# Patient Record
Sex: MALE | Race: WHITE | NOT HISPANIC OR LATINO | Employment: OTHER | URBAN - METROPOLITAN AREA
[De-identification: names, ages, dates, MRNs, and addresses within clinical notes are randomized per-mention and may not be internally consistent; named-entity substitution may affect disease eponyms.]

---

## 2021-05-17 ENCOUNTER — IMMUNIZATION (OUTPATIENT)
Dept: VACCINE CLINIC | Facility: HOSPITAL | Age: 71
End: 2021-05-17

## 2021-05-17 PROCEDURE — 91300 HC SARSCOV02 VAC 30MCG/0.3ML IM: CPT | Performed by: INTERNAL MEDICINE

## 2021-05-17 PROCEDURE — 0001A: CPT | Performed by: INTERNAL MEDICINE

## 2021-06-07 ENCOUNTER — APPOINTMENT (OUTPATIENT)
Dept: VACCINE CLINIC | Facility: HOSPITAL | Age: 71
End: 2021-06-07

## 2021-06-09 ENCOUNTER — APPOINTMENT (OUTPATIENT)
Dept: VACCINE CLINIC | Facility: HOSPITAL | Age: 71
End: 2021-06-09

## 2021-12-10 ENCOUNTER — TRANSCRIBE ORDERS (OUTPATIENT)
Dept: ADMINISTRATIVE | Facility: HOSPITAL | Age: 71
End: 2021-12-10

## 2021-12-10 ENCOUNTER — HOSPITAL ENCOUNTER (OUTPATIENT)
Dept: GENERAL RADIOLOGY | Facility: HOSPITAL | Age: 71
Discharge: HOME | End: 2021-12-10

## 2021-12-10 ENCOUNTER — HOSPITAL ENCOUNTER (OUTPATIENT)
Dept: CARDIOLOGY | Facility: HOSPITAL | Age: 71
Discharge: HOME | End: 2021-12-10

## 2021-12-10 DIAGNOSIS — S89.92XA INJURY OF LEFT KNEE, INITIAL ENCOUNTER: ICD-10-CM

## 2021-12-10 DIAGNOSIS — R60.9 EDEMA, UNSPECIFIED TYPE: Primary | ICD-10-CM

## 2021-12-10 DIAGNOSIS — R60.9 EDEMA, UNSPECIFIED TYPE: ICD-10-CM

## 2021-12-10 DIAGNOSIS — I10 ESSENTIAL HYPERTENSION, MALIGNANT: Primary | ICD-10-CM

## 2021-12-10 LAB
BH CV LOWER VASCULAR LEFT COMMON FEMORAL AUGMENT: NORMAL
BH CV LOWER VASCULAR LEFT COMMON FEMORAL COMPETENT: NORMAL
BH CV LOWER VASCULAR LEFT COMMON FEMORAL COMPRESS: NORMAL
BH CV LOWER VASCULAR LEFT COMMON FEMORAL PHASIC: NORMAL
BH CV LOWER VASCULAR LEFT COMMON FEMORAL SPONT: NORMAL
BH CV LOWER VASCULAR LEFT DISTAL FEMORAL COMPRESS: NORMAL
BH CV LOWER VASCULAR LEFT GASTRONEMIUS COMPRESS: NORMAL
BH CV LOWER VASCULAR LEFT GREATER SAPH AK COMPRESS: NORMAL
BH CV LOWER VASCULAR LEFT GREATER SAPH BK COMPRESS: NORMAL
BH CV LOWER VASCULAR LEFT LESSER SAPH COMPRESS: NORMAL
BH CV LOWER VASCULAR LEFT MID FEMORAL AUGMENT: NORMAL
BH CV LOWER VASCULAR LEFT MID FEMORAL COMPETENT: NORMAL
BH CV LOWER VASCULAR LEFT MID FEMORAL COMPRESS: NORMAL
BH CV LOWER VASCULAR LEFT MID FEMORAL PHASIC: NORMAL
BH CV LOWER VASCULAR LEFT MID FEMORAL SPONT: NORMAL
BH CV LOWER VASCULAR LEFT PERONEAL COMPRESS: NORMAL
BH CV LOWER VASCULAR LEFT POPLITEAL AUGMENT: NORMAL
BH CV LOWER VASCULAR LEFT POPLITEAL COMPETENT: NORMAL
BH CV LOWER VASCULAR LEFT POPLITEAL COMPRESS: NORMAL
BH CV LOWER VASCULAR LEFT POPLITEAL PHASIC: NORMAL
BH CV LOWER VASCULAR LEFT POPLITEAL SPONT: NORMAL
BH CV LOWER VASCULAR LEFT POSTERIOR TIBIAL COMPRESS: NORMAL
BH CV LOWER VASCULAR LEFT PROFUNDA FEMORAL COMPRESS: NORMAL
BH CV LOWER VASCULAR LEFT PROXIMAL FEMORAL COMPRESS: NORMAL
BH CV LOWER VASCULAR LEFT SAPHENOFEMORAL JUNCTION COMPRESS: NORMAL
BH CV LOWER VASCULAR RIGHT COMMON FEMORAL AUGMENT: NORMAL
BH CV LOWER VASCULAR RIGHT COMMON FEMORAL COMPETENT: NORMAL
BH CV LOWER VASCULAR RIGHT COMMON FEMORAL COMPRESS: NORMAL
BH CV LOWER VASCULAR RIGHT COMMON FEMORAL PHASIC: NORMAL
BH CV LOWER VASCULAR RIGHT COMMON FEMORAL SPONT: NORMAL

## 2021-12-10 PROCEDURE — 73562 X-RAY EXAM OF KNEE 3: CPT

## 2021-12-10 PROCEDURE — 93971 EXTREMITY STUDY: CPT

## 2022-01-10 ENCOUNTER — TRANSCRIBE ORDERS (OUTPATIENT)
Dept: ADMINISTRATIVE | Facility: HOSPITAL | Age: 72
End: 2022-01-10

## 2022-01-10 DIAGNOSIS — I10 ESSENTIAL (PRIMARY) HYPERTENSION: Primary | ICD-10-CM

## 2022-01-11 ENCOUNTER — APPOINTMENT (OUTPATIENT)
Dept: ULTRASOUND IMAGING | Facility: HOSPITAL | Age: 72
End: 2022-01-11

## 2022-01-31 ENCOUNTER — HOSPITAL ENCOUNTER (OUTPATIENT)
Dept: CARDIOLOGY | Facility: HOSPITAL | Age: 72
Discharge: HOME | End: 2022-01-31
Admitting: INTERNAL MEDICINE

## 2022-01-31 DIAGNOSIS — I10 ESSENTIAL (PRIMARY) HYPERTENSION: ICD-10-CM

## 2022-01-31 LAB
BH CV ECHO MEAS - DIST REN A EDV LEFT: 40.8 CM/S
BH CV ECHO MEAS - DIST REN A PSV LEFT: 141 CM/S
BH CV ECHO MEAS - MID REN A EDV LEFT: 38.9 CM/S
BH CV ECHO MEAS - MID REN A PSV LEFT: 171 CM/S
BH CV ECHO MEAS - PROX REN A EDV LEFT: 35.3 CM/S
BH CV ECHO MEAS - PROX REN A PSV LEFT: 120 CM/S
BH CV VAS BP LEFT ARM: NORMAL MMHG
BH CV VAS BP RIGHT ARM: NORMAL MMHG
BH CV VAS RENAL AORTIC MID PSV: 91.5 CM/S
BH CV VAS RENAL HILUM LEFT EDV: 12.3 CM/S
BH CV VAS RENAL HILUM LEFT PSV: 42.4 CM/S
BH CV VAS RENAL HILUM RIGHT EDV: 20.4 CM/S
BH CV VAS RENAL HILUM RIGHT PSV: 61.4 CM/S
BH CV XLRA MEAS - KID L LEFT: 10.3 CM
BH CV XLRA MEAS DIST REN A EDV RIGHT: 47.9 CM/S
BH CV XLRA MEAS DIST REN A PSV RIGHT: 170 CM/S
BH CV XLRA MEAS KID L RIGHT: 10.8 CM
BH CV XLRA MEAS KID W RIGHT: 4.49 CM
BH CV XLRA MEAS MID REN A EDV RIGHT: 47.5 CM/S
BH CV XLRA MEAS MID REN A PSV RIGHT: 171 CM/S
BH CV XLRA MEAS PROX REN A EDV RIGHT: 49.1 CM/S
BH CV XLRA MEAS PROX REN A PSV RIGHT: 190 CM/S
BH CV XLRA MEAS RENAL A ORG EDV LEFT: 34.3 CM/S
BH CV XLRA MEAS RENAL A ORG EDV RIGHT: 29.2 CM/S
BH CV XLRA MEAS RENAL A ORG PSV LEFT: 154 CM/S
BH CV XLRA MEAS RENAL A ORG PSV RIGHT: 118 CM/S
LEFT ACCESSORY RENAL DIST DIAS: 28.2 CM/S
LEFT ACCESSORY RENAL DIST SYS: 108 CM/S
LEFT ACCESSORY RENAL MID DIAS: 28.2 CM/S
LEFT ACCESSORY RENAL MID SYS: 99.4 CM/S
LEFT ACCESSORY RENAL ORIGIN DIAS: 33 CM/S
LEFT ACCESSORY RENAL ORIGIN SYS: 132 CM/S
LEFT ACCESSORY RENAL PROX DIAS: 28.2 CM/S
LEFT ACCESSORY RENAL PROX SYS: 114 CM/S
LEFT KIDNEY WIDTH: 5.27 CM
LEFT RENAL UPPER PARENCHYMA MAX: 31.4 CM/S
LEFT RENAL UPPER PARENCHYMA MIN: 7.99 CM/S
LEFT RENAL UPPER PARENCHYMA RI: 0.75
MAXIMAL PREDICTED HEART RATE: 150 BPM
RIGHT ACCESSORY RENAL DIST DIAS: 35.6 CM/S
RIGHT ACCESSORY RENAL DIST SYS: 139 CM/S
RIGHT ACCESSORY RENAL MID DIAS: 34.1 CM/S
RIGHT ACCESSORY RENAL MID SYS: 135 CM/S
RIGHT ACCESSORY RENAL ORIGIN DIAS: 40 CM/S
RIGHT ACCESSORY RENAL ORIGIN SYS: 129 CM/S
RIGHT ACCESSORY RENAL PROX DIAS: 31.7 CM/S
RIGHT ACCESSORY RENAL PROX SYS: 168 CM/S
RIGHT RENAL UPPER PARENCHYMA MAX: 48.7 CM/S
RIGHT RENAL UPPER PARENCHYMA MIN: 12.2 CM/S
RIGHT RENAL UPPER PARENCHYMA RI: 0.75
STRESS TARGET HR: 128 BPM

## 2022-01-31 PROCEDURE — 93975 VASCULAR STUDY: CPT

## 2022-02-02 ENCOUNTER — TRANSCRIBE ORDERS (OUTPATIENT)
Dept: DIABETES SERVICES | Facility: HOSPITAL | Age: 72
End: 2022-02-02

## 2022-02-02 DIAGNOSIS — E11.9 NEWLY DIAGNOSED DIABETES: Primary | ICD-10-CM

## 2022-03-01 ENCOUNTER — APPOINTMENT (OUTPATIENT)
Dept: DIABETES SERVICES | Facility: HOSPITAL | Age: 72
End: 2022-03-01

## 2022-03-15 ENCOUNTER — TRANSCRIBE ORDERS (OUTPATIENT)
Dept: ADMINISTRATIVE | Facility: HOSPITAL | Age: 72
End: 2022-03-15

## 2022-03-15 DIAGNOSIS — R60.9 EDEMA, UNSPECIFIED TYPE: Primary | ICD-10-CM

## 2022-03-15 DIAGNOSIS — I10 HYPERTENSION, UNSPECIFIED TYPE: ICD-10-CM

## 2022-03-30 ENCOUNTER — TRANSCRIBE ORDERS (OUTPATIENT)
Dept: ADMINISTRATIVE | Facility: HOSPITAL | Age: 72
End: 2022-03-30

## 2022-03-30 DIAGNOSIS — I70.1 RENAL ARTERY STENOSIS: Primary | ICD-10-CM

## 2022-04-06 ENCOUNTER — TRANSCRIBE ORDERS (OUTPATIENT)
Dept: ADMINISTRATIVE | Facility: HOSPITAL | Age: 72
End: 2022-04-06

## 2022-04-06 DIAGNOSIS — I10 ESSENTIAL HYPERTENSION, MALIGNANT: Primary | ICD-10-CM

## 2022-04-14 ENCOUNTER — APPOINTMENT (OUTPATIENT)
Dept: DIABETES SERVICES | Facility: HOSPITAL | Age: 72
End: 2022-04-14

## 2022-04-21 ENCOUNTER — APPOINTMENT (OUTPATIENT)
Dept: DIABETES SERVICES | Facility: HOSPITAL | Age: 72
End: 2022-04-21

## 2022-04-23 ENCOUNTER — HOSPITAL ENCOUNTER (OUTPATIENT)
Dept: DIABETES SERVICES | Facility: HOSPITAL | Age: 72
Discharge: HOME | End: 2022-04-23
Admitting: INTERNAL MEDICINE

## 2022-04-23 PROCEDURE — G0109 DIAB MANAGE TRN IND/GROUP: HCPCS

## 2022-04-23 NOTE — CONSULTS
Mr. Ken was seen today by RN Diabetes Educator and Registered Dietitian for the quarterly Diabetes Education Class. Comprehensive American Diabetes Association assessment will be sent to medical records to attach to this encounter.      has been encouraged to call our office with questions or additional education needs. Please place referral for additional services or followup should need arise. Thank you for the referral.

## 2022-04-26 ENCOUNTER — HOSPITAL ENCOUNTER (OUTPATIENT)
Dept: CARDIOLOGY | Facility: HOSPITAL | Age: 72
Discharge: HOME | End: 2022-04-26

## 2022-04-26 ENCOUNTER — HOSPITAL ENCOUNTER (OUTPATIENT)
Dept: CT IMAGING | Facility: HOSPITAL | Age: 72
Discharge: HOME | End: 2022-04-26

## 2022-04-26 VITALS
SYSTOLIC BLOOD PRESSURE: 148 MMHG | DIASTOLIC BLOOD PRESSURE: 90 MMHG | HEIGHT: 67 IN | HEART RATE: 79 BPM | BODY MASS INDEX: 32.18 KG/M2 | WEIGHT: 205 LBS

## 2022-04-26 DIAGNOSIS — I10 HYPERTENSION, UNSPECIFIED TYPE: ICD-10-CM

## 2022-04-26 DIAGNOSIS — I70.1 RENAL ARTERY STENOSIS: ICD-10-CM

## 2022-04-26 DIAGNOSIS — R60.9 EDEMA, UNSPECIFIED TYPE: ICD-10-CM

## 2022-04-26 LAB
AORTIC DIMENSIONLESS INDEX: 0.8 (DI)
BH CV ECHO MEAS - AO MAX PG: 8 MMHG
BH CV ECHO MEAS - AO MEAN PG: 4.1 MMHG
BH CV ECHO MEAS - AO V2 MAX: 141.2 CM/SEC
BH CV ECHO MEAS - AO V2 VTI: 28.7 CM
BH CV ECHO MEAS - AVA(I,D): 2.9 CM2
BH CV ECHO MEAS - EDV(CUBED): 85.3 ML
BH CV ECHO MEAS - EDV(MOD-SP2): 96 ML
BH CV ECHO MEAS - EDV(MOD-SP4): 118 ML
BH CV ECHO MEAS - EF(MOD-BP): 60.4 %
BH CV ECHO MEAS - EF(MOD-SP2): 57.3 %
BH CV ECHO MEAS - EF(MOD-SP4): 63.6 %
BH CV ECHO MEAS - ESV(CUBED): 17.5 ML
BH CV ECHO MEAS - ESV(MOD-SP2): 41 ML
BH CV ECHO MEAS - ESV(MOD-SP4): 43 ML
BH CV ECHO MEAS - FS: 41 %
BH CV ECHO MEAS - IVS/LVPW: 0.94 CM
BH CV ECHO MEAS - IVSD: 1.07 CM
BH CV ECHO MEAS - LAT PEAK E' VEL: 7.2 CM/SEC
BH CV ECHO MEAS - LV MASS(C)D: 171.5 GRAMS
BH CV ECHO MEAS - LV MAX PG: 4.8 MMHG
BH CV ECHO MEAS - LV MEAN PG: 2.31 MMHG
BH CV ECHO MEAS - LV V1 MAX: 109.8 CM/SEC
BH CV ECHO MEAS - LV V1 VTI: 23.7 CM
BH CV ECHO MEAS - LVIDD: 4.4 CM
BH CV ECHO MEAS - LVIDS: 2.6 CM
BH CV ECHO MEAS - LVOT AREA: 3.5 CM2
BH CV ECHO MEAS - LVOT DIAM: 2.11 CM
BH CV ECHO MEAS - LVPWD: 1.15 CM
BH CV ECHO MEAS - MED PEAK E' VEL: 6.9 CM/SEC
BH CV ECHO MEAS - MV A DUR: 0.12 SEC
BH CV ECHO MEAS - MV A MAX VEL: 111.9 CM/SEC
BH CV ECHO MEAS - MV DEC SLOPE: 325.6 CM/SEC2
BH CV ECHO MEAS - MV DEC TIME: 0.17 MSEC
BH CV ECHO MEAS - MV E MAX VEL: 80.9 CM/SEC
BH CV ECHO MEAS - MV E/A: 0.72
BH CV ECHO MEAS - MV MAX PG: 5.3 MMHG
BH CV ECHO MEAS - MV MEAN PG: 1.86 MMHG
BH CV ECHO MEAS - MV V2 VTI: 30.9 CM
BH CV ECHO MEAS - MVA(VTI): 2.7 CM2
BH CV ECHO MEAS - PA ACC TIME: 0.1 SEC
BH CV ECHO MEAS - PA PR(ACCEL): 35 MMHG
BH CV ECHO MEAS - PA V2 MAX: 84.2 CM/SEC
BH CV ECHO MEAS - PI END-D VEL: 97.7 CM/SEC
BH CV ECHO MEAS - PULM A REVS DUR: 0.1 SEC
BH CV ECHO MEAS - PULM A REVS VEL: 31.3 CM/SEC
BH CV ECHO MEAS - PULM DIAS VEL: 55.4 CM/SEC
BH CV ECHO MEAS - PULM SYS VEL: 55.4 CM/SEC
BH CV ECHO MEAS - RAP SYSTOLE: 8 MMHG
BH CV ECHO MEAS - RV MAX PG: 2.11 MMHG
BH CV ECHO MEAS - RV V1 MAX: 72.6 CM/SEC
BH CV ECHO MEAS - RV V1 VTI: 15.9 CM
BH CV ECHO MEAS - RVOT DIAM: 2.17 CM
BH CV ECHO MEAS - SV(LVOT): 82.4 ML
BH CV ECHO MEAS - SV(MOD-SP2): 55 ML
BH CV ECHO MEAS - SV(MOD-SP4): 75 ML
BH CV ECHO MEAS - SV(RVOT): 59.1 ML
BH CV ECHO MEAS - TAPSE (>1.6): 2.35 CM
BH CV ECHO MEASUREMENTS AVERAGE E/E' RATIO: 11.48
BH CV VAS BP RIGHT ARM: NORMAL MMHG
BH CV XLRA - RV BASE: 3.7 CM
BH CV XLRA - RV LENGTH: 8.5 CM
BH CV XLRA - RV MID: 3.3 CM
BH CV XLRA - TDI S': 17.3 CM/SEC
CREAT BLDA-MCNC: 1 MG/DL (ref 0.6–1.3)
LEFT ATRIUM VOLUME INDEX: 24.4 ML/M2
MAXIMAL PREDICTED HEART RATE: 150 BPM
SINUS: 2.8 CM
STRESS TARGET HR: 128 BPM

## 2022-04-26 PROCEDURE — 0 IOPAMIDOL PER 1 ML: Performed by: SURGERY

## 2022-04-26 PROCEDURE — 74174 CTA ABD&PLVS W/CONTRAST: CPT

## 2022-04-26 PROCEDURE — 82565 ASSAY OF CREATININE: CPT

## 2022-04-26 PROCEDURE — 93306 TTE W/DOPPLER COMPLETE: CPT | Performed by: INTERNAL MEDICINE

## 2022-04-26 PROCEDURE — 93306 TTE W/DOPPLER COMPLETE: CPT

## 2022-04-26 RX ADMIN — IOPAMIDOL 95 ML: 755 INJECTION, SOLUTION INTRAVENOUS at 10:57

## 2022-05-16 ENCOUNTER — APPOINTMENT (OUTPATIENT)
Dept: CARDIOLOGY | Facility: HOSPITAL | Age: 72
End: 2022-05-16

## 2022-12-05 ENCOUNTER — HOSPITAL ENCOUNTER (OUTPATIENT)
Facility: HOSPITAL | Age: 72
Setting detail: OBSERVATION
LOS: 1 days | Discharge: HOME | End: 2022-12-07
Attending: EMERGENCY MEDICINE | Admitting: INTERNAL MEDICINE

## 2022-12-05 ENCOUNTER — APPOINTMENT (OUTPATIENT)
Dept: GENERAL RADIOLOGY | Facility: HOSPITAL | Age: 72
End: 2022-12-05

## 2022-12-05 DIAGNOSIS — R06.09 EXERTIONAL DYSPNEA: ICD-10-CM

## 2022-12-05 DIAGNOSIS — R60.0 BILATERAL LOWER EXTREMITY EDEMA: Primary | ICD-10-CM

## 2022-12-05 DIAGNOSIS — R60.0 EDEMA OF UPPER EXTREMITY: ICD-10-CM

## 2022-12-05 LAB
ALBUMIN SERPL-MCNC: 3.7 G/DL (ref 3.5–5.2)
ALBUMIN/GLOB SERPL: 1.2 G/DL
ALP SERPL-CCNC: 80 U/L (ref 39–117)
ALT SERPL W P-5'-P-CCNC: 18 U/L (ref 1–41)
ANION GAP SERPL CALCULATED.3IONS-SCNC: 15 MMOL/L (ref 5–15)
AST SERPL-CCNC: 14 U/L (ref 1–40)
BASOPHILS # BLD AUTO: 0.03 10*3/MM3 (ref 0–0.2)
BASOPHILS NFR BLD AUTO: 0.3 % (ref 0–1.5)
BILIRUB SERPL-MCNC: 0.4 MG/DL (ref 0–1.2)
BUN SERPL-MCNC: 17 MG/DL (ref 8–23)
BUN/CREAT SERPL: 19.8 (ref 7–25)
CALCIUM SPEC-SCNC: 9.2 MG/DL (ref 8.6–10.5)
CHLORIDE SERPL-SCNC: 95 MMOL/L (ref 98–107)
CO2 SERPL-SCNC: 26 MMOL/L (ref 22–29)
CREAT SERPL-MCNC: 0.86 MG/DL (ref 0.76–1.27)
DEPRECATED RDW RBC AUTO: 42.9 FL (ref 37–54)
EGFRCR SERPLBLD CKD-EPI 2021: 92.6 ML/MIN/1.73
EOSINOPHIL # BLD AUTO: 0.12 10*3/MM3 (ref 0–0.4)
EOSINOPHIL NFR BLD AUTO: 1.1 % (ref 0.3–6.2)
ERYTHROCYTE [DISTWIDTH] IN BLOOD BY AUTOMATED COUNT: 13.7 % (ref 12.3–15.4)
GLOBULIN UR ELPH-MCNC: 3 GM/DL
GLUCOSE SERPL-MCNC: 141 MG/DL (ref 65–99)
HCT VFR BLD AUTO: 34.7 % (ref 37.5–51)
HGB BLD-MCNC: 11.4 G/DL (ref 13–17.7)
IMM GRANULOCYTES # BLD AUTO: 0.07 10*3/MM3 (ref 0–0.05)
IMM GRANULOCYTES NFR BLD AUTO: 0.6 % (ref 0–0.5)
LIPASE SERPL-CCNC: 16 U/L (ref 13–60)
LYMPHOCYTES # BLD AUTO: 1.44 10*3/MM3 (ref 0.7–3.1)
LYMPHOCYTES NFR BLD AUTO: 12.7 % (ref 19.6–45.3)
MCH RBC QN AUTO: 28.8 PG (ref 26.6–33)
MCHC RBC AUTO-ENTMCNC: 32.9 G/DL (ref 31.5–35.7)
MCV RBC AUTO: 87.6 FL (ref 79–97)
MONOCYTES # BLD AUTO: 0.85 10*3/MM3 (ref 0.1–0.9)
MONOCYTES NFR BLD AUTO: 7.5 % (ref 5–12)
NEUTROPHILS NFR BLD AUTO: 77.8 % (ref 42.7–76)
NEUTROPHILS NFR BLD AUTO: 8.8 10*3/MM3 (ref 1.7–7)
NRBC BLD AUTO-RTO: 0.1 /100 WBC (ref 0–0.2)
NT-PROBNP SERPL-MCNC: 255 PG/ML (ref 0–900)
PLATELET # BLD AUTO: 265 10*3/MM3 (ref 140–450)
PMV BLD AUTO: 9.3 FL (ref 6–12)
POTASSIUM SERPL-SCNC: 3.4 MMOL/L (ref 3.5–5.2)
PROT SERPL-MCNC: 6.7 G/DL (ref 6–8.5)
RBC # BLD AUTO: 3.96 10*6/MM3 (ref 4.14–5.8)
SODIUM SERPL-SCNC: 136 MMOL/L (ref 136–145)
TROPONIN T SERPL-MCNC: <0.01 NG/ML (ref 0–0.03)
WBC NRBC COR # BLD: 11.31 10*3/MM3 (ref 3.4–10.8)

## 2022-12-05 PROCEDURE — 93010 ELECTROCARDIOGRAM REPORT: CPT | Performed by: INTERNAL MEDICINE

## 2022-12-05 PROCEDURE — 93005 ELECTROCARDIOGRAM TRACING: CPT | Performed by: EMERGENCY MEDICINE

## 2022-12-05 PROCEDURE — 85025 COMPLETE CBC W/AUTO DIFF WBC: CPT | Performed by: EMERGENCY MEDICINE

## 2022-12-05 PROCEDURE — 83735 ASSAY OF MAGNESIUM: CPT | Performed by: INTERNAL MEDICINE

## 2022-12-05 PROCEDURE — 84100 ASSAY OF PHOSPHORUS: CPT | Performed by: INTERNAL MEDICINE

## 2022-12-05 PROCEDURE — 83690 ASSAY OF LIPASE: CPT | Performed by: EMERGENCY MEDICINE

## 2022-12-05 PROCEDURE — 71045 X-RAY EXAM CHEST 1 VIEW: CPT

## 2022-12-05 PROCEDURE — 80061 LIPID PANEL: CPT | Performed by: INTERNAL MEDICINE

## 2022-12-05 PROCEDURE — 80053 COMPREHEN METABOLIC PANEL: CPT | Performed by: EMERGENCY MEDICINE

## 2022-12-05 PROCEDURE — 36415 COLL VENOUS BLD VENIPUNCTURE: CPT

## 2022-12-05 PROCEDURE — 99284 EMERGENCY DEPT VISIT MOD MDM: CPT

## 2022-12-05 PROCEDURE — 83880 ASSAY OF NATRIURETIC PEPTIDE: CPT | Performed by: EMERGENCY MEDICINE

## 2022-12-05 PROCEDURE — 84484 ASSAY OF TROPONIN QUANT: CPT | Performed by: EMERGENCY MEDICINE

## 2022-12-05 PROCEDURE — 84443 ASSAY THYROID STIM HORMONE: CPT | Performed by: INTERNAL MEDICINE

## 2022-12-05 RX ORDER — SODIUM CHLORIDE 0.9 % (FLUSH) 0.9 %
10 SYRINGE (ML) INJECTION AS NEEDED
Status: DISCONTINUED | OUTPATIENT
Start: 2022-12-05 | End: 2022-12-07 | Stop reason: HOSPADM

## 2022-12-05 NOTE — ED TRIAGE NOTES
Pt presents to ED with complaints of increased generalized swelling. Pt was told by PCP to be evaluated in the ER for possible kidney dysfunction. Pt complaints of all over joint pain.

## 2022-12-05 NOTE — H&P
Date of admission: December 5, 2022    Chief concern: My joints hurt.  I am swelling all over.    History of present illness:  This is a very nice 72-year-old gentleman.  He has a history of hypertension, hyperlipidemia, type 2 diabetes mellitus, chronic venous stasis, osteoarthritis and gout.  Patient states about 2 months ago he started having more joint pain.  He noted general joint aching.  At first the joints involved were the fingers, knuckles, wrists, elbows, shoulders, hips and right knee.  He had swelling and warmth of several of these joints.  Subsequently he developed early morning stiffness which lasted for about 2 hours.  If he would sit for more than before short period time he would stiffen up.  He had trouble playing golf.  He had trouble just getting around.  Over the past couple weeks the joint symptoms have continued.  Now he has more significant proximal pain.  The pain is primarily in his shoulders, hips and right knee.  The hands and wrists are not as tender.  Along with the joint pain the patient has developed edema.  At first the swelling was minimal.  Over the past week it has become more marked.  He has gained 20 pounds over the past 7 days.  He has trouble even moving his fingers due to the swelling.  He has developed numbness and tingling of the thumb index and long fingers.  He has trouble getting his socks on.  Been using meloxicam but it does not help    The patient denies any chest pain.  He has no shortness of breath.  He has no PND orthopnea.  He urinates frequently.  His urine has been of normal color and he has no dysuria.  He has nocturia about 2 or 3 times at night.  He admits to minimal alcohol use.  He has no history of renal or hepatic pathology.  He has had no recent infections.    Evaluation done over the past several weeks include unremarkable CBC and CMP.  His sed rate and CRP have been elevated.  His NATASHA came back 1:160.  It was in a nucleolar pattern CCP and rheumatoid  factor been negative.    Past medical history:    Allergies: None    Medications:  Allopurinol 300 mg a day  Amlodipine 10 mg a day  Atorvastatin 10 mg a day  Duloxetine 60 mg daily  Hyoscyamine 0.125 as needed  Meloxicam 15 mg a day  Metformin 1000 mg twice daily  Metoprolol 50 mg daily  Olmesartan hydrochlorothiazide 40/25 daily  Omeprazole 20 Wednesday  Ropinirole 2 mg 3 times daily as needed  Tamsulosin 0.4 daily    Medical history:  Type 2 diabetes mellitus diagnosed earlier this year, reasonably well controlled on current regimen.  Essential hypertension  Hyperlipidemia  Gout  Restless leg syndrome  Peripheral neuropathy (idiopathic)  BPH with outflow obstruction  Osteoarthritis  Distant history of colon polyps    Surgical history:  Hand surgery following trauma, left hand   Right knee arthroscopic surgery   Left knee arthroscopic surgery   Right total knee replacement   Left and right cataract surgery   Arthroscopic surgery left knee   Laser procedure for BPH     Family history:  Father  age 86.  Heart disease and hypertension.  Mother  age 83.  He had hypertension and  following trauma.  The patient had 7 siblings.  Ages range from 64-81.  1 brother  of a brain aneurysm at age 54.  He has a sister who has 81 and has colon cancer.  He has a 78-year-old sister was had a few strokes.  His brother has atherosclerotic cardiovascular disease.  1 brother had a deep venous thrombosis.  Multiple siblings have hypertension.  Patient and son.  He is 40.  He has type 1 diabetes mellitus, hypertension hyperlipidemia.  The patient has 3 grandchildren.    Social history:  The patient has never smoked.  He occasionally has an alcoholic drink.  He is a .  He lives alone.  He enjoys golfing.  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  No night sweats.  Energy down.  Weight up 20 pounds in the past week.  Patient  sleeping well.  Blood sugars have been in the 120s to 140s fasting.  Neurologic: No headaches.  No significant visual or hearing problems.  No concussion or loss of consciousness.  Numbness in the hands and feet as mentioned above.  GI: No nausea or vomiting.  Patient having 1 or 2 formed bowel movements a day.  No blood in the stool or black stool.  Cardiorespiratory: Patient had a evaluation for chest pain 3 weeks ago at another hospital.  This included a normal pharmacologic stress test.  Patient denies shortness of breath, palpitations, PND orthopnea.  Patient has significant edema as mentioned above.  : Patient relates several times a day and up to 3 times at night.  His stream is weak.  No dysuria or change in color.  Musculoskeletal: See HPI  Dermatologic: Patient complains of dry mouth and dry eyes.  No oral ulcers.  No alopecia.  No rashes or photosensitivity except for a little erythema on his right anterior shin.  Psychiatric: Unremarked    Physical examination:  Vital signs: Blood pressure 153/80, pulse 95, respirate 12, temperature 97.8, oxygen saturation 95% on room air at rest, height 5 7, weight 232, BMI 36  Appearance: Overweight 72-year-old gentleman.  He is in no distress but he does move very slowly and seems to be in discomfort when he does move.  HEENT: Normocephalic atraumatic.  Male pattern hair loss.  Pupils round and equal.  Pharynx clear.  Mucous membranes are little dry.  Dentition in good condition.  Neck: Thick.  No lymphadenopathy.  I could not appreciate JVD or HJR.  Range of motion diminished.  No thyromegaly.  No masses.  Chest: Clear to auscultation percussion.  No wheezes rales or rhonchi  Heart: Regular rate and rhythm.  No murmur gallop or rub.  Abdomen: Protuberant with large fat pad diastases recti and small umbilical hernia.  Liver palpable on inspiration.  Spleen not palpable.  No rebound, tenderness or guarding.  Back: Patient had 1+ presacral edema.  No CVA or spine  tenderness.  Extremities: 1+ edema of the wrist hands and forearms.  2+ pretibial and pedal edema.  Patient has some chronic venous stasis changes mainly of the right greater than left lower extremity.  This was in the area of the shins.  Dermatologic: Generalized xerosis.  Actinic changes on the forehead.  Solar lentigines on the forearms.  Joint exam: DIPs were unremarkable and PIPs were mildly tender.  The patient had a bogginess and swelling of the MCPs but they were not tender.  He had some bogginess in the wrist but no tenderness.  Elbows were unremarkable.  Shoulders had decreased range of motion pain with motion.  Hips had some minor pain with motion as did the right and the (prosthetic anterior scar present) feet and ankles had no obvious synovitis with were significantly swollen.  Neurologic: Alert and oriented x3.  Nonfocal.  Strength 4/5 bilaterally.  Positive Phalen's and Tinel's in the hands.    Laboratory data:  Laboratory data from November 17, 2022 CMP was unremarkable, magnesium 1.7, hemoglobin A1c 6.8, white blood cell count 9.4, hemoglobin 12.5 hematocrit 37.0, platelet count 294 triglyceride 88, HDL 33    Laboratory data from last week:  ESR 54 under 1:60 nucleolar pattern 1:40 cytoplasmic pattern  CCP was negative  Rheumatoid factor negative  Uric acid 5.5    EKG today: Normal sinus rhythm.  Prolonged QT.  No ST or T wave changes.  No significant Q waves or ectopy.  Urinalysis today was unremarkable.  No protein or significant sediment in the urine.    Impression:  1.  Anasarca.  The patient has no strong indication for heart failure but this is possible.  He had no protein in the screening urine done in the office.  He has no strong signal for liver disease.  2.  Polyarticular joint pain consider systemic lupus erythematosus consider RSSSPE  3.  Essential hypertension  4.  Type 2 diabetes mellitus  5.  BPH with bladder outlet obstruction  6.  Hyperlipidemia  7.  Bilateral carpal tunnel related  to the significant anasarca.    Plan:  I believe this patient needs admission to the hospital.  He was directed to the emergency room.  The emergency room will call me after he is seen.  Hopefully he will be able to admit the patient.  I believe we will need to check CMP, CBC, proBNP, troponin, chest x-ray, ultrasound of the kidneys, EKG echocardiogram, CPK and repeat urinalysis.  I also think we should check SSA, SSB, negative DNA and Brambila antigen.   x-rays of the hands may be helpful  The patient will be treated with intravenous furosemide once he is admitted

## 2022-12-06 ENCOUNTER — APPOINTMENT (OUTPATIENT)
Dept: CARDIOLOGY | Facility: HOSPITAL | Age: 72
End: 2022-12-06

## 2022-12-06 ENCOUNTER — APPOINTMENT (OUTPATIENT)
Dept: ULTRASOUND IMAGING | Facility: HOSPITAL | Age: 72
End: 2022-12-06

## 2022-12-06 LAB
ANION GAP SERPL CALCULATED.3IONS-SCNC: 11 MMOL/L (ref 5–15)
AORTIC DIMENSIONLESS INDEX: 0.7 (DI)
ASCENDING AORTA: 2.9 CM
BH CV ECHO MEAS - ACS: 1.7 CM
BH CV ECHO MEAS - AO MAX PG: 9.7 MMHG
BH CV ECHO MEAS - AO MEAN PG: 5.2 MMHG
BH CV ECHO MEAS - AO V2 MAX: 156.1 CM/SEC
BH CV ECHO MEAS - AO V2 VTI: 32.8 CM
BH CV ECHO MEAS - AVA(I,D): 2.22 CM2
BH CV ECHO MEAS - EDV(CUBED): 120.1 ML
BH CV ECHO MEAS - EDV(MOD-SP2): 99 ML
BH CV ECHO MEAS - EDV(MOD-SP4): 99 ML
BH CV ECHO MEAS - EF(MOD-BP): 53 %
BH CV ECHO MEAS - EF(MOD-SP2): 48.5 %
BH CV ECHO MEAS - EF(MOD-SP4): 57.6 %
BH CV ECHO MEAS - ESV(CUBED): 36.1 ML
BH CV ECHO MEAS - ESV(MOD-SP2): 51 ML
BH CV ECHO MEAS - ESV(MOD-SP4): 42 ML
BH CV ECHO MEAS - FS: 33 %
BH CV ECHO MEAS - IVS/LVPW: 1.01 CM
BH CV ECHO MEAS - IVSD: 1.12 CM
BH CV ECHO MEAS - LAT PEAK E' VEL: 4.7 CM/SEC
BH CV ECHO MEAS - LV DIASTOLIC VOL/BSA (35-75): 46.2 CM2
BH CV ECHO MEAS - LV MASS(C)D: 205.6 GRAMS
BH CV ECHO MEAS - LV MAX PG: 4.5 MMHG
BH CV ECHO MEAS - LV MEAN PG: 2.29 MMHG
BH CV ECHO MEAS - LV SYSTOLIC VOL/BSA (12-30): 19.6 CM2
BH CV ECHO MEAS - LV V1 MAX: 105.9 CM/SEC
BH CV ECHO MEAS - LV V1 VTI: 23.6 CM
BH CV ECHO MEAS - LVIDD: 4.9 CM
BH CV ECHO MEAS - LVIDS: 3.3 CM
BH CV ECHO MEAS - LVOT AREA: 3.1 CM2
BH CV ECHO MEAS - LVOT DIAM: 1.98 CM
BH CV ECHO MEAS - LVPWD: 1.11 CM
BH CV ECHO MEAS - MED PEAK E' VEL: 4.9 CM/SEC
BH CV ECHO MEAS - MV A DUR: 0.13 SEC
BH CV ECHO MEAS - MV A MAX VEL: 135.3 CM/SEC
BH CV ECHO MEAS - MV DEC SLOPE: 533.5 CM/SEC2
BH CV ECHO MEAS - MV DEC TIME: 190 MSEC
BH CV ECHO MEAS - MV E MAX VEL: 88.1 CM/SEC
BH CV ECHO MEAS - MV E/A: 0.65
BH CV ECHO MEAS - MV MAX PG: 8.6 MMHG
BH CV ECHO MEAS - MV MEAN PG: 3.7 MMHG
BH CV ECHO MEAS - MV P1/2T: 60.4 MSEC
BH CV ECHO MEAS - MV V2 VTI: 32 CM
BH CV ECHO MEAS - MVA(P1/2T): 3.6 CM2
BH CV ECHO MEAS - MVA(VTI): 2.28 CM2
BH CV ECHO MEAS - PA ACC TIME: 0.09 SEC
BH CV ECHO MEAS - PA PR(ACCEL): 38.6 MMHG
BH CV ECHO MEAS - PA V2 MAX: 96.4 CM/SEC
BH CV ECHO MEAS - PULM A REVS DUR: 0.13 SEC
BH CV ECHO MEAS - PULM A REVS VEL: 44.3 CM/SEC
BH CV ECHO MEAS - PULM DIAS VEL: 38.1 CM/SEC
BH CV ECHO MEAS - PULM S/D: 1.51
BH CV ECHO MEAS - PULM SYS VEL: 57.6 CM/SEC
BH CV ECHO MEAS - RAP SYSTOLE: 3 MMHG
BH CV ECHO MEAS - RV MAX PG: 1.32 MMHG
BH CV ECHO MEAS - RV V1 MAX: 57.4 CM/SEC
BH CV ECHO MEAS - RV V1 VTI: 12.7 CM
BH CV ECHO MEAS - SI(MOD-SP2): 22.4 ML/M2
BH CV ECHO MEAS - SI(MOD-SP4): 26.6 ML/M2
BH CV ECHO MEAS - SV(LVOT): 72.8 ML
BH CV ECHO MEAS - SV(MOD-SP2): 48 ML
BH CV ECHO MEAS - SV(MOD-SP4): 57 ML
BH CV ECHO MEAS - TAPSE (>1.6): 2.01 CM
BH CV ECHO MEASUREMENTS AVERAGE E/E' RATIO: 18.35
BH CV XLRA - RV BASE: 3.1 CM
BH CV XLRA - RV LENGTH: 7.5 CM
BH CV XLRA - RV MID: 2.33 CM
BH CV XLRA - TDI S': 15.2 CM/SEC
BILIRUB UR QL STRIP: NEGATIVE
BUN SERPL-MCNC: 14 MG/DL (ref 8–23)
BUN/CREAT SERPL: 14.7 (ref 7–25)
C3 SERPL-MCNC: 145 MG/DL (ref 82–167)
C4 SERPL-MCNC: 38 MG/DL (ref 14–44)
CALCIUM SPEC-SCNC: 9.4 MG/DL (ref 8.6–10.5)
CHLORIDE SERPL-SCNC: 101 MMOL/L (ref 98–107)
CHOLEST SERPL-MCNC: 117 MG/DL (ref 0–200)
CK SERPL-CCNC: 78 U/L (ref 20–200)
CLARITY UR: CLEAR
CO2 SERPL-SCNC: 28 MMOL/L (ref 22–29)
COLOR UR: YELLOW
CREAT SERPL-MCNC: 0.95 MG/DL (ref 0.76–1.27)
CRP SERPL-MCNC: 7.6 MG/DL (ref 0–0.5)
EGFRCR SERPLBLD CKD-EPI 2021: 85.6 ML/MIN/1.73
ERYTHROCYTE [SEDIMENTATION RATE] IN BLOOD: 39 MM/HR (ref 0–20)
GLUCOSE BLDC GLUCOMTR-MCNC: 113 MG/DL (ref 70–130)
GLUCOSE BLDC GLUCOMTR-MCNC: 179 MG/DL (ref 70–130)
GLUCOSE BLDC GLUCOMTR-MCNC: 79 MG/DL (ref 70–130)
GLUCOSE SERPL-MCNC: 122 MG/DL (ref 65–99)
GLUCOSE UR STRIP-MCNC: NEGATIVE MG/DL
HDLC SERPL-MCNC: 47 MG/DL (ref 40–60)
HGB UR QL STRIP.AUTO: NEGATIVE
KETONES UR QL STRIP: NEGATIVE
LDLC SERPL CALC-MCNC: 56 MG/DL (ref 0–100)
LDLC/HDLC SERPL: 1.22 {RATIO}
LEFT ATRIUM VOLUME INDEX: 23.2 ML/M2
LEUKOCYTE ESTERASE UR QL STRIP.AUTO: NEGATIVE
MAGNESIUM SERPL-MCNC: 1.6 MG/DL (ref 1.6–2.4)
MAXIMAL PREDICTED HEART RATE: 149 BPM
NITRITE UR QL STRIP: NEGATIVE
PH UR STRIP.AUTO: >=9 [PH] (ref 5–8)
PHOSPHATE SERPL-MCNC: 4.2 MG/DL (ref 2.5–4.5)
POTASSIUM SERPL-SCNC: 3.4 MMOL/L (ref 3.5–5.2)
PROT UR QL STRIP: NEGATIVE
QT INTERVAL: 360 MS
SINUS: 3.3 CM
SODIUM SERPL-SCNC: 140 MMOL/L (ref 136–145)
SP GR UR STRIP: 1.01 (ref 1–1.03)
STRESS TARGET HR: 127 BPM
TRIGL SERPL-MCNC: 63 MG/DL (ref 0–150)
TSH SERPL DL<=0.05 MIU/L-ACNC: 2.46 UIU/ML (ref 0.27–4.2)
UROBILINOGEN UR QL STRIP: ABNORMAL
VLDLC SERPL-MCNC: 14 MG/DL (ref 5–40)

## 2022-12-06 PROCEDURE — 76775 US EXAM ABDO BACK WALL LIM: CPT

## 2022-12-06 PROCEDURE — 96374 THER/PROPH/DIAG INJ IV PUSH: CPT

## 2022-12-06 PROCEDURE — 82550 ASSAY OF CK (CPK): CPT | Performed by: INTERNAL MEDICINE

## 2022-12-06 PROCEDURE — 86235 NUCLEAR ANTIGEN ANTIBODY: CPT | Performed by: INTERNAL MEDICINE

## 2022-12-06 PROCEDURE — G0378 HOSPITAL OBSERVATION PER HR: HCPCS

## 2022-12-06 PROCEDURE — 96372 THER/PROPH/DIAG INJ SC/IM: CPT

## 2022-12-06 PROCEDURE — 86140 C-REACTIVE PROTEIN: CPT | Performed by: INTERNAL MEDICINE

## 2022-12-06 PROCEDURE — 93306 TTE W/DOPPLER COMPLETE: CPT | Performed by: INTERNAL MEDICINE

## 2022-12-06 PROCEDURE — 86038 ANTINUCLEAR ANTIBODIES: CPT | Performed by: INTERNAL MEDICINE

## 2022-12-06 PROCEDURE — 85652 RBC SED RATE AUTOMATED: CPT | Performed by: INTERNAL MEDICINE

## 2022-12-06 PROCEDURE — 82962 GLUCOSE BLOOD TEST: CPT

## 2022-12-06 PROCEDURE — 25010000002 FUROSEMIDE PER 20 MG: Performed by: INTERNAL MEDICINE

## 2022-12-06 PROCEDURE — 86160 COMPLEMENT ANTIGEN: CPT | Performed by: INTERNAL MEDICINE

## 2022-12-06 PROCEDURE — 86225 DNA ANTIBODY NATIVE: CPT | Performed by: INTERNAL MEDICINE

## 2022-12-06 PROCEDURE — 93306 TTE W/DOPPLER COMPLETE: CPT

## 2022-12-06 PROCEDURE — 80048 BASIC METABOLIC PNL TOTAL CA: CPT | Performed by: INTERNAL MEDICINE

## 2022-12-06 PROCEDURE — 81003 URINALYSIS AUTO W/O SCOPE: CPT | Performed by: INTERNAL MEDICINE

## 2022-12-06 PROCEDURE — 25010000002 ENOXAPARIN PER 10 MG: Performed by: INTERNAL MEDICINE

## 2022-12-06 PROCEDURE — 63710000001 INSULIN LISPRO (HUMAN) PER 5 UNITS: Performed by: INTERNAL MEDICINE

## 2022-12-06 RX ORDER — DULOXETIN HYDROCHLORIDE 60 MG/1
60 CAPSULE, DELAYED RELEASE ORAL DAILY
Status: DISCONTINUED | OUTPATIENT
Start: 2022-12-06 | End: 2022-12-07 | Stop reason: HOSPADM

## 2022-12-06 RX ORDER — FUROSEMIDE 10 MG/ML
40 INJECTION INTRAMUSCULAR; INTRAVENOUS ONCE
Status: COMPLETED | OUTPATIENT
Start: 2022-12-07 | End: 2022-12-07

## 2022-12-06 RX ORDER — ALLOPURINOL 300 MG/1
300 TABLET ORAL DAILY
COMMUNITY

## 2022-12-06 RX ORDER — NICOTINE POLACRILEX 4 MG
15 LOZENGE BUCCAL
Status: DISCONTINUED | OUTPATIENT
Start: 2022-12-06 | End: 2022-12-07 | Stop reason: HOSPADM

## 2022-12-06 RX ORDER — DULOXETIN HYDROCHLORIDE 60 MG/1
60 CAPSULE, DELAYED RELEASE ORAL DAILY
COMMUNITY

## 2022-12-06 RX ORDER — SODIUM CHLORIDE 0.9 % (FLUSH) 0.9 %
10 SYRINGE (ML) INJECTION AS NEEDED
Status: DISCONTINUED | OUTPATIENT
Start: 2022-12-06 | End: 2022-12-07 | Stop reason: HOSPADM

## 2022-12-06 RX ORDER — SODIUM CHLORIDE 0.9 % (FLUSH) 0.9 %
10 SYRINGE (ML) INJECTION EVERY 12 HOURS SCHEDULED
Status: DISCONTINUED | OUTPATIENT
Start: 2022-12-06 | End: 2022-12-07 | Stop reason: HOSPADM

## 2022-12-06 RX ORDER — ROPINIROLE 2 MG/1
2 TABLET, FILM COATED ORAL 2 TIMES DAILY
COMMUNITY
End: 2022-12-07 | Stop reason: HOSPADM

## 2022-12-06 RX ORDER — SODIUM CHLORIDE 9 MG/ML
40 INJECTION, SOLUTION INTRAVENOUS AS NEEDED
Status: DISCONTINUED | OUTPATIENT
Start: 2022-12-06 | End: 2022-12-07 | Stop reason: HOSPADM

## 2022-12-06 RX ORDER — FUROSEMIDE 10 MG/ML
40 INJECTION INTRAMUSCULAR; INTRAVENOUS ONCE
Status: DISCONTINUED | OUTPATIENT
Start: 2022-12-06 | End: 2022-12-06

## 2022-12-06 RX ORDER — AMLODIPINE BESYLATE 10 MG/1
10 TABLET ORAL DAILY
COMMUNITY
End: 2022-12-07 | Stop reason: HOSPADM

## 2022-12-06 RX ORDER — ROPINIROLE 2 MG/1
2 TABLET, FILM COATED ORAL EVERY 12 HOURS SCHEDULED
Status: DISCONTINUED | OUTPATIENT
Start: 2022-12-06 | End: 2022-12-07 | Stop reason: HOSPADM

## 2022-12-06 RX ORDER — POTASSIUM CHLORIDE 1.5 G/1.77G
40 POWDER, FOR SOLUTION ORAL AS NEEDED
Status: DISCONTINUED | OUTPATIENT
Start: 2022-12-06 | End: 2022-12-07 | Stop reason: HOSPADM

## 2022-12-06 RX ORDER — ENOXAPARIN SODIUM 100 MG/ML
40 INJECTION SUBCUTANEOUS EVERY 24 HOURS
Status: DISCONTINUED | OUTPATIENT
Start: 2022-12-06 | End: 2022-12-07 | Stop reason: HOSPADM

## 2022-12-06 RX ORDER — POTASSIUM CHLORIDE 7.45 MG/ML
10 INJECTION INTRAVENOUS
Status: DISCONTINUED | OUTPATIENT
Start: 2022-12-06 | End: 2022-12-07 | Stop reason: HOSPADM

## 2022-12-06 RX ORDER — TAMSULOSIN HYDROCHLORIDE 0.4 MG/1
1 CAPSULE ORAL DAILY
COMMUNITY

## 2022-12-06 RX ORDER — TAMSULOSIN HYDROCHLORIDE 0.4 MG/1
0.4 CAPSULE ORAL DAILY
Status: DISCONTINUED | OUTPATIENT
Start: 2022-12-06 | End: 2022-12-07 | Stop reason: HOSPADM

## 2022-12-06 RX ORDER — FUROSEMIDE 10 MG/ML
40 INJECTION INTRAMUSCULAR; INTRAVENOUS ONCE
Status: COMPLETED | OUTPATIENT
Start: 2022-12-06 | End: 2022-12-06

## 2022-12-06 RX ORDER — DEXTROSE MONOHYDRATE 25 G/50ML
25 INJECTION, SOLUTION INTRAVENOUS
Status: DISCONTINUED | OUTPATIENT
Start: 2022-12-06 | End: 2022-12-07 | Stop reason: HOSPADM

## 2022-12-06 RX ORDER — INSULIN LISPRO 100 [IU]/ML
0-7 INJECTION, SOLUTION INTRAVENOUS; SUBCUTANEOUS
Status: DISCONTINUED | OUTPATIENT
Start: 2022-12-06 | End: 2022-12-07 | Stop reason: HOSPADM

## 2022-12-06 RX ORDER — POTASSIUM CHLORIDE 750 MG/1
40 TABLET, FILM COATED, EXTENDED RELEASE ORAL AS NEEDED
Status: DISCONTINUED | OUTPATIENT
Start: 2022-12-06 | End: 2022-12-07 | Stop reason: HOSPADM

## 2022-12-06 RX ORDER — NITROGLYCERIN 0.4 MG/1
0.4 TABLET SUBLINGUAL
Status: DISCONTINUED | OUTPATIENT
Start: 2022-12-06 | End: 2022-12-07 | Stop reason: HOSPADM

## 2022-12-06 RX ORDER — METOPROLOL SUCCINATE 50 MG/1
50 TABLET, EXTENDED RELEASE ORAL
Status: DISCONTINUED | OUTPATIENT
Start: 2022-12-06 | End: 2022-12-07 | Stop reason: HOSPADM

## 2022-12-06 RX ORDER — PANTOPRAZOLE SODIUM 40 MG/1
40 TABLET, DELAYED RELEASE ORAL
Status: DISCONTINUED | OUTPATIENT
Start: 2022-12-06 | End: 2022-12-07 | Stop reason: HOSPADM

## 2022-12-06 RX ORDER — VALSARTAN 160 MG/1
160 TABLET ORAL
Status: DISCONTINUED | OUTPATIENT
Start: 2022-12-06 | End: 2022-12-07 | Stop reason: HOSPADM

## 2022-12-06 RX ADMIN — DULOXETINE HYDROCHLORIDE 60 MG: 60 CAPSULE, DELAYED RELEASE ORAL at 12:45

## 2022-12-06 RX ADMIN — ENOXAPARIN SODIUM 40 MG: 100 INJECTION SUBCUTANEOUS at 09:04

## 2022-12-06 RX ADMIN — VALSARTAN 160 MG: 160 TABLET, FILM COATED ORAL at 12:45

## 2022-12-06 RX ADMIN — ROPINIROLE 2 MG: 2 TABLET, FILM COATED ORAL at 20:55

## 2022-12-06 RX ADMIN — INSULIN LISPRO 2 UNITS: 100 INJECTION, SOLUTION INTRAVENOUS; SUBCUTANEOUS at 17:43

## 2022-12-06 RX ADMIN — METOPROLOL SUCCINATE 50 MG: 25 TABLET, EXTENDED RELEASE ORAL at 09:00

## 2022-12-06 RX ADMIN — TAMSULOSIN HYDROCHLORIDE 0.4 MG: 0.4 CAPSULE ORAL at 09:00

## 2022-12-06 RX ADMIN — POTASSIUM CHLORIDE 40 MEQ: 750 TABLET, EXTENDED RELEASE ORAL at 17:44

## 2022-12-06 RX ADMIN — FUROSEMIDE 40 MG: 10 INJECTION, SOLUTION INTRAMUSCULAR; INTRAVENOUS at 09:02

## 2022-12-06 RX ADMIN — INSULIN GLARGINE-YFGN 20 UNITS: 100 INJECTION, SOLUTION SUBCUTANEOUS at 12:46

## 2022-12-06 RX ADMIN — ROPINIROLE 2 MG: 2 TABLET, FILM COATED ORAL at 12:45

## 2022-12-06 RX ADMIN — Medication 10 ML: at 12:48

## 2022-12-06 RX ADMIN — POTASSIUM CHLORIDE 40 MEQ: 750 TABLET, EXTENDED RELEASE ORAL at 20:55

## 2022-12-06 RX ADMIN — Medication 10 ML: at 20:57

## 2022-12-06 RX ADMIN — PANTOPRAZOLE SODIUM 40 MG: 40 TABLET, DELAYED RELEASE ORAL at 12:47

## 2022-12-06 NOTE — H&P
History:  This is a nice 72-year-old gentleman who was admitted late last night with anasarca and polyarticular joint pain.  He is a little more comfortable today.  He complains of aching and stiffness in his shoulders and his hips.  Furthermore he has some discomfort in his hands with movement of his fingers.  He has significant swelling in his arms and legs as well as some presacral edema.    Allergies  Patient has no known allergies.      Current Facility-Administered Medications:   •  [COMPLETED] Insert Peripheral IV, , , Once **AND** sodium chloride 0.9 % flush 10 mL, 10 mL, Intravenous, PRN, Sarabjit Escobar MD  No current outpatient medications on file.    /83   Pulse 87   Temp 98.6 °F (37 °C) (Oral)   Resp 17   SpO2 93%     Physical Exam  Appearance: 72-year-old  gentleman in no acute distress.  HEENT: Normocephalic atraumatic.  Pupils round and equal.  Pharynx slightly dry  Lungs: Clear to auscultation percussion  Heart: Regular rate and rhythm.  Abdomen: Protuberant benign.  No rebound, tenderness or guarding  Extremities: With diffuse pitting edema which was 2+ in the arms and legs.  Joint exam: Mild synovitis in the MCPs and wrists.  Neurologic: Nonfocal.  Lab Results (last 24 hours)     Procedure Component Value Units Date/Time    Comprehensive Metabolic Panel [[id number]]  (Abnormal) Collected: 12/05/22 2125    Specimen: Blood Updated: 12/05/22 2224     Glucose 141 mg/dL      BUN 17 mg/dL      Creatinine 0.86 mg/dL      Sodium 136 mmol/L      Potassium 3.4 mmol/L      Chloride 95 mmol/L      CO2 26.0 mmol/L      Calcium 9.2 mg/dL      Total Protein 6.7 g/dL      Albumin 3.70 g/dL      ALT (SGPT) 18 U/L      AST (SGOT) 14 U/L      Alkaline Phosphatase 80 U/L      Total Bilirubin 0.4 mg/dL      Globulin 3.0 gm/dL      A/G Ratio 1.2 g/dL      BUN/Creatinine Ratio 19.8     Anion Gap 15.0 mmol/L      eGFR 92.6 mL/min/1.73      Comment: National Kidney Foundation and American Society of  Nephrology (ASN) Task Force recommended calculation based on the Chronic Kidney Disease Epidemiology Collaboration (CKD-EPI) equation refit without adjustment for race.       Narrative:      GFR Normal >60  Chronic Kidney Disease <60  Kidney Failure <15    The GFR formula is only valid for adults with stable renal function between ages 18 and 70.    Lipase [[id number]]  (Normal) Collected: 12/05/22 2125    Specimen: Blood Updated: 12/05/22 2224     Lipase 16 U/L     Troponin [[id number]]  (Normal) Collected: 12/05/22 2125    Specimen: Blood Updated: 12/05/22 2224     Troponin T <0.010 ng/mL     Narrative:      Troponin T Reference Range:  <= 0.03 ng/mL-   Negative for AMI  >0.03 ng/mL-     Abnormal for myocardial necrosis.  Clinicians would have to utilize clinical acumen, EKG, Troponin and serial changes to determine if it is an Acute Myocardial Infarction or myocardial injury due to an underlying chronic condition.       Results may be falsely decreased if patient taking Biotin.      BNP [[id number]]  (Normal) Collected: 12/05/22 2125    Specimen: Blood Updated: 12/05/22 2150     proBNP 255.0 pg/mL     Narrative:      Among patients with dyspnea, NT-proBNP is highly sensitive for the detection of acute congestive heart failure. In addition NT-proBNP of <300 pg/ml effectively rules out acute congestive heart failure with 99% negative predictive value.    Results may be falsely decreased if patient taking Biotin.      CBC & Differential [[id number]]  (Abnormal) Collected: 12/05/22 2125    Specimen: Blood Updated: 12/05/22 2133    Narrative:      The following orders were created for panel order CBC & Differential.  Procedure                               Abnormality         Status                     ---------                               -----------         ------                     CBC Auto Differential[[id number]]        Abnormal            Final result                 Please view results for these tests on the  individual orders.    CBC Auto Differential [[id number]]  (Abnormal) Collected: 12/05/22 2125    Specimen: Blood Updated: 12/05/22 2133     WBC 11.31 10*3/mm3      RBC 3.96 10*6/mm3      Hemoglobin 11.4 g/dL      Hematocrit 34.7 %      MCV 87.6 fL      MCH 28.8 pg      MCHC 32.9 g/dL      RDW 13.7 %      RDW-SD 42.9 fl      MPV 9.3 fL      Platelets 265 10*3/mm3      Neutrophil % 77.8 %      Lymphocyte % 12.7 %      Monocyte % 7.5 %      Eosinophil % 1.1 %      Basophil % 0.3 %      Immature Grans % 0.6 %      Neutrophils, Absolute 8.80 10*3/mm3      Lymphocytes, Absolute 1.44 10*3/mm3      Monocytes, Absolute 0.85 10*3/mm3      Eosinophils, Absolute 0.12 10*3/mm3      Basophils, Absolute 0.03 10*3/mm3      Immature Grans, Absolute 0.07 10*3/mm3      nRBC 0.1 /100 WBC           Imp:  1.  Anasarca.    2.  Polyarticular synovitis.  Significant proximal disease.  3.  Essential hypertension.  4.  Type 2 diabetes mellitus  5.  BPH with bladder outlet obstruction  6.  Hyperlipidemia  7.  Bilateral carpal tunnel related to significant anasarca.  8.  Normocytic anemia which has developed over the past few    The patient has anasarca but has no strong signal for cardiac renal or hepatic pathology.  He has no protein-losing enteropathy.  He has a positive NATASHA and high sed rate.  I am not entirely sure of his diagnosis but I think RSSSPE  is most likely.  Obviously cannot rule out systemic lupus.  Also cannot rule out significant cardiac disease such as pericardial effusion or infiltrative condition such as amyloidosis.  Urinalysis done in my office showed absolutely no protein      Plan:  I am going to schedule an echocardiogram and ultrasound of the kidneys.  I am going to check some more autoantibodies as well as CPK.  I will start physical therapy.  I will start furosemide.  Patient's diabetes will be managed with basal bolus regimen.  I am going to hold his metformin    Phani Conn MD  12/6/2022  07:40 EST

## 2022-12-06 NOTE — PROGRESS NOTES
[facility] Clinical Pharmacy Services: Enoxaparin Consult    Mark Ken has a pharmacy consult to dose prophylactic enoxaparin per Dr. Conn's request.     Indication: VTE Prophylaxis  Home Anticoagulation: none     Relevant clinical data and objective history reviewed:  72 y.o. male   104 kg (229 lb 8 oz)   Body mass index is 35.94 kg/m².   Results from last 7 days   Lab Units 12/05/22  2125   PLATELETS 10*3/mm3 265     Estimated Creatinine Clearance: 90.6 mL/min (by C-G formula based on SCr of 0.86 mg/dL).    Assessment/Plan    Will start patient on 40mg subcutaneous every 24 hours, adjusted for renal function. Consult order will be discontinued but pharmacy will continue to follow.     Jarad Cabrales RPH  Clinical Pharmacist

## 2022-12-06 NOTE — ED NOTES
".Nursing report ED to floor  Mark Ken  72 y.o.  male    HPI :   Chief Complaint   Patient presents with    Joint Swelling       Admitting doctor:   Phani Conn MD    Admitting diagnosis:   The primary encounter diagnosis was Bilateral lower extremity edema. Diagnoses of Edema of upper extremity and Exertional dyspnea were also pertinent to this visit.    Code status:   Current Code Status       Date Active Code Status Order ID Comments User Context       12/6/2022 0739 CPR (Attempt to Resuscitate) [id number]  Phani Conn MD ED        Question Answer    Code Status (Patient has no pulse and is not breathing) CPR (Attempt to Resuscitate)    Medical Interventions (Patient has pulse or is breathing) Full Support    Level Of Support Discussed With Patient                    Allergies:   Patient has no known allergies.    Isolation:   No active isolations    Intake and Output    Intake/Output Summary (Last 24 hours) at 12/6/2022 1051  Last data filed at 12/6/2022 0905  Gross per 24 hour   Intake --   Output 400 ml   Net -400 ml       Weight:       12/06/22  1033   Weight: 104 kg (229 lb)       Most recent vitals:   Vitals:    12/06/22 0756 12/06/22 0800 12/06/22 0839 12/06/22 1033   BP: 142/86   136/87   Pulse: 88   88   Resp:       Temp:       TempSrc:       SpO2: 93%      Weight:  104 kg (229 lb 8 oz) 104 kg (229 lb 8 oz) 104 kg (229 lb)   Height:    170.2 cm (67\")       Active LDAs/IV Access:   Lines, Drains & Airways       Active LDAs       Name Placement date Placement time Site Days    Peripheral IV 12/05/22 2142 Right Antecubital 12/05/22 2142  Antecubital  less than 1                    Labs (abnormal labs have a star):   Labs Reviewed   COMPREHENSIVE METABOLIC PANEL - Abnormal; Notable for the following components:       Result Value    Glucose 141 (*)     Potassium 3.4 (*)     Chloride 95 (*)     All other components within normal limits    Narrative:     GFR Normal >60  Chronic Kidney Disease " <60  Kidney Failure <15    The GFR formula is only valid for adults with stable renal function between ages 18 and 70.   CBC WITH AUTO DIFFERENTIAL - Abnormal; Notable for the following components:    WBC 11.31 (*)     RBC 3.96 (*)     Hemoglobin 11.4 (*)     Hematocrit 34.7 (*)     Neutrophil % 77.8 (*)     Lymphocyte % 12.7 (*)     Immature Grans % 0.6 (*)     Neutrophils, Absolute 8.80 (*)     Immature Grans, Absolute 0.07 (*)     All other components within normal limits   BASIC METABOLIC PANEL - Abnormal; Notable for the following components:    Glucose 122 (*)     Potassium 3.4 (*)     All other components within normal limits    Narrative:     GFR Normal >60  Chronic Kidney Disease <60  Kidney Failure <15    The GFR formula is only valid for adults with stable renal function between ages 18 and 70.   URINALYSIS W/ MICROSCOPIC IF INDICATED (NO CULTURE) - Abnormal; Notable for the following components:    pH, UA >=9.0 (*)     All other components within normal limits    Narrative:     Urine microscopic not indicated.   SEDIMENTATION RATE - Abnormal; Notable for the following components:    Sed Rate 39 (*)     All other components within normal limits   C-REACTIVE PROTEIN - Abnormal; Notable for the following components:    C-Reactive Protein 7.60 (*)     All other components within normal limits   LIPASE - Normal   BNP (IN-HOUSE) - Normal    Narrative:     Among patients with dyspnea, NT-proBNP is highly sensitive for the detection of acute congestive heart failure. In addition NT-proBNP of <300 pg/ml effectively rules out acute congestive heart failure with 99% negative predictive value.    Results may be falsely decreased if patient taking Biotin.     TROPONIN (IN-HOUSE) - Normal    Narrative:     Troponin T Reference Range:  <= 0.03 ng/mL-   Negative for AMI  >0.03 ng/mL-     Abnormal for myocardial necrosis.  Clinicians would have to utilize clinical acumen, EKG, Troponin and serial changes to determine if  it is an Acute Myocardial Infarction or myocardial injury due to an underlying chronic condition.       Results may be falsely decreased if patient taking Biotin.     MAGNESIUM - Normal   PHOSPHORUS - Normal   TSH - Normal   C4+C3 - Normal   CK - Normal   LIPID PANEL    Narrative:     Cholesterol Reference Ranges  (U.S. Department of Health and Human Services ATP III Classifications)    Desirable          <200 mg/dL  Borderline High    200-239 mg/dL  High Risk          >240 mg/dL      Triglyceride Reference Ranges  (U.S. Department of Health and Human Services ATP III Classifications)    Normal           <150 mg/dL  Borderline High  150-199 mg/dL  High             200-499 mg/dL  Very High        >500 mg/dL    HDL Reference Ranges  (U.S. Department of Health and Human Services ATP III Classifications)    Low     <40 mg/dl (major risk factor for CHD)  High    >60 mg/dl ('negative' risk factor for CHD)        LDL Reference Ranges  (U.S. Department of Health and Human Services ATP III Classifications)    Optimal          <100 mg/dL  Near Optimal     100-129 mg/dL  Borderline High  130-159 mg/dL  High             160-189 mg/dL  Very High        >189 mg/dL   ANTI-DNA ANTIBODY, DOUBLE-STRANDED   EXTRACTABLE NUCLEAR ANTIGEN ANTIBODIES   NATASHA W/REFLEX   POCT GLUCOSE FINGERSTICK   POCT GLUCOSE FINGERSTICK   POCT GLUCOSE FINGERSTICK   CBC AND DIFFERENTIAL    Narrative:     The following orders were created for panel order CBC & Differential.  Procedure                               Abnormality         Status                     ---------                               -----------         ------                     CBC Auto Differential[[id number]]        Abnormal            Final result                 Please view results for these tests on the individual orders.       EKG:   ECG 12 Lead Other; swelling/soa   Preliminary Result   HEART RATE= 99  bpm   RR Interval= 606  ms   WI Interval= 153  ms   P Horizontal Axis= 24  deg   P  Front Axis= 41  deg   QRSD Interval= 84  ms   QT Interval= 360  ms   QRS Axis= 51  deg   T Wave Axis= 37  deg   - BORDERLINE ECG -   Sinus rhythm   Borderline T wave abnormalities   Baseline wander in lead(s) III,aVL   Electronically Signed By:    Date and Time of Study: 2022-12-05 21:31:25          Meds given in ED:   Medications   sodium chloride 0.9 % flush 10 mL (has no administration in time range)   sodium chloride 0.9 % flush 10 mL (has no administration in time range)   sodium chloride 0.9 % flush 10 mL (has no administration in time range)   sodium chloride 0.9 % infusion 40 mL (has no administration in time range)   Pharmacy to Dose enoxaparin (LOVENOX) (has no administration in time range)   nitroglycerin (NITROSTAT) SL tablet 0.4 mg (has no administration in time range)   DULoxetine (CYMBALTA) DR capsule 60 mg (has no administration in time range)   metoprolol succinate XL (TOPROL-XL) 24 hr tablet 50 mg (50 mg Oral Given 12/6/22 0900)   valsartan (DIOVAN) tablet 160 mg (has no administration in time range)   pantoprazole (PROTONIX) EC tablet 40 mg (has no administration in time range)   tamsulosin (FLOMAX) 24 hr capsule 0.4 mg (0.4 mg Oral Given 12/6/22 0900)   rOPINIRole (REQUIP) tablet 2 mg (has no administration in time range)   insulin glargine (LANTUS, SEMGLEE) injection 20 Units (has no administration in time range)   dextrose (GLUTOSE) oral gel 15 g (has no administration in time range)   dextrose (D50W) (25 g/50 mL) IV injection 25 g (has no administration in time range)   glucagon (human recombinant) (GLUCAGEN DIAGNOSTIC) injection 1 mg (has no administration in time range)   insulin lispro (ADMELOG) injection 0-7 Units (has no administration in time range)   Enoxaparin Sodium (LOVENOX) syringe 40 mg (40 mg Subcutaneous Given 12/6/22 0904)   furosemide (LASIX) injection 40 mg (40 mg Intravenous Given 12/6/22 0902)       Imaging results:  XR Chest 1 View    Result Date: 12/5/2022  No evidence for  active disease in the chest.  This report was finalized on 12/5/2022 10:09 PM by Dr. Parish Sánchez M.D.       Ambulatory status:   - ad navya    Social issues:   Social History     Socioeconomic History    Marital status: Unknown              Ariella Hopson RN  12/06/22 10:51 EST

## 2022-12-06 NOTE — PAYOR COMM NOTE
"Kathrin Ken (72 y.o. Male)     PLEASE SEE ATTACHED FOR OBSERVATION AUTH.     PLEASE CALL  OR  [phone]    THANK YOU    SAMMIE RATLIFF LPN [facility]    Date of Birth   1950    Social Security Number       Address   [address] [place], [place] [address]    Home Phone   [phone]    MRN   [id number]       Caodaism   Patient Refused    Marital Status   Unknown                            Admission Date   12/5/22    Admission Type   Emergency    Admitting Provider   Phani Conn MD    Attending Provider   Phani Conn MD    Department, Room/Bed   [facility] OBSERVATION, 128/1       Discharge Date       Discharge Disposition       Discharge Destination                               Attending Provider: Phani Conn MD    Allergies: No Known Allergies    Isolation: None   Infection: None   Code Status: CPR    Ht: 170.2 cm (67\")   Wt: 104 kg (229 lb)    Admission Cmt: None   Principal Problem: Bilateral lower extremity edema [R60.0]                 Active Insurance as of 12/5/2022     Primary Coverage     Payor Plan Insurance Group Employer/Plan Group    [facility] MEDICARE REPLACEMENT WELLC[employer] MEDICARE REPLACEMENT      Payor Plan Address Payor Plan Phone Number Payor Plan Fax Number Effective Dates    [address] [address] [phone]  12/1/2022 - None Entered    [facility] [address]       Subscriber Name Subscriber Birth Date Member ID       KATHRIN KEN 1950 [id number]           Secondary Coverage     Payor Plan Insurance Group Employer/Plan Group    [employer] [place] [employer] [place]      Payor Plan Address Payor Plan Phone Number Payor Plan Fax Number Effective Dates    [address] [employer] [place] [phone]  8/1/2016 - None Entered    [facility] [id number]       Subscriber Name Subscriber Birth Date Member ID       KATHRIN KEN 1950 [id number]                 Emergency Contacts      (Rel.) Home Phone Work Phone Mobile Phone    TAYLORDIONYLYN (Son) " "-- -- [phone]            Joaquina: NPI [id number]  Tax ID [id number]  History & Physical    No notes of this type exist for this encounter.            Emergency Department Notes      Irwin Boyd RN at 12/05/22 1444        Pt presents to ED with complaints of increased generalized swelling. Pt was told by PCP to be evaluated in the ER for possible kidney dysfunction. Pt complaints of all over joint pain.     Electronically signed by Irwin Boyd RN at 12/05/22 1449     Ariella Hopson RN at 12/06/22 1051          .Nursing report ED to floor  Mark Ken  72 y.o.  male    HPI :   Chief Complaint   Patient presents with   • Joint Swelling       Admitting doctor:   Phani Conn MD    Admitting diagnosis:   The primary encounter diagnosis was Bilateral lower extremity edema. Diagnoses of Edema of upper extremity and Exertional dyspnea were also pertinent to this visit.    Code status:   Current Code Status       Date Active Code Status Order ID Comments User Context       12/6/2022 0739 CPR (Attempt to Resuscitate) [id number]  Phani Conn MD ED        Question Answer    Code Status (Patient has no pulse and is not breathing) CPR (Attempt to Resuscitate)    Medical Interventions (Patient has pulse or is breathing) Full Support    Level Of Support Discussed With Patient                    Allergies:   Patient has no known allergies.    Isolation:   No active isolations    Intake and Output    Intake/Output Summary (Last 24 hours) at 12/6/2022 1051  Last data filed at 12/6/2022 0905  Gross per 24 hour   Intake --   Output 400 ml   Net -400 ml       Weight:       12/06/22  1033   Weight: 104 kg (229 lb)       Most recent vitals:   Vitals:    12/06/22 0756 12/06/22 0800 12/06/22 0839 12/06/22 1033   BP: 142/86   136/87   Pulse: 88   88   Resp:       Temp:       TempSrc:       SpO2: 93%      Weight:  104 kg (229 lb 8 oz) 104 kg (229 lb 8 oz) 104 kg (229 lb)   Height:    170.2 cm (67\")       Active " LDAs/IV Access:   Lines, Drains & Airways       Active LDAs       Name Placement date Placement time Site Days    Peripheral IV 12/05/22 2142 Right Antecubital 12/05/22 2142  Antecubital  less than 1                    Labs (abnormal labs have a star):   Labs Reviewed   COMPREHENSIVE METABOLIC PANEL - Abnormal; Notable for the following components:       Result Value    Glucose 141 (*)     Potassium 3.4 (*)     Chloride 95 (*)     All other components within normal limits    Narrative:     GFR Normal >60  Chronic Kidney Disease <60  Kidney Failure <15    The GFR formula is only valid for adults with stable renal function between ages 18 and 70.   CBC WITH AUTO DIFFERENTIAL - Abnormal; Notable for the following components:    WBC 11.31 (*)     RBC 3.96 (*)     Hemoglobin 11.4 (*)     Hematocrit 34.7 (*)     Neutrophil % 77.8 (*)     Lymphocyte % 12.7 (*)     Immature Grans % 0.6 (*)     Neutrophils, Absolute 8.80 (*)     Immature Grans, Absolute 0.07 (*)     All other components within normal limits   BASIC METABOLIC PANEL - Abnormal; Notable for the following components:    Glucose 122 (*)     Potassium 3.4 (*)     All other components within normal limits    Narrative:     GFR Normal >60  Chronic Kidney Disease <60  Kidney Failure <15    The GFR formula is only valid for adults with stable renal function between ages 18 and 70.   URINALYSIS W/ MICROSCOPIC IF INDICATED (NO CULTURE) - Abnormal; Notable for the following components:    pH, UA >=9.0 (*)     All other components within normal limits    Narrative:     Urine microscopic not indicated.   SEDIMENTATION RATE - Abnormal; Notable for the following components:    Sed Rate 39 (*)     All other components within normal limits   C-REACTIVE PROTEIN - Abnormal; Notable for the following components:    C-Reactive Protein 7.60 (*)     All other components within normal limits   LIPASE - Normal   BNP (IN-HOUSE) - Normal    Narrative:     Among patients with dyspnea,  NT-proBNP is highly sensitive for the detection of acute congestive heart failure. In addition NT-proBNP of <300 pg/ml effectively rules out acute congestive heart failure with 99% negative predictive value.    Results may be falsely decreased if patient taking Biotin.     TROPONIN (IN-HOUSE) - Normal    Narrative:     Troponin T Reference Range:  <= 0.03 ng/mL-   Negative for AMI  >0.03 ng/mL-     Abnormal for myocardial necrosis.  Clinicians would have to utilize clinical acumen, EKG, Troponin and serial changes to determine if it is an Acute Myocardial Infarction or myocardial injury due to an underlying chronic condition.       Results may be falsely decreased if patient taking Biotin.     MAGNESIUM - Normal   PHOSPHORUS - Normal   TSH - Normal   C4+C3 - Normal   CK - Normal   LIPID PANEL    Narrative:     Cholesterol Reference Ranges  (U.S. Department of Health and Human Services ATP III Classifications)    Desirable          <200 mg/dL  Borderline High    200-239 mg/dL  High Risk          >240 mg/dL      Triglyceride Reference Ranges  (U.S. Department of Health and Human Services ATP III Classifications)    Normal           <150 mg/dL  Borderline High  150-199 mg/dL  High             200-499 mg/dL  Very High        >500 mg/dL    HDL Reference Ranges  (U.S. Department of Health and Human Services ATP III Classifications)    Low     <40 mg/dl (major risk factor for CHD)  High    >60 mg/dl ('negative' risk factor for CHD)        LDL Reference Ranges  (U.S. Department of Health and Human Services ATP III Classifications)    Optimal          <100 mg/dL  Near Optimal     100-129 mg/dL  Borderline High  130-159 mg/dL  High             160-189 mg/dL  Very High        >189 mg/dL   ANTI-DNA ANTIBODY, DOUBLE-STRANDED   EXTRACTABLE NUCLEAR ANTIGEN ANTIBODIES   NATASHA W/REFLEX   POCT GLUCOSE FINGERSTICK   POCT GLUCOSE FINGERSTICK   POCT GLUCOSE FINGERSTICK   CBC AND DIFFERENTIAL    Narrative:     The following orders were  created for panel order CBC & Differential.  Procedure                               Abnormality         Status                     ---------                               -----------         ------                     CBC Auto Differential[[id number]]        Abnormal            Final result                 Please view results for these tests on the individual orders.       EKG:   ECG 12 Lead Other; swelling/soa   Preliminary Result   HEART RATE= 99  bpm   RR Interval= 606  ms   ME Interval= 153  ms   P Horizontal Axis= 24  deg   P Front Axis= 41  deg   QRSD Interval= 84  ms   QT Interval= 360  ms   QRS Axis= 51  deg   T Wave Axis= 37  deg   - BORDERLINE ECG -   Sinus rhythm   Borderline T wave abnormalities   Baseline wander in lead(s) III,aVL   Electronically Signed By:    Date and Time of Study: 2022-12-05 21:31:25          Meds given in ED:   Medications   sodium chloride 0.9 % flush 10 mL (has no administration in time range)   sodium chloride 0.9 % flush 10 mL (has no administration in time range)   sodium chloride 0.9 % flush 10 mL (has no administration in time range)   sodium chloride 0.9 % infusion 40 mL (has no administration in time range)   Pharmacy to Dose enoxaparin (LOVENOX) (has no administration in time range)   nitroglycerin (NITROSTAT) SL tablet 0.4 mg (has no administration in time range)   DULoxetine (CYMBALTA) DR capsule 60 mg (has no administration in time range)   metoprolol succinate XL (TOPROL-XL) 24 hr tablet 50 mg (50 mg Oral Given 12/6/22 0900)   valsartan (DIOVAN) tablet 160 mg (has no administration in time range)   pantoprazole (PROTONIX) EC tablet 40 mg (has no administration in time range)   tamsulosin (FLOMAX) 24 hr capsule 0.4 mg (0.4 mg Oral Given 12/6/22 0900)   rOPINIRole (REQUIP) tablet 2 mg (has no administration in time range)   insulin glargine (LANTUS, SEMGLEE) injection 20 Units (has no administration in time range)   dextrose (GLUTOSE) oral gel 15 g (has no  administration in time range)   dextrose (D50W) (25 g/50 mL) IV injection 25 g (has no administration in time range)   glucagon (human recombinant) (GLUCAGEN DIAGNOSTIC) injection 1 mg (has no administration in time range)   insulin lispro (ADMELOG) injection 0-7 Units (has no administration in time range)   Enoxaparin Sodium (LOVENOX) syringe 40 mg (40 mg Subcutaneous Given 12/6/22 0904)   furosemide (LASIX) injection 40 mg (40 mg Intravenous Given 12/6/22 0902)       Imaging results:  XR Chest 1 View    Result Date: 12/5/2022  No evidence for active disease in the chest.  This report was finalized on 12/5/2022 10:09 PM by Dr. Parish Sánchez M.D.       Ambulatory status:   - ad navya    Social issues:   Social History     Socioeconomic History   • Marital status: Unknown              Ariella Hopson RN  12/06/22 10:51 EST          Electronically signed by Ariella Hopson RN at 12/06/22 4334

## 2022-12-06 NOTE — ED PROVIDER NOTES
EMERGENCY DEPARTMENT ENCOUNTER    CHIEF COMPLAINT  Chief Complaint: Swelling  History given by: Patient  History limited by: None  Room Number: 128/1  PMD: Phani Conn MD      HPI:  Pt is a 72 y.o. male who presents complaining of swelling diffusely in his upper and lower extremities bilaterally that is been progressively worsening over the past week to week and a half.  The patient reports that he has sustained a roughly 25 pound weight gain in this timeframe as well.  He reports that he does have a history of rheumatoid arthritis but has never had this amount of swelling previously.  He states that he has had shortness of breath with exertion associated with the symptoms.  He denies fever/chills, chest pain, headache, nausea/vomiting, or cough.    Duration: Ongoing for greater than the past week  Onset: Gradual  Location: Bilateral upper and lower extremities  Radiation: None  Quality: Swelling  Intensity/Severity: Moderate  Progression: Worsening  Associated Symptoms: Shortness of breath  Aggravating Factors: Exertion  Alleviating Factors: None  Previous Episodes: None  Treatment before arrival: None    PAST MEDICAL HISTORY  Active Ambulatory Problems     Diagnosis Date Noted   • No Active Ambulatory Problems     Resolved Ambulatory Problems     Diagnosis Date Noted   • No Resolved Ambulatory Problems     Past Medical History:   Diagnosis Date   • BPH (benign prostatic hyperplasia)    • Carpal tunnel syndrome    • Diabetes mellitus (HCC)    • Hyperlipidemia        PAST SURGICAL HISTORY  History reviewed. No pertinent surgical history.    FAMILY HISTORY  History reviewed. No pertinent family history.    SOCIAL HISTORY  Social History     Socioeconomic History   • Marital status: Unknown   Tobacco Use   • Smoking status: Never       ALLERGIES  Patient has no known allergies.    REVIEW OF SYSTEMS  Review of Systems   Constitutional: Negative for activity change, appetite change and fever.   HENT: Negative for  congestion and sore throat.    Eyes: Negative.    Respiratory: Positive for shortness of breath. Negative for cough.    Cardiovascular: Positive for leg swelling. Negative for chest pain.   Gastrointestinal: Negative for abdominal pain, diarrhea and vomiting.   Endocrine: Negative.    Genitourinary: Negative for decreased urine volume and dysuria.   Musculoskeletal: Negative for neck pain.   Skin: Negative for rash and wound.   Allergic/Immunologic: Negative.    Neurological: Negative for weakness, numbness and headaches.   Hematological: Negative.    Psychiatric/Behavioral: Negative.    All other systems reviewed and are negative.      PHYSICAL EXAM  ED Triage Vitals [12/05/22 1450]   Temp Heart Rate Resp BP SpO2   98.6 °F (37 °C) 112 18 (!) 196/96 97 %      Temp src Heart Rate Source Patient Position BP Location FiO2 (%)   Oral Monitor Lying Right arm --       Physical Exam  Vitals and nursing note reviewed.   Constitutional:       General: He is not in acute distress.  HENT:      Head: Normocephalic and atraumatic.   Eyes:      Extraocular Movements: EOM normal.      Pupils: Pupils are equal, round, and reactive to light.   Cardiovascular:      Rate and Rhythm: Normal rate and regular rhythm.      Heart sounds: Normal heart sounds.   Pulmonary:      Effort: Pulmonary effort is normal. No respiratory distress.      Breath sounds: Normal breath sounds.   Abdominal:      Palpations: Abdomen is soft.      Tenderness: There is no abdominal tenderness. There is no guarding or rebound.   Musculoskeletal:         General: Normal range of motion.      Cervical back: Normal range of motion and neck supple.      Right lower leg: Edema present.      Left lower leg: Edema present.      Comments: Swelling present to bilateral upper extremities  Compartments soft, pulses palpable   Skin:     General: Skin is warm and dry.   Neurological:      Mental Status: He is alert and oriented to person, place, and time.      Sensory:  Sensation is intact.      Motor: Motor strength is normal.   Psychiatric:         Mood and Affect: Mood and affect normal.         LAB RESULTS  Lab Results (last 24 hours)     Procedure Component Value Units Date/Time    Basic Metabolic Panel [[id number]]  (Abnormal) Collected: 12/06/22 0851    Specimen: Blood Updated: 12/06/22 0935     Glucose 122 mg/dL      BUN 14 mg/dL      Creatinine 0.95 mg/dL      Sodium 140 mmol/L      Potassium 3.4 mmol/L      Chloride 101 mmol/L      CO2 28.0 mmol/L      Calcium 9.4 mg/dL      BUN/Creatinine Ratio 14.7     Anion Gap 11.0 mmol/L      eGFR 85.6 mL/min/1.73      Comment: National Kidney Foundation and American Society of Nephrology (ASN) Task Force recommended calculation based on the Chronic Kidney Disease Epidemiology Collaboration (CKD-EPI) equation refit without adjustment for race.       Narrative:      GFR Normal >60  Chronic Kidney Disease <60  Kidney Failure <15    The GFR formula is only valid for adults with stable renal function between ages 18 and 70.    Sedimentation Rate [[id number]]  (Abnormal) Collected: 12/06/22 0851    Specimen: Blood Updated: 12/06/22 0929     Sed Rate 39 mm/hr     C-reactive Protein [[id number]]  (Abnormal) Collected: 12/06/22 0851    Specimen: Blood Updated: 12/06/22 0933     C-Reactive Protein 7.60 mg/dL     Anti-DNA Antibody, Double-stranded [[id number]] Collected: 12/06/22 0851    Specimen: Blood Updated: 12/06/22 0857    Extractable Nuclear Antigen Antibodies [[id number]] Collected: 12/06/22 0851    Specimen: Blood Updated: 12/06/22 0857    NATASHA With / DsDNA, RNP, Sjogrens A / B, [place]ila [[id number]] Collected: 12/06/22 0851    Specimen: Blood Updated: 12/06/22 0857    C4+C3 [[id number]]  (Normal) Collected: 12/06/22 0851    Specimen: Blood Updated: 12/06/22 0934     C3 Complement 145.0 mg/dl      C4 Complement 38.0 mg/dl     CK [[id number]]  (Normal) Collected: 12/06/22 0851    Specimen: Blood Updated: 12/06/22 0933     Creatine Kinase 78 U/L      Urinalysis With Microscopic If Indicated (No Culture) - Urine, Clean Catch [[id number]]  (Abnormal) Collected: 12/06/22 0905    Specimen: Urine, Clean Catch Updated: 12/06/22 0921     Color, UA Yellow     Appearance, UA Clear     pH, UA >=9.0     Specific Gravity, UA 1.008     Glucose, UA Negative     Ketones, UA Negative     Bilirubin, UA Negative     Blood, UA Negative     Protein, UA Negative     Leuk Esterase, UA Negative     Nitrite, UA Negative     Urobilinogen, UA 0.2 E.U./dL    Narrative:      Urine microscopic not indicated.    POC Glucose Once [[id number]]  (Normal) Collected: 12/06/22 1211    Specimen: Blood Updated: 12/06/22 1213     Glucose 113 mg/dL      Comment: Meter: [id number] : [id number] Ferrara De'Venita NA       POC Glucose Once [[id number]]  (Abnormal) Collected: 12/06/22 1635    Specimen: Blood Updated: 12/06/22 1641     Glucose 179 mg/dL      Comment: Meter: [id number] : [id number] Ferrara De'Venita NA       POC Glucose Once [[id number]]  (Normal) Collected: 12/06/22 2045    Specimen: Blood Updated: 12/06/22 2045     Glucose 79 mg/dL      Comment: Meter: [id number] : [id number] Arnold MCGUIRE             I ordered the above labs and reviewed the results    RADIOLOGY  US Renal Bilateral   Final Result       1.  No shadowing renal stones or hydronephrosis. A focal cortical defect   in the left kidney may correspond to a prominent lobulation on prior CT   versus scarring from prior infection/infarction, which may be new from   prior CT.   2.  Trace free fluid.       This report was finalized on 12/6/2022 1:49 PM by Dr. Gabriella Tolliver M.D.          XR Chest 1 View   Final Result   No evidence for active disease in the chest.       This report was finalized on 12/5/2022 10:09 PM by Dr. Parish Sánchez M.D.               I ordered the above noted radiological studies. Interpreted by radiologist.  Reviewed by me in PACS.       PROCEDURES  Procedures  EKG          EKG time:  2131  Rhythm/Rate: NSR, 99  P waves and WV: nml  QRS, axis: nml, nml   ST and T waves: nml     Interpreted Contemporaneously by me, independently viewed  No previous EKG available for comparison      PROGRESS AND CONSULTS  ED Course as of 12/06/22 2356   Mon Dec 05, 2022   2230 On reevaluation, the patient is resting comfortably and without acute complaint.  I did inform him that his work-up in the ED thus far is unremarkable.  The amount of anasarca and swelling that he is having is quite impressive so I will discuss the case with his primary doctor for further work-up and ultimate disposition. [BM]   2239 Case discussed with Dr. Conn who agrees to admit the patient to the hospital for further management and treatment. [BM]      ED Course User Index  [BM] Sarajbit Escobar MD     The patient was wearing a facemask upon entrance into the room and remained in such throughout their visit.  I was wearing PPE including a facemask, eye protection, as well as gloves at any point entering the room and throughout the visit      MEDICAL DECISION MAKING  Results were reviewed/discussed with the patient and they were also made aware of online access. Pt also made aware that some labs, such as cultures, will not be resulted during ER visit and follow up with PMD is necessary.     MDM  Number of Diagnoses or Management Options  Bilateral lower extremity edema  Edema of upper extremity  Exertional dyspnea  Diagnosis management comments: Differential diagnosis includes but is not limited to acute coronary syndrome, congestive heart failure, anasarca, acute renal failure, liver failure, acute anemia, or electrolyte imbalance.       Amount and/or Complexity of Data Reviewed  Clinical lab tests: reviewed and ordered  Tests in the radiology section of CPT®: reviewed and ordered  Tests in the medicine section of CPT®: reviewed and ordered  Discuss the patient with other providers: yes (Dr. Conn, who will admit the patient to the  [facility])  Independent visualization of images, tracings, or specimens: yes (Chest x-ray reviewed by myself and noted to be without acute findings)           DIAGNOSIS  Final diagnoses:   Bilateral lower extremity edema   Edema of upper extremity   Exertional dyspnea       DISPOSITION  ADMISSION    Discussed treatment plan and reason for admission with pt/family and admitting physician.  Pt/family voiced understanding of the plan for admission for further testing/treatment as needed.         Latest Documented Vital Signs:  As of 23:56 EST  BP- 122/81 HR- 87 Temp- 97.5 °F (36.4 °C) (Oral) O2 sat- 94%         Sarabjit Escobar MD  12/06/22 0402

## 2022-12-06 NOTE — DISCHARGE PLACEMENT REQUEST
"Kathrin Ken (72 y.o. Male)     Date of Birth   1950    Social Security Number       Address   [address] [place] [address]    Home Phone   [phone]    MRN   [id number]       Caodaism   Patient Refused    Marital Status   Unknown                            Admission Date   12/5/22    Admission Type   Emergency    Admitting Provider   Phani Conn MD    Attending Provider   Phani Conn MD    Department, Room/Bed   [facility] OBSERVATION, 128/1       Discharge Date       Discharge Disposition       Discharge Destination                               Attending Provider: Phani Conn MD    Allergies: No Known Allergies    Isolation: None   Infection: None   Code Status: CPR    Ht: 170.2 cm (67\")   Wt: 104 kg (229 lb)    Admission Cmt: None   Principal Problem: Bilateral lower extremity edema [R60.0]                 Active Insurance as of 12/5/2022     Primary Coverage     Payor Plan Insurance Group Employer/Plan Group    [facility] MEDICARE REPLACEMENT [employer] MEDICARE REPLACEMENT      Payor Plan Address Payor Plan Phone Number Payor Plan Fax Number Effective Dates    [facility] [address] [phone]  12/1/2022 - None Entered    [facility] [address]       Subscriber Name Subscriber Birth Date Member ID       KATHRIN KEN 1950 [id number]           Secondary Coverage     Payor Plan Insurance Group Employer/Plan Group    [employer] [place] [employer] [place]      Payor Plan Address Payor Plan Phone Number Payor Plan Fax Number Effective Dates    [address] [place] [phone]  8/1/2016 - None Entered    [facility] [id number]       Subscriber Name Subscriber Birth Date Member ID       KATHRIN KEN 1950 [id number]                 Emergency Contacts      (Rel.) Home Phone Work Phone Mobile Phone    LYN KEN (Son) -- -- [phone]              "

## 2022-12-06 NOTE — DISCHARGE PLACEMENT REQUEST
"Kathrin Ken (72 y.o. Male)     Date of Birth   1950    Social Security Number       Address   [address] [place] [address]    Home Phone   [phone]    MRN   [id number]       [employer]   Patient Refused    Marital Status   Unknown                            Admission Date   12/5/22    Admission Type   Emergency    Admitting Provider   Phani Conn MD    Attending Provider   Phani Conn MD    Department, Room/Bed   [facility] OBSERVATION, 128/1       Discharge Date       Discharge Disposition       Discharge Destination                               Attending Provider: Phani Conn MD    Allergies: No Known Allergies    Isolation: None   Infection: None   Code Status: CPR    Ht: 170.2 cm (67\")   Wt: 104 kg (229 lb)    Admission Cmt: None   Principal Problem: Bilateral lower extremity edema [R60.0]                 Active Insurance as of 12/5/2022     Primary Coverage     Payor Plan Insurance Group Employer/Plan Group    [facility] MEDICARE REPLACEMENT [employer] MEDICARE REPLACEMENT      Payor Plan Address Payor Plan Phone Number Payor Plan Fax Number Effective Dates    [facility] [address] [phone]  12/1/2022 - None Entered    [facility] [address]       Subscriber Name Subscriber Birth Date Member ID       KATHRIN KEN 1950 [id number]           Secondary Coverage     Payor Plan Insurance Group Employer/Plan Group    [employer] [place] [employer] [place]      Payor Plan Address Payor Plan Phone Number Payor Plan Fax Number Effective Dates    [address] [employer] [address] [phone]  8/1/2016 - None Entered    [facility] [id number]       Subscriber Name Subscriber Birth Date Member ID       KATHRIN KEN 1950 [id number]                 Emergency Contacts      (Rel.) Home Phone Work Phone Mobile Phone    LYN KEN (Son) -- -- [phone]              "

## 2022-12-07 VITALS
OXYGEN SATURATION: 94 % | HEIGHT: 67 IN | TEMPERATURE: 97.5 F | WEIGHT: 229 LBS | HEART RATE: 93 BPM | DIASTOLIC BLOOD PRESSURE: 91 MMHG | RESPIRATION RATE: 18 BRPM | BODY MASS INDEX: 35.94 KG/M2 | SYSTOLIC BLOOD PRESSURE: 138 MMHG

## 2022-12-07 LAB
ANA SER QL: NEGATIVE
ANION GAP SERPL CALCULATED.3IONS-SCNC: 10.6 MMOL/L (ref 5–15)
BASOPHILS # BLD AUTO: 0.03 10*3/MM3 (ref 0–0.2)
BASOPHILS NFR BLD AUTO: 0.3 % (ref 0–1.5)
BUN SERPL-MCNC: 16 MG/DL (ref 8–23)
BUN/CREAT SERPL: 17.2 (ref 7–25)
CALCIUM SPEC-SCNC: 9.8 MG/DL (ref 8.6–10.5)
CHLORIDE SERPL-SCNC: 99 MMOL/L (ref 98–107)
CO2 SERPL-SCNC: 26.4 MMOL/L (ref 22–29)
CREAT SERPL-MCNC: 0.93 MG/DL (ref 0.76–1.27)
DEPRECATED RDW RBC AUTO: 41.6 FL (ref 37–54)
DSDNA AB SER-ACNC: <1 IU/ML (ref 0–9)
EGFRCR SERPLBLD CKD-EPI 2021: 87.8 ML/MIN/1.73
ENA RNP AB SER-ACNC: <0.2 AI (ref 0–0.9)
ENA SM AB SER-ACNC: <0.2 AI (ref 0–0.9)
EOSINOPHIL # BLD AUTO: 0.16 10*3/MM3 (ref 0–0.4)
EOSINOPHIL NFR BLD AUTO: 1.8 % (ref 0.3–6.2)
ERYTHROCYTE [DISTWIDTH] IN BLOOD BY AUTOMATED COUNT: 13.5 % (ref 12.3–15.4)
GLUCOSE BLDC GLUCOMTR-MCNC: 104 MG/DL (ref 70–130)
GLUCOSE SERPL-MCNC: 102 MG/DL (ref 65–99)
HCT VFR BLD AUTO: 36.8 % (ref 37.5–51)
HGB BLD-MCNC: 12.4 G/DL (ref 13–17.7)
IMM GRANULOCYTES # BLD AUTO: 0.03 10*3/MM3 (ref 0–0.05)
IMM GRANULOCYTES NFR BLD AUTO: 0.3 % (ref 0–0.5)
LYMPHOCYTES # BLD AUTO: 2.01 10*3/MM3 (ref 0.7–3.1)
LYMPHOCYTES NFR BLD AUTO: 22.3 % (ref 19.6–45.3)
MCH RBC QN AUTO: 28.8 PG (ref 26.6–33)
MCHC RBC AUTO-ENTMCNC: 33.7 G/DL (ref 31.5–35.7)
MCV RBC AUTO: 85.6 FL (ref 79–97)
MONOCYTES # BLD AUTO: 0.83 10*3/MM3 (ref 0.1–0.9)
MONOCYTES NFR BLD AUTO: 9.2 % (ref 5–12)
NEUTROPHILS NFR BLD AUTO: 5.96 10*3/MM3 (ref 1.7–7)
NEUTROPHILS NFR BLD AUTO: 66.1 % (ref 42.7–76)
NRBC BLD AUTO-RTO: 0 /100 WBC (ref 0–0.2)
PLATELET # BLD AUTO: 323 10*3/MM3 (ref 140–450)
PMV BLD AUTO: 9.6 FL (ref 6–12)
POTASSIUM SERPL-SCNC: 3.9 MMOL/L (ref 3.5–5.2)
RBC # BLD AUTO: 4.3 10*6/MM3 (ref 4.14–5.8)
SODIUM SERPL-SCNC: 136 MMOL/L (ref 136–145)
WBC NRBC COR # BLD: 9.02 10*3/MM3 (ref 3.4–10.8)

## 2022-12-07 PROCEDURE — G0378 HOSPITAL OBSERVATION PER HR: HCPCS

## 2022-12-07 PROCEDURE — 25010000002 FUROSEMIDE PER 20 MG: Performed by: INTERNAL MEDICINE

## 2022-12-07 PROCEDURE — 96372 THER/PROPH/DIAG INJ SC/IM: CPT

## 2022-12-07 PROCEDURE — 82962 GLUCOSE BLOOD TEST: CPT

## 2022-12-07 PROCEDURE — 80048 BASIC METABOLIC PNL TOTAL CA: CPT | Performed by: INTERNAL MEDICINE

## 2022-12-07 PROCEDURE — 96376 TX/PRO/DX INJ SAME DRUG ADON: CPT

## 2022-12-07 PROCEDURE — 85025 COMPLETE CBC W/AUTO DIFF WBC: CPT | Performed by: INTERNAL MEDICINE

## 2022-12-07 PROCEDURE — 25010000002 ENOXAPARIN PER 10 MG: Performed by: INTERNAL MEDICINE

## 2022-12-07 RX ORDER — AMLODIPINE BESYLATE 5 MG/1
5 TABLET ORAL DAILY
Qty: 90 TABLET | Refills: 3 | Status: SHIPPED | OUTPATIENT
Start: 2022-12-07

## 2022-12-07 RX ORDER — POTASSIUM CHLORIDE 20 MEQ/1
20 TABLET, EXTENDED RELEASE ORAL 2 TIMES DAILY
Qty: 60 TABLET | Refills: 5 | Status: SHIPPED | OUTPATIENT
Start: 2022-12-07

## 2022-12-07 RX ORDER — VALSARTAN 160 MG/1
160 TABLET ORAL
Qty: 90 TABLET | Refills: 3 | Status: SHIPPED | OUTPATIENT
Start: 2022-12-07

## 2022-12-07 RX ORDER — METOPROLOL SUCCINATE 50 MG/1
50 TABLET, EXTENDED RELEASE ORAL
Qty: 30 TABLET | Refills: 3 | Status: SHIPPED | OUTPATIENT
Start: 2022-12-07

## 2022-12-07 RX ORDER — OMEPRAZOLE 20 MG/1
20 CAPSULE, DELAYED RELEASE ORAL DAILY
Qty: 30 CAPSULE | Refills: 3 | Status: SHIPPED | OUTPATIENT
Start: 2022-12-07

## 2022-12-07 RX ORDER — FUROSEMIDE 40 MG/1
40 TABLET ORAL 2 TIMES DAILY
Qty: 30 TABLET | Refills: 3 | Status: SHIPPED | OUTPATIENT
Start: 2022-12-07

## 2022-12-07 RX ORDER — ROPINIROLE 2 MG/1
2 TABLET, FILM COATED ORAL EVERY 12 HOURS SCHEDULED
Qty: 60 TABLET | Refills: 3 | Status: SHIPPED | OUTPATIENT
Start: 2022-12-07

## 2022-12-07 RX ORDER — MELOXICAM 15 MG/1
15 TABLET ORAL DAILY
Qty: 30 TABLET | Refills: 1 | Status: SHIPPED | OUTPATIENT
Start: 2022-12-07 | End: 2023-01-19 | Stop reason: HOSPADM

## 2022-12-07 RX ADMIN — Medication 10 ML: at 08:07

## 2022-12-07 RX ADMIN — TAMSULOSIN HYDROCHLORIDE 0.4 MG: 0.4 CAPSULE ORAL at 08:05

## 2022-12-07 RX ADMIN — INSULIN GLARGINE-YFGN 20 UNITS: 100 INJECTION, SOLUTION SUBCUTANEOUS at 08:06

## 2022-12-07 RX ADMIN — ENOXAPARIN SODIUM 40 MG: 100 INJECTION SUBCUTANEOUS at 08:05

## 2022-12-07 RX ADMIN — METOPROLOL SUCCINATE 50 MG: 25 TABLET, EXTENDED RELEASE ORAL at 08:05

## 2022-12-07 RX ADMIN — ROPINIROLE 2 MG: 2 TABLET, FILM COATED ORAL at 11:12

## 2022-12-07 RX ADMIN — FUROSEMIDE 40 MG: 10 INJECTION, SOLUTION INTRAMUSCULAR; INTRAVENOUS at 06:09

## 2022-12-07 RX ADMIN — VALSARTAN 160 MG: 160 TABLET, FILM COATED ORAL at 08:05

## 2022-12-07 RX ADMIN — PANTOPRAZOLE SODIUM 40 MG: 40 TABLET, DELAYED RELEASE ORAL at 06:09

## 2022-12-07 RX ADMIN — DULOXETINE HYDROCHLORIDE 60 MG: 60 CAPSULE, DELAYED RELEASE ORAL at 08:05

## 2022-12-07 NOTE — SIGNIFICANT NOTE
PT orders received. Pt awaiting Rheumatology Consult prior to discharge today. He reports significant change in edema x4 extremities. Pt declines need for mobility evaluation and reported up ambulating in hallway indep no lob or unsteadiness. RN confirmed pt is indep with amb. Pt cont to report joint pain. I encouraged him to request Outpatient PT eval if Rheumatologist feels it will be helpful for pain control.

## 2022-12-07 NOTE — PLAN OF CARE
Problem: Adult Inpatient Plan of Care  Goal: Plan of Care Review  Outcome: Ongoing, Progressing  Flowsheets (Taken 12/7/2022 1507)  Progress: improving  Plan of Care Reviewed With: patient  Outcome Evaluation: A&Ox4, VSS, no complaints of pain, patient up ad navya, edema somewhat improved in lower extremities, K+ replaced, normal sinus on monitor, plan of care continues.

## 2022-12-07 NOTE — DISCHARGE SUMMARY
Date of admission: December 5, 2022  Date of discharge: December 7, 2022    Discharge diagnosis:  1.  Anasarca.    2.  Polyarticular synovitis.  Significant proximal disease.  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  Essential hypertension.  4.  Type 2 diabetes mellitus  5.  BPH with bladder outlet obstruction  6.  Hyperlipidemia  7.  Bilateral carpal tunnel related to significant anasarca.  8.  Normocytic anemia which has developed over the past few  9.  Possible left kidney mass    Procedures performed during this admission:  None    Consults during this admission:  None    History of present illness:  This is a very nice 72-year-old gentleman.  He has a history of hypertension, hyperlipidemia, type 2 diabetes mellitus, chronic venous stasis, osteoarthritis and gout.  Patient states about 2 months ago he started having more joint pain.  He noted general joint aching.  At first the joints involved were the fingers, knuckles, wrists, elbows, shoulders, hips and right knee.  He had swelling and warmth of several of these joints.  Subsequently he developed early morning stiffness which lasted for about 2 hours.  If he would sit for more than before short period time he would stiffen up.  He had trouble playing golf.  He had trouble just getting around.  Over the past couple weeks the joint symptoms have continued.  Now he has more significant proximal pain.  The pain is primarily in his shoulders, hips and right knee.  The hands and wrists are not as tender.  Along with the joint pain the patient has developed edema.  At first the swelling was minimal.  Over the past week it has become more marked.  He has gained 20 pounds over the past 7 days.  He has trouble even moving his fingers due to the swelling.  He has developed numbness and tingling of the thumb index and long fingers.  He has trouble getting his socks on.  Been using meloxicam but it does not help     The patient denies any chest pain.  He has no shortness of breath.  He has  no PND orthopnea.  He urinates frequently.  His urine has been of normal color and he has no dysuria.  He has nocturia about 2 or 3 times at night.  He admits to minimal alcohol use.  He has no history of renal or hepatic pathology.  He has had no recent infections.     Evaluation done over the past several weeks include unremarkable CBC and CMP.  His sed rate and CRP have been elevated.  His NATASHA came back 1:160.  It was in a nucleolar pattern CCP and rheumatoid factor been negative.    Physical exam at the time of discharge:  /83   Pulse 72   Temp 98.6 °F (37 °C) (Oral)   Resp 17   SpO2 93% .  Weight on admission was 229.  Nurses state weight at discharge was 216.  Appearance: 72-year-old  gentleman in no acute distress.  HEENT: Normocephalic atraumatic.  Pupils round and equal.  Pharynx slightly dry  Neck: Without lymphadenopathy JVD or thyromegaly.  Lungs: Clear to auscultation percussion  Heart: Regular rate and rhythm.  Abdomen: Protuberant benign.  No rebound, tenderness or guarding  Extremities: With diffuse pitting edema which was 2+ in the arms and legs.  Joint exam: Mild synovitis in the MCPs and wrists.  Tenderness with palpation proximal musculature  Neurologic: Nonfocal.    Significant laboratory studies:  CPK 78  Troponin less than 0.010  Pro   Comprehensive metabolic panel was within normal limits except for glucose 141 and potassium 3.4.  ALT was 18 AST 16.  Albumin 3.7  TSH 2.46  Cholesterol 117, HDL 47, LDL 56, triglycerides 63  CRP 7.6  White count 11.3, hemoglobin 11.4, hematocrit 34.7, MCV 28, platelet count 226  C3 145, C4 38  Negative DNA, Brambila antigen, RNP, SSA and SSB are pending  Sed rate 39  Urinalysis completely within normal limits.    Echocardiogram:•  Left ventricular ejection fraction appears to be 56 - 60%.  •  Left ventricular wall thickness is consistent with mild concentric hypertrophy.  •  Left ventricular diastolic function is consistent with (grade Ia  w/high LAP) impaired relaxation.  •  Normal right ventricular cavity size and systolic function noted.  •  The left atrial cavity is mildly dilated.  •  There is mild pulmonic valve regurgitation present.  •  There is no evidence of pericardial effusion.    Chest x-ray:Cardiomediastinal silhouette is normal. Lungs are clear and  there is no evidence for pulmonary edema or pleural effusion or  infiltrate.    Ultrasound of the kidneys:1.  No shadowing renal stones or hydronephrosis. A focal cortical defect  in the left kidney may correspond to a prominent lobulation on prior CT  versus scarring from prior infection/infarction, which may be new from  prior CT.  2.  Trace free fluid.      Hospital course:  The patient was admitted.  He was placed on 2 g sodium diet.  He was started on IV furosemide.  I decreased his amlodipine and his ropinirole.  With this treatment he had significant diuresis losing about 15 pounds.  While in the hospital we evaluated him for cardiac, renal and hepatic causes of anasarca.  No problems were found in these organ systems.    Patient continues to complain of proximal muscle pain and some stiffness in his hands.  Evaluation for connective tissue has been started.  This is mentioned above.    The patient being discharged on December 7.    Activity ad navya.    The patient is going to get future wrist splints for his hands he would wear these at night and as needed    Medications at discharge:  Allopurinol 300 mg a day  Amlodipine 5 mg a day  Atorvastatin 10 mg a day  Duloxetine 60 mg daily  K-Dur 20 milligrams twice daily  Lasix 40 mg p.o. daily  Meloxicam 15 mg a day  Metformin 1000 mg twice daily  Metoprolol 50 mg daily  Valsartan 160 mg every day  Omeprazole 20 daily  Ropinirole 2 mg twice daily as needed  Tamsulosin 0.4 daily    The patient will eat a diabetic, 2 g sodium diet.    The patient will follow up with me in 2 days.  I will check his fluid status and his electrolytes.  I am  going to order a CT of the abdomen and pelvis as an outpatient to follow-up on the abnormality noted in the left kidney on ultrasound  The patient is going to see rheumatology today.  If indeed the patient does have RSSSPE starting on a moderate dose of corticosteroids would be indicated.

## 2022-12-19 ENCOUNTER — TRANSCRIBE ORDERS (OUTPATIENT)
Dept: ADMINISTRATIVE | Facility: HOSPITAL | Age: 72
End: 2022-12-19

## 2022-12-19 DIAGNOSIS — N28.89 OTHER SPECIFIED DISORDERS OF KIDNEY AND URETER: Primary | ICD-10-CM

## 2023-01-09 ENCOUNTER — HOSPITAL ENCOUNTER (OUTPATIENT)
Dept: CARDIOLOGY | Facility: HOSPITAL | Age: 73
Discharge: HOME | End: 2023-01-09
Payer: MEDICARE

## 2023-01-09 ENCOUNTER — TRANSCRIBE ORDERS (OUTPATIENT)
Dept: ADMINISTRATIVE | Facility: HOSPITAL | Age: 73
End: 2023-01-09
Payer: MEDICARE

## 2023-01-09 ENCOUNTER — LAB (OUTPATIENT)
Dept: LAB | Facility: HOSPITAL | Age: 73
End: 2023-01-09
Payer: MEDICARE

## 2023-01-09 DIAGNOSIS — Z01.818 PRE-OP TESTING: ICD-10-CM

## 2023-01-09 DIAGNOSIS — Z01.818 PRE-OP TESTING: Primary | ICD-10-CM

## 2023-01-09 LAB
ANION GAP SERPL CALCULATED.3IONS-SCNC: 9.8 MMOL/L (ref 5–15)
BUN SERPL-MCNC: 17 MG/DL (ref 8–23)
BUN/CREAT SERPL: 17.2 (ref 7–25)
CALCIUM SPEC-SCNC: 10 MG/DL (ref 8.6–10.5)
CHLORIDE SERPL-SCNC: 106 MMOL/L (ref 98–107)
CO2 SERPL-SCNC: 28.2 MMOL/L (ref 22–29)
CREAT SERPL-MCNC: 0.99 MG/DL (ref 0.76–1.27)
EGFRCR SERPLBLD CKD-EPI 2021: 81.4 ML/MIN/1.73
GLUCOSE SERPL-MCNC: 129 MG/DL (ref 65–99)
POTASSIUM SERPL-SCNC: 4.3 MMOL/L (ref 3.5–5.2)
SODIUM SERPL-SCNC: 144 MMOL/L (ref 136–145)

## 2023-01-09 PROCEDURE — 80048 BASIC METABOLIC PNL TOTAL CA: CPT

## 2023-01-09 PROCEDURE — 36415 COLL VENOUS BLD VENIPUNCTURE: CPT

## 2023-01-09 PROCEDURE — 93005 ELECTROCARDIOGRAM TRACING: CPT | Performed by: PLASTIC SURGERY

## 2023-01-09 PROCEDURE — 93010 ELECTROCARDIOGRAM REPORT: CPT | Performed by: INTERNAL MEDICINE

## 2023-01-10 ENCOUNTER — HOSPITAL ENCOUNTER (OUTPATIENT)
Dept: CT IMAGING | Facility: HOSPITAL | Age: 73
Discharge: HOME | End: 2023-01-10
Admitting: INTERNAL MEDICINE
Payer: MEDICARE

## 2023-01-10 DIAGNOSIS — N28.89 OTHER SPECIFIED DISORDERS OF KIDNEY AND URETER: ICD-10-CM

## 2023-01-10 LAB — QT INTERVAL: 373 MS

## 2023-01-10 PROCEDURE — 74178 CT ABD&PLV WO CNTR FLWD CNTR: CPT

## 2023-01-10 PROCEDURE — 25010000002 IOPAMIDOL 61 % SOLUTION: Performed by: INTERNAL MEDICINE

## 2023-01-10 RX ADMIN — IOPAMIDOL 100 ML: 612 INJECTION, SOLUTION INTRAVENOUS at 15:38

## 2023-01-16 NOTE — SIGNIFICANT NOTE
Education provided the Patient on the following:    - Nothing to Eat or Drink after MN the night before the procedure  -You will need to have someone drive you home after your Surgery and remain with you for 24 hours after the Procedure  - The date of your procedure, your are welcome to have one visitor at bedside or remain within 10-15 minutes of [facility]  -Please wear warm socks when you arrive for your Surgery  -Remove all jewelry and leave any valuables before arriving on the date of your procedure (all will have to be removed before leaving preop)  -You will need to arrive at 0830 on 1-19-23 for your Surgery  -Feel free to contact us at: [phone] with any additional questions/concerns

## 2023-01-19 ENCOUNTER — HOSPITAL ENCOUNTER (OUTPATIENT)
Facility: SURGERY CENTER | Age: 73
Setting detail: HOSPITAL OUTPATIENT SURGERY
Discharge: HOME | End: 2023-01-19
Attending: PLASTIC SURGERY | Admitting: PLASTIC SURGERY
Payer: MEDICARE

## 2023-01-19 ENCOUNTER — ANESTHESIA EVENT (OUTPATIENT)
Dept: SURGERY | Facility: SURGERY CENTER | Age: 73
End: 2023-01-19
Payer: MEDICARE

## 2023-01-19 ENCOUNTER — ANESTHESIA (OUTPATIENT)
Dept: SURGERY | Facility: SURGERY CENTER | Age: 73
End: 2023-01-19
Payer: MEDICARE

## 2023-01-19 VITALS
DIASTOLIC BLOOD PRESSURE: 73 MMHG | WEIGHT: 207.8 LBS | HEART RATE: 83 BPM | TEMPERATURE: 98 F | HEIGHT: 68 IN | OXYGEN SATURATION: 96 % | BODY MASS INDEX: 31.49 KG/M2 | RESPIRATION RATE: 16 BRPM | SYSTOLIC BLOOD PRESSURE: 153 MMHG

## 2023-01-19 DIAGNOSIS — G56.02 CARPAL TUNNEL SYNDROME ON LEFT: ICD-10-CM

## 2023-01-19 LAB — GLUCOSE BLDC GLUCOMTR-MCNC: 137 MG/DL (ref 70–130)

## 2023-01-19 PROCEDURE — 25010000002 PROPOFOL 10 MG/ML EMULSION: Performed by: ANESTHESIOLOGY

## 2023-01-19 PROCEDURE — 64721 CARPAL TUNNEL SURGERY: CPT | Performed by: PLASTIC SURGERY

## 2023-01-19 PROCEDURE — 88312 SPECIAL STAINS GROUP 1: CPT | Performed by: PLASTIC SURGERY

## 2023-01-19 PROCEDURE — 88305 TISSUE EXAM BY PATHOLOGIST: CPT | Performed by: PLASTIC SURGERY

## 2023-01-19 PROCEDURE — 25010000002 CEFAZOLIN IN DEXTROSE 2-4 GM/100ML-% SOLUTION: Performed by: PLASTIC SURGERY

## 2023-01-19 RX ORDER — LISINOPRIL 40 MG/1
40 TABLET ORAL DAILY
COMMUNITY

## 2023-01-19 RX ORDER — NALOXONE HCL 0.4 MG/ML
0.2 VIAL (ML) INJECTION AS NEEDED
Status: DISCONTINUED | OUTPATIENT
Start: 2023-01-19 | End: 2023-01-19 | Stop reason: HOSPADM

## 2023-01-19 RX ORDER — DIPHENHYDRAMINE HCL 25 MG
25 CAPSULE ORAL
Status: DISCONTINUED | OUTPATIENT
Start: 2023-01-19 | End: 2023-01-19 | Stop reason: HOSPADM

## 2023-01-19 RX ORDER — LIDOCAINE HYDROCHLORIDE 20 MG/ML
INJECTION, SOLUTION INFILTRATION; PERINEURAL AS NEEDED
Status: DISCONTINUED | OUTPATIENT
Start: 2023-01-19 | End: 2023-01-19 | Stop reason: SURG

## 2023-01-19 RX ORDER — PROMETHAZINE HYDROCHLORIDE 12.5 MG/1
25 TABLET ORAL ONCE AS NEEDED
Status: DISCONTINUED | OUTPATIENT
Start: 2023-01-19 | End: 2023-01-19 | Stop reason: HOSPADM

## 2023-01-19 RX ORDER — PROPOFOL 10 MG/ML
VIAL (ML) INTRAVENOUS AS NEEDED
Status: DISCONTINUED | OUTPATIENT
Start: 2023-01-19 | End: 2023-01-19 | Stop reason: SURG

## 2023-01-19 RX ORDER — LIDOCAINE HYDROCHLORIDE AND EPINEPHRINE 10; 10 MG/ML; UG/ML
INJECTION, SOLUTION INFILTRATION; PERINEURAL AS NEEDED
Status: DISCONTINUED | OUTPATIENT
Start: 2023-01-19 | End: 2023-01-19 | Stop reason: HOSPADM

## 2023-01-19 RX ORDER — FENTANYL CITRATE 50 UG/ML
25 INJECTION, SOLUTION INTRAMUSCULAR; INTRAVENOUS
Status: DISCONTINUED | OUTPATIENT
Start: 2023-01-19 | End: 2023-01-19 | Stop reason: HOSPADM

## 2023-01-19 RX ORDER — ONDANSETRON 2 MG/ML
4 INJECTION INTRAMUSCULAR; INTRAVENOUS ONCE AS NEEDED
Status: DISCONTINUED | OUTPATIENT
Start: 2023-01-19 | End: 2023-01-19 | Stop reason: HOSPADM

## 2023-01-19 RX ORDER — SODIUM CHLORIDE, SODIUM LACTATE, POTASSIUM CHLORIDE, CALCIUM CHLORIDE 600; 310; 30; 20 MG/100ML; MG/100ML; MG/100ML; MG/100ML
9 INJECTION, SOLUTION INTRAVENOUS CONTINUOUS
Status: DISCONTINUED | OUTPATIENT
Start: 2023-01-19 | End: 2023-01-19 | Stop reason: HOSPADM

## 2023-01-19 RX ORDER — DIPHENHYDRAMINE HYDROCHLORIDE 50 MG/ML
12.5 INJECTION INTRAMUSCULAR; INTRAVENOUS
Status: DISCONTINUED | OUTPATIENT
Start: 2023-01-19 | End: 2023-01-19 | Stop reason: HOSPADM

## 2023-01-19 RX ORDER — FLUMAZENIL 0.1 MG/ML
0.2 INJECTION INTRAVENOUS AS NEEDED
Status: DISCONTINUED | OUTPATIENT
Start: 2023-01-19 | End: 2023-01-19 | Stop reason: HOSPADM

## 2023-01-19 RX ORDER — PROMETHAZINE HYDROCHLORIDE 25 MG/1
25 SUPPOSITORY RECTAL ONCE AS NEEDED
Status: DISCONTINUED | OUTPATIENT
Start: 2023-01-19 | End: 2023-01-19 | Stop reason: HOSPADM

## 2023-01-19 RX ORDER — CEFAZOLIN SODIUM 2 G/100ML
2 INJECTION, SOLUTION INTRAVENOUS ONCE
Status: COMPLETED | OUTPATIENT
Start: 2023-01-19 | End: 2023-01-19

## 2023-01-19 RX ORDER — SODIUM CHLORIDE 0.9 % (FLUSH) 0.9 %
10 SYRINGE (ML) INJECTION AS NEEDED
Status: DISCONTINUED | OUTPATIENT
Start: 2023-01-19 | End: 2023-01-19 | Stop reason: HOSPADM

## 2023-01-19 RX ORDER — SODIUM CHLORIDE, SODIUM LACTATE, POTASSIUM CHLORIDE, CALCIUM CHLORIDE 600; 310; 30; 20 MG/100ML; MG/100ML; MG/100ML; MG/100ML
1000 INJECTION, SOLUTION INTRAVENOUS CONTINUOUS
Status: DISCONTINUED | OUTPATIENT
Start: 2023-01-19 | End: 2023-01-19 | Stop reason: HOSPADM

## 2023-01-19 RX ORDER — FAMOTIDINE 10 MG/ML
20 INJECTION, SOLUTION INTRAVENOUS ONCE
Status: COMPLETED | OUTPATIENT
Start: 2023-01-19 | End: 2023-01-19

## 2023-01-19 RX ORDER — LIDOCAINE HYDROCHLORIDE 10 MG/ML
0.5 INJECTION, SOLUTION INFILTRATION; PERINEURAL ONCE AS NEEDED
Status: DISCONTINUED | OUTPATIENT
Start: 2023-01-19 | End: 2023-01-19 | Stop reason: HOSPADM

## 2023-01-19 RX ADMIN — PROPOFOL 100 MCG/KG/MIN: 10 INJECTION, EMULSION INTRAVENOUS at 08:26

## 2023-01-19 RX ADMIN — SODIUM CHLORIDE, POTASSIUM CHLORIDE, SODIUM LACTATE AND CALCIUM CHLORIDE 1000 ML: 600; 310; 30; 20 INJECTION, SOLUTION INTRAVENOUS at 08:11

## 2023-01-19 RX ADMIN — FAMOTIDINE 20 MG: 10 INJECTION, SOLUTION INTRAVENOUS at 08:16

## 2023-01-19 RX ADMIN — PROPOFOL 80 MG: 10 INJECTION, EMULSION INTRAVENOUS at 08:26

## 2023-01-19 RX ADMIN — LIDOCAINE HYDROCHLORIDE 60 MG: 20 INJECTION, SOLUTION INFILTRATION; PERINEURAL at 08:26

## 2023-01-19 RX ADMIN — CEFAZOLIN SODIUM 2 G: 2 INJECTION, SOLUTION INTRAVENOUS at 08:34

## 2023-01-19 NOTE — BRIEF OP NOTE
CARPAL TUNNEL RELEASE  Progress Note    Mark Ken  1/19/2023    Pre-op Diagnosis:   Carpal tunnel syndrome on left [G56.02]       Post-Op Diagnosis Codes:     * Carpal tunnel syndrome on left [G56.02]    Procedure/CPT® Codes:        Procedure(s):  LEFT CARPAL TUNNEL RELEASE AND SYNOVIAL BIOPSY        Surgeon(s):  Mikel Dupont MD    Anesthesia: Monitored Anesthesia Care with [facility]    Staff:   Circulator: Jane Rodríguez RN  Scrub Person: Cherie Nicolas         Estimated Blood Loss: 10mL    Urine Voided: * No values recorded between 1/19/2023  8:21 AM and 1/19/2023  8:56 AM *    Specimens:                Specimens     ID Source Type Tests Collected By Collected At Frozen?    A Hand, Left Tissue · TISSUE PATHOLOGY EXAM   Mikel Dupont MD 1/19/23 0836 No    Description: Left synovial biopsy, please examine for Amyloid                 Drains: none            Complications: none          Mikel Dupont MD     Date: 1/19/2023  Time: 08:58 EST

## 2023-01-19 NOTE — ANESTHESIA PREPROCEDURE EVALUATION
" Anesthesia Evaluation     Patient summary reviewed and Nursing notes reviewed   NPO Solid Status: > 8 hours  NPO Liquid Status: > 2 hours           Airway   Mallampati: II  TM distance: >3 FB  Neck ROM: full  No difficulty expected  Dental      Pulmonary    Cardiovascular     ECG reviewed  Patient on routine beta blocker and Beta blocker given within 24 hours of surgery    (+) CHF Diastolic >=55%, hyperlipidemia,       Neuro/Psych  (+) numbness (CTS),    GI/Hepatic/Renal/Endo    (+) obesity,   diabetes mellitus,     Musculoskeletal     Abdominal    Substance History   (+) drug use (mj)     OB/GYN          Other                        Anesthesia Plan    ASA 3     MAC     (I have reviewed the patient's history and chart with the patient, including all pertinent laboratory results and imaging. I have explained the risks of anesthesia including but not limited to dental damage, corneal abrasion, nerve injury, MI, stroke, aspiration, and death. Patient has agreed to proceed.     Ht 172.7 cm (68\")   Wt 95.3 kg (210 lb)   BMI 31.93 kg/m²   )  intravenous induction     Anesthetic plan, risks, benefits, and alternatives have been provided, discussed and informed consent has been obtained with: patient.        CODE STATUS:       "

## 2023-01-19 NOTE — ANESTHESIA POSTPROCEDURE EVALUATION
Patient: Mark Ken    Procedure Summary     Date: 01/19/23 Room / Location: [facility] OR [facility]    Anesthesia Start: 0824 Anesthesia Stop: 0902    Procedure: LEFT CARPAL TUNNEL RELEASE AND SYNOVIAL BIOPSY (Left: Wrist) Diagnosis:       Carpal tunnel syndrome on left      (Carpal tunnel syndrome on left [G56.02])    Surgeons: Mikel Dupont MD Provider: Kaitlyn Ballard MD    Anesthesia Type: MAC ASA Status: 3          Anesthesia Type: MAC    Vitals  Vitals Value Taken Time   /75 01/19/23 0901   Temp 36.7 °C (98 °F) 01/19/23 0901   Pulse 83 01/19/23 0901   Resp 16 01/19/23 0901   SpO2 97 % 01/19/23 0901           Post Anesthesia Care and Evaluation    Patient location during evaluation: bedside  Patient participation: complete - patient participated  Level of consciousness: awake and alert  Pain management: adequate    Airway patency: patent  Anesthetic complications: No anesthetic complications  PONV Status: controlled  Cardiovascular status: acceptable  Respiratory status: acceptable  Hydration status: acceptable

## 2023-01-20 LAB
LAB AP CASE REPORT: NORMAL
LAB AP SPECIAL STAINS: NORMAL
PATH REPORT.FINAL DX SPEC: NORMAL
PATH REPORT.GROSS SPEC: NORMAL

## 2023-02-23 ENCOUNTER — LAB (OUTPATIENT)
Dept: LAB | Facility: HOSPITAL | Age: 73
End: 2023-02-23
Payer: MEDICARE

## 2023-02-23 ENCOUNTER — TRANSCRIBE ORDERS (OUTPATIENT)
Dept: ADMINISTRATIVE | Facility: HOSPITAL | Age: 73
End: 2023-02-23
Payer: MEDICARE

## 2023-02-23 DIAGNOSIS — Z01.818 PRE-OP TESTING: Primary | ICD-10-CM

## 2023-02-23 DIAGNOSIS — Z01.818 PRE-OP TESTING: ICD-10-CM

## 2023-02-23 LAB
ANION GAP SERPL CALCULATED.3IONS-SCNC: 9 MMOL/L (ref 5–15)
BUN SERPL-MCNC: 15 MG/DL (ref 8–23)
BUN/CREAT SERPL: 13.6 (ref 7–25)
CALCIUM SPEC-SCNC: 9.4 MG/DL (ref 8.6–10.5)
CHLORIDE SERPL-SCNC: 101 MMOL/L (ref 98–107)
CO2 SERPL-SCNC: 31 MMOL/L (ref 22–29)
CREAT SERPL-MCNC: 1.1 MG/DL (ref 0.76–1.27)
EGFRCR SERPLBLD CKD-EPI 2021: 71.8 ML/MIN/1.73
GLUCOSE SERPL-MCNC: 135 MG/DL (ref 65–99)
POTASSIUM SERPL-SCNC: 4.2 MMOL/L (ref 3.5–5.2)
SODIUM SERPL-SCNC: 141 MMOL/L (ref 136–145)

## 2023-02-23 PROCEDURE — 36415 COLL VENOUS BLD VENIPUNCTURE: CPT

## 2023-02-23 PROCEDURE — 80048 BASIC METABOLIC PNL TOTAL CA: CPT

## 2023-03-02 ENCOUNTER — ANESTHESIA EVENT (OUTPATIENT)
Dept: SURGERY | Facility: SURGERY CENTER | Age: 73
End: 2023-03-02
Payer: MEDICARE

## 2023-03-02 ENCOUNTER — ANESTHESIA (OUTPATIENT)
Dept: SURGERY | Facility: SURGERY CENTER | Age: 73
End: 2023-03-02
Payer: MEDICARE

## 2023-03-02 ENCOUNTER — HOSPITAL ENCOUNTER (OUTPATIENT)
Facility: SURGERY CENTER | Age: 73
Setting detail: HOSPITAL OUTPATIENT SURGERY
Discharge: HOME | End: 2023-03-02
Attending: PLASTIC SURGERY | Admitting: PLASTIC SURGERY
Payer: MEDICARE

## 2023-03-02 VITALS
BODY MASS INDEX: 31.52 KG/M2 | HEART RATE: 82 BPM | SYSTOLIC BLOOD PRESSURE: 127 MMHG | DIASTOLIC BLOOD PRESSURE: 62 MMHG | HEIGHT: 68 IN | OXYGEN SATURATION: 96 % | WEIGHT: 208 LBS | RESPIRATION RATE: 16 BRPM | TEMPERATURE: 98 F

## 2023-03-02 LAB — GLUCOSE BLDC GLUCOMTR-MCNC: 127 MG/DL (ref 70–130)

## 2023-03-02 PROCEDURE — 25010000002 KETOROLAC TROMETHAMINE PER 15 MG: Performed by: STUDENT IN AN ORGANIZED HEALTH CARE EDUCATION/TRAINING PROGRAM

## 2023-03-02 PROCEDURE — 25010000002 FENTANYL CITRATE (PF) 100 MCG/2ML SOLUTION: Performed by: STUDENT IN AN ORGANIZED HEALTH CARE EDUCATION/TRAINING PROGRAM

## 2023-03-02 PROCEDURE — 0 CEFAZOLIN SODIUM-DEXTROSE 2-3 GM-%(50ML) RECONSTITUTED SOLUTION: Performed by: STUDENT IN AN ORGANIZED HEALTH CARE EDUCATION/TRAINING PROGRAM

## 2023-03-02 PROCEDURE — 25010000002 DEXAMETHASONE PER 1 MG: Performed by: STUDENT IN AN ORGANIZED HEALTH CARE EDUCATION/TRAINING PROGRAM

## 2023-03-02 PROCEDURE — 25010000002 PROPOFOL 10 MG/ML EMULSION: Performed by: STUDENT IN AN ORGANIZED HEALTH CARE EDUCATION/TRAINING PROGRAM

## 2023-03-02 PROCEDURE — 64721 CARPAL TUNNEL SURGERY: CPT | Performed by: PLASTIC SURGERY

## 2023-03-02 PROCEDURE — 25010000002 ONDANSETRON PER 1 MG: Performed by: STUDENT IN AN ORGANIZED HEALTH CARE EDUCATION/TRAINING PROGRAM

## 2023-03-02 RX ORDER — SODIUM CHLORIDE 0.9 % (FLUSH) 0.9 %
10 SYRINGE (ML) INJECTION AS NEEDED
Status: DISCONTINUED | OUTPATIENT
Start: 2023-03-02 | End: 2023-03-02 | Stop reason: HOSPADM

## 2023-03-02 RX ORDER — FAMOTIDINE 10 MG/ML
20 INJECTION, SOLUTION INTRAVENOUS ONCE
Status: COMPLETED | OUTPATIENT
Start: 2023-03-02 | End: 2023-03-02

## 2023-03-02 RX ORDER — FLUMAZENIL 0.1 MG/ML
0.2 INJECTION INTRAVENOUS AS NEEDED
Status: DISCONTINUED | OUTPATIENT
Start: 2023-03-02 | End: 2023-03-02 | Stop reason: HOSPADM

## 2023-03-02 RX ORDER — ONDANSETRON 2 MG/ML
4 INJECTION INTRAMUSCULAR; INTRAVENOUS ONCE AS NEEDED
Status: DISCONTINUED | OUTPATIENT
Start: 2023-03-02 | End: 2023-03-02 | Stop reason: HOSPADM

## 2023-03-02 RX ORDER — PROMETHAZINE HYDROCHLORIDE 25 MG/1
25 SUPPOSITORY RECTAL ONCE AS NEEDED
Status: DISCONTINUED | OUTPATIENT
Start: 2023-03-02 | End: 2023-03-02 | Stop reason: HOSPADM

## 2023-03-02 RX ORDER — DEXAMETHASONE SODIUM PHOSPHATE 4 MG/ML
INJECTION, SOLUTION INTRA-ARTICULAR; INTRALESIONAL; INTRAMUSCULAR; INTRAVENOUS; SOFT TISSUE AS NEEDED
Status: DISCONTINUED | OUTPATIENT
Start: 2023-03-02 | End: 2023-03-02 | Stop reason: SURG

## 2023-03-02 RX ORDER — LIDOCAINE HYDROCHLORIDE AND EPINEPHRINE 10; 10 MG/ML; UG/ML
INJECTION, SOLUTION INFILTRATION; PERINEURAL AS NEEDED
Status: DISCONTINUED | OUTPATIENT
Start: 2023-03-02 | End: 2023-03-02 | Stop reason: HOSPADM

## 2023-03-02 RX ORDER — LIDOCAINE HYDROCHLORIDE 10 MG/ML
0.5 INJECTION, SOLUTION INFILTRATION; PERINEURAL ONCE AS NEEDED
Status: DISCONTINUED | OUTPATIENT
Start: 2023-03-02 | End: 2023-03-02 | Stop reason: HOSPADM

## 2023-03-02 RX ORDER — KETOROLAC TROMETHAMINE 30 MG/ML
INJECTION, SOLUTION INTRAMUSCULAR; INTRAVENOUS AS NEEDED
Status: DISCONTINUED | OUTPATIENT
Start: 2023-03-02 | End: 2023-03-02 | Stop reason: SURG

## 2023-03-02 RX ORDER — FENTANYL CITRATE 50 UG/ML
INJECTION, SOLUTION INTRAMUSCULAR; INTRAVENOUS AS NEEDED
Status: DISCONTINUED | OUTPATIENT
Start: 2023-03-02 | End: 2023-03-02 | Stop reason: SURG

## 2023-03-02 RX ORDER — SODIUM CHLORIDE, SODIUM LACTATE, POTASSIUM CHLORIDE, CALCIUM CHLORIDE 600; 310; 30; 20 MG/100ML; MG/100ML; MG/100ML; MG/100ML
9 INJECTION, SOLUTION INTRAVENOUS CONTINUOUS
Status: DISCONTINUED | OUTPATIENT
Start: 2023-03-02 | End: 2023-03-02 | Stop reason: HOSPADM

## 2023-03-02 RX ORDER — ONDANSETRON 2 MG/ML
INJECTION INTRAMUSCULAR; INTRAVENOUS AS NEEDED
Status: DISCONTINUED | OUTPATIENT
Start: 2023-03-02 | End: 2023-03-02 | Stop reason: SURG

## 2023-03-02 RX ORDER — PROPOFOL 10 MG/ML
VIAL (ML) INTRAVENOUS AS NEEDED
Status: DISCONTINUED | OUTPATIENT
Start: 2023-03-02 | End: 2023-03-02 | Stop reason: SURG

## 2023-03-02 RX ORDER — SODIUM CHLORIDE, SODIUM LACTATE, POTASSIUM CHLORIDE, CALCIUM CHLORIDE 600; 310; 30; 20 MG/100ML; MG/100ML; MG/100ML; MG/100ML
1000 INJECTION, SOLUTION INTRAVENOUS CONTINUOUS
Status: DISCONTINUED | OUTPATIENT
Start: 2023-03-02 | End: 2023-03-02 | Stop reason: HOSPADM

## 2023-03-02 RX ORDER — PROMETHAZINE HYDROCHLORIDE 12.5 MG/1
25 TABLET ORAL ONCE AS NEEDED
Status: DISCONTINUED | OUTPATIENT
Start: 2023-03-02 | End: 2023-03-02 | Stop reason: HOSPADM

## 2023-03-02 RX ORDER — DROPERIDOL 2.5 MG/ML
0.62 INJECTION, SOLUTION INTRAMUSCULAR; INTRAVENOUS
Status: DISCONTINUED | OUTPATIENT
Start: 2023-03-02 | End: 2023-03-02 | Stop reason: HOSPADM

## 2023-03-02 RX ORDER — CEFAZOLIN SODIUM 2 G/100ML
2 INJECTION, SOLUTION INTRAVENOUS EVERY 8 HOURS
Status: DISCONTINUED | OUTPATIENT
Start: 2023-03-02 | End: 2023-03-02 | Stop reason: HOSPADM

## 2023-03-02 RX ORDER — FENTANYL CITRATE 50 UG/ML
50 INJECTION, SOLUTION INTRAMUSCULAR; INTRAVENOUS
Status: DISCONTINUED | OUTPATIENT
Start: 2023-03-02 | End: 2023-03-02 | Stop reason: HOSPADM

## 2023-03-02 RX ORDER — ACETAMINOPHEN 500 MG
1000 TABLET ORAL ONCE
Status: COMPLETED | OUTPATIENT
Start: 2023-03-02 | End: 2023-03-02

## 2023-03-02 RX ORDER — DIPHENHYDRAMINE HYDROCHLORIDE 50 MG/ML
12.5 INJECTION INTRAMUSCULAR; INTRAVENOUS
Status: DISCONTINUED | OUTPATIENT
Start: 2023-03-02 | End: 2023-03-02 | Stop reason: HOSPADM

## 2023-03-02 RX ORDER — LIDOCAINE HYDROCHLORIDE 20 MG/ML
INJECTION, SOLUTION INFILTRATION; PERINEURAL AS NEEDED
Status: DISCONTINUED | OUTPATIENT
Start: 2023-03-02 | End: 2023-03-02 | Stop reason: SURG

## 2023-03-02 RX ORDER — NALOXONE HCL 0.4 MG/ML
0.2 VIAL (ML) INJECTION AS NEEDED
Status: DISCONTINUED | OUTPATIENT
Start: 2023-03-02 | End: 2023-03-02 | Stop reason: HOSPADM

## 2023-03-02 RX ORDER — CEFAZOLIN SODIUM 2 G/50ML
SOLUTION INTRAVENOUS AS NEEDED
Status: DISCONTINUED | OUTPATIENT
Start: 2023-03-02 | End: 2023-03-02 | Stop reason: SURG

## 2023-03-02 RX ADMIN — FENTANYL CITRATE 25 MCG: 50 INJECTION INTRAMUSCULAR; INTRAVENOUS at 07:28

## 2023-03-02 RX ADMIN — FENTANYL CITRATE 25 MCG: 50 INJECTION INTRAMUSCULAR; INTRAVENOUS at 07:25

## 2023-03-02 RX ADMIN — LIDOCAINE HYDROCHLORIDE 40 MG: 20 INJECTION, SOLUTION INFILTRATION; PERINEURAL at 07:22

## 2023-03-02 RX ADMIN — PROPOFOL 140 MCG/KG/MIN: 10 INJECTION, EMULSION INTRAVENOUS at 07:23

## 2023-03-02 RX ADMIN — CEFAZOLIN SODIUM 2 G: 2 SOLUTION INTRAVENOUS at 07:22

## 2023-03-02 RX ADMIN — PROPOFOL 60 MG: 10 INJECTION, EMULSION INTRAVENOUS at 07:22

## 2023-03-02 RX ADMIN — KETOROLAC TROMETHAMINE 30 MG: 30 INJECTION, SOLUTION INTRAMUSCULAR at 07:28

## 2023-03-02 RX ADMIN — FAMOTIDINE 20 MG: 10 INJECTION, SOLUTION INTRAVENOUS at 07:10

## 2023-03-02 RX ADMIN — SODIUM CHLORIDE, POTASSIUM CHLORIDE, SODIUM LACTATE AND CALCIUM CHLORIDE 1000 ML: 600; 310; 30; 20 INJECTION, SOLUTION INTRAVENOUS at 06:44

## 2023-03-02 RX ADMIN — ONDANSETRON 4 MG: 2 INJECTION INTRAMUSCULAR; INTRAVENOUS at 07:28

## 2023-03-02 RX ADMIN — ACETAMINOPHEN TAB 500 MG 1000 MG: 500 TAB at 07:09

## 2023-03-02 RX ADMIN — DEXAMETHASONE SODIUM PHOSPHATE 8 MG: 4 INJECTION, SOLUTION INTRAMUSCULAR; INTRAVENOUS at 07:28

## 2023-03-02 NOTE — ANESTHESIA PREPROCEDURE EVALUATION
Anesthesia Evaluation     Patient summary reviewed and Nursing notes reviewed   no history of anesthetic complications:  NPO Solid Status: > 8 hours  NPO Liquid Status: > 2 hours           Airway   Mallampati: II  TM distance: >3 FB  Neck ROM: full  Dental      Pulmonary    Cardiovascular     ECG reviewed    (+) hypertension, hyperlipidemia,     ROS comment: Echo reviewed    Neuro/Psych  GI/Hepatic/Renal/Endo    (+) obesity,   diabetes mellitus,     Musculoskeletal     Abdominal    Substance History      OB/GYN          Other                        Anesthesia Plan    ASA 3     MAC     intravenous induction     Anesthetic plan, risks, benefits, and alternatives have been provided, discussed and informed consent has been obtained with: patient.        CODE STATUS:

## 2023-03-02 NOTE — ANESTHESIA POSTPROCEDURE EVALUATION
Patient: Mark Ken    Procedure Summary     Date: 03/02/23 Room / Location: [facility]    Anesthesia Start: 0716 Anesthesia Stop: 0750    Procedure: RIGHT CARPAL TUNNEL RELEASE (Right: Wrist) Diagnosis:       Carpal tunnel syndrome on right      (Carpal tunnel syndrome on right [G56.01])    Surgeons: Mikel Dupont MD Provider: Dino Child MD    Anesthesia Type: MAC ASA Status: 3          Anesthesia Type: MAC    Vitals  Vitals Value Taken Time   /62 03/02/23 0752   Temp 36.7 °C (98 °F) 03/02/23 0747   Pulse 82 03/02/23 0752   Resp 16 03/02/23 0752   SpO2 96 % 03/02/23 0752           Post Anesthesia Care and Evaluation    Patient location during evaluation: bedside  Patient participation: complete - patient participated  Level of consciousness: awake and alert  Pain management: adequate    Airway patency: patent  Anesthetic complications: No anesthetic complications  PONV Status: controlled  Cardiovascular status: blood pressure returned to baseline and acceptable  Respiratory status: acceptable  Hydration status: acceptable

## 2023-03-02 NOTE — BRIEF OP NOTE
CARPAL TUNNEL RELEASE  Progress Note    Mark Ken  3/2/2023    Pre-op Diagnosis:   Carpal tunnel syndrome on right [G56.01]       Post-Op Diagnosis Codes:     * Carpal tunnel syndrome on right [G56.01]    Procedure/CPT® Codes:        Procedure(s):  RIGHT CARPAL TUNNEL RELEASE        Surgeon(s):  Mikel Dupont MD    Anesthesia: Monitored Anesthesia Care with [facility]    Staff:   Circulator: Jane Rodríguez RN  Scrub Person: Cherie Nicolas         Estimated Blood Loss: 5 mL    Urine Voided: * No values recorded between 3/2/2023  7:12 AM and 3/2/2023  7:43 AM *    Specimens:                none          Drains:none            Complications: none          Mikel Dupont MD     Date: 3/2/2023  Time: 07:44 EST

## 2023-05-18 ENCOUNTER — OFFICE VISIT (OUTPATIENT)
Dept: ONCOLOGY | Facility: CLINIC | Age: 73
End: 2023-05-18
Payer: MEDICARE

## 2023-05-18 DIAGNOSIS — C16.5 MALIGNANT NEOPLASM OF LESSER CURVATURE OF STOMACH, UNSPECIFIED: ICD-10-CM

## 2023-05-18 DIAGNOSIS — C16.9 MALIGNANT NEOPLASM OF STOMACH, UNSPECIFIED LOCATION: Primary | ICD-10-CM

## 2023-05-18 PROCEDURE — 99443 PR PHYS/QHP TELEPHONE EVALUATION 21-30 MIN: CPT | Performed by: INTERNAL MEDICINE

## 2023-05-18 NOTE — PROGRESS NOTES
Subjective     REASON FOR CONSULTATION: Gastric cancer  Provide an opinion on any further workup or treatment                             REQUESTING PHYSICIAN:Phani Conn MD, Kavon Reilly MD    RECORDS OBTAINED:  Records of the patients history including those obtained from the referring provider were reviewed and summarized in detail.    HISTORY OF PRESENT ILLNESS:  The patient is a 72 y.o. year old male who is here for an opinion about the above issue.    History of Present Illness     The patient is a 72-year-old male followed by primary care with a history of hypertension, hyperlipidemia, type 2 diabetes, chronic venous stasis, osteoarthritis and gout.  He has been admitted 12/5- 7/2022 with joint pain that was polyarticular with associated edema.  This became quite marked and increasing 20 pounds over 7 days.  His initial studies included hemoglobin 11.4 hematocrit 34.7, sed rate of 39, negative anti-double-stranded DNA, Brambila antigen, RNP, SSA and SSB, normal liver function tests, normal echocardiogram, normal ultrasound of kidneys.  The patient was placed on a 2 g sodium diet, starting of IV diuretics with adjustment of his amlodipine and ropinirole and he did lose approximately 15 pounds.  Studies in around this time included 8/29/2022 with limited abdominal ultrasound showed a small amount of echogenic sludge in the gallbladder, echogenicity liver indicative hepatic steatosis without mass and HIDA scan which was essentially normal.  Renal ultrasound suggested a potential focal cortical defect in the left kidney and follow-up CT abdomen pelvis 1/10/2023 demonstrated no renal calculi, hydronephrosis or suspicious renal mass, diffuse hepatic steatosis and colonic diverticulosis.    The patient later in March required right carpal tunnel release and had been seen by GI (Dr. Reilly) 3/9/2023 undergoing colonoscopy for surveillance with a history of colonic polyps (adenomatous).  After the procedure he  did mention upper GI symptoms with nausea left upper abdominal pain and reflux and had previously had upper abdominal symptoms which resolved thought to be gastritis.  His recent radiologic studies did not explain his symptoms and plans were made for EGD through Dr. Reilyl.    EGD was scheduled on 4/27/2023 demonstrating normal mucosa in the upper third of the esophagus the middle third and the lower third, Z-line regular at 40 cm, scattered mild mucosal changes characterized by granularity at the GE junction with biopsy, diffuse mucosal changes with atrophy in the gastric body with biopsies taken and localized severe mucosal change with ulceration found on the posterior wall of the gastric body.  The cardia and gastric fundus were normal retroflexion and no mucosal was found in the entire duodenum with additional biopsies taken.    Pathology results included random duodenal biopsies negative, random gastric biopsy with intestinal metaplasia, negative H. pylori and random lower esophageal biopsy with reactive changes only but gastric ulcer biopsy was consistent with moderately to poorly differentiated adenocarcinoma arising in a background of intestinal metaplasia with high-grade dysplasia, gastric ulcer with necroinflammatory exudate present and subsequent immunostain for HER2 was negative.    The patient's subsequent imaging included CT chest abdomen pelvis showing circumferential thickening of the lesser curvature of the stomach with a central area of ulceration, prominent gastric hepatic ligament and lymph nodes concerning for metastatic disease but no evidence of distant metastatic disease.    The patient is now referred to oncology.  It is not clear that he has been seen by surgery as of yet.    The patient was to be seen in [facility] office today but was unable to make the appointment as a result of [place] many miles and leading to him having to return to home.  He is  contacted by telephone (agrees with the call) recognizing that he truly needs an assessment by PET/CT and possibly a surgical assessment now.  Fortunately his symptoms are relatively well controlled at present.  Past Medical History:   Diagnosis Date   • BPH (benign prostatic hyperplasia)    • Carpal tunnel syndrome    • Depression    • Diabetes mellitus    • Gout    • Hyperlipidemia    • Hypertension    • Neuropathy     Feet   • Restless legs         Past Surgical History:   Procedure Laterality Date   • CARPAL TUNNEL RELEASE Left 01/19/2023    Procedure: LEFT CARPAL TUNNEL RELEASE AND SYNOVIAL BIOPSY;  Surgeon: Mikel Dupont MD;  Location: [facility];  Service: Hand;  Laterality: Left;   • CARPAL TUNNEL RELEASE Right 03/02/2023    Procedure: RIGHT CARPAL TUNNEL RELEASE;  Surgeon: Mikel Dupont MD;  Location: [facility];  Service: Hand;  Laterality: Right;   • COLONOSCOPY     • JOINT REPLACEMENT     • KNEE ARTHROSCOPY Bilateral    • TOTAL KNEE ARTHROPLASTY Right 02/21/2018        Current Outpatient Medications on File Prior to Visit   Medication Sig Dispense Refill   • allopurinol (ZYLOPRIM) 300 MG tablet Take 1 tablet by mouth Daily.     • amLODIPine (NORVASC) 5 MG tablet Take 1 tablet by mouth Daily. 90 tablet 3   • CBD (cannabidiol) oral oil Take  by mouth. gummies     • DULoxetine (CYMBALTA) 60 MG capsule Take 60 mg by mouth Daily.     • furosemide (Lasix) 40 MG tablet Take 1 tablet by mouth 2 (Two) Times a Day. 30 tablet 3   • lisinopril (PRINIVIL,ZESTRIL) 40 MG tablet Take 1 tablet by mouth Daily.     • metFORMIN (GLUCOPHAGE) 1000 MG tablet Take 1 tablet by mouth 2 (Two) Times a Day With Meals.     • metoprolol succinate XL (TOPROL-XL) 50 MG 24 hr tablet Take 1 tablet by mouth Daily. 30 tablet 3   • omeprazole (priLOSEC) 20 MG capsule Take 1 capsule by mouth Daily. 30 capsule 3   • potassium chloride (K-DUR,KLOR-CON) 20 MEQ CR tablet Take 1 tablet by mouth 2 (Two) Times a Day. 60  tablet 5   • rOPINIRole (REQUIP) 2 MG tablet Take 1 tablet by mouth Every 12 (Twelve) Hours. 60 tablet 3   • tamsulosin (FLOMAX) 0.4 MG capsule 24 hr capsule Take 1 capsule by mouth Daily.     • valsartan (DIOVAN) 160 MG tablet Take 1 tablet by mouth Daily. 90 tablet 3     No current facility-administered medications on file prior to visit.        ALLERGIES:  No Known Allergies     Social History     Socioeconomic History   • Marital status:    Tobacco Use   • Smoking status: Never   Vaping Use   • Vaping Use: Never used   Substance and Sexual Activity   • Alcohol use: Yes     Comment: social, once a month   • Drug use: Yes     Types: Marijuana     Comment: thc gummies for sleep   • Sexual activity: Defer        No family history on file.     Review of Systems   Constitutional: Positive for activity change and fatigue.   HENT: Negative.    Eyes: Negative.    Respiratory: Negative.    Cardiovascular: Negative.    Gastrointestinal: Positive for abdominal pain.   Genitourinary: Negative.    Musculoskeletal: Positive for arthralgias.   Skin: Negative.    Allergic/Immunologic: Negative.    Neurological: Negative.    Psychiatric/Behavioral: Negative.         Objective     There were no vitals filed for this visit.       View : No data to display.                Physical Exam      RECENT LABS:  Hematology WBC   Date Value Ref Range Status   12/07/2022 9.02 3.40 - 10.80 10*3/mm3 Final   05/26/2021 7.73 4.5 - 11.0 10*3/uL Final     RBC   Date Value Ref Range Status   12/07/2022 4.30 4.14 - 5.80 10*6/mm3 Final   05/26/2021 4.94 4.5 - 5.9 10*6/uL Final     Hemoglobin   Date Value Ref Range Status   03/09/2023 14.4 13.7 - 17.5 Gram/dL Final   05/26/2021 15.9 13.5 - 17.5 g/dL Final     Hematocrit   Date Value Ref Range Status   03/09/2023 41.2 40.1 - 51.0 % Final     Platelets   Date Value Ref Range Status   12/07/2022 323 140 - 450 10*3/mm3 Final   05/26/2021 208 140 - 440 10*3/uL Final          Assessment & Plan        72-year-old male followed by primary care with hypertension, hyperlipidemia, type 2 diabetes, chronic venous stasis osteoarthritis and gout.  He had developed polyarticular joint pain in December and required hospitalization with diet alteration and IV diuretics.  He did improve fortunately but began to have abdominal discomfort thereafter leading to assessment of gallbladder, renal ultrasound with a potential cortical defect left kidney and a follow-up CT scan of abdomen pelvis that revealed hepatic steatosis and colonic diverticulosis.       The patient was seen by GI for screening colonoscopy and surveillance of adenomatous colonic polyps.  At this point he is having upper abdominal symptoms thought to be gastritis though EGD was performed 4/27/2023 ultimately revealing an ulceration found on the posterior wall of the gastric body.  Biopsy was positive for poorly differentiated adenocarcinoma arising in a background of intestinal metaplasia and high-grade dysplasia, immunostain HER2 negative.  The patient's subsequent CT scan of chest and pelvis showed circumferential thickening of the lesser curvature of the stomach with a central area of ulceration and prominent gastrohepatic ligament lymphadenopathy concerning metastatic disease but no clear evidence of distant metastatic disease.    The patient is contacted by telephone 5/18/2023 and we plan to proceed with PET/CT next available, surgical assessment and follow-up in in [facility] office subsequently.    Patient agrees with this plan.    You have chosen to receive care through a telephone visit today. Do you consent to use a telephone visit for your medical care today? Yes     This visit has been rescheduled as a phone visit to comply with patient safety concerns in accordance with CDC recommendations. Total time of discussion was 26 minutes.    67998 is 5-10 minutes  05381 is 11-20 minutes  42204 is 21 or more minutes

## 2023-05-23 ENCOUNTER — HOSPITAL ENCOUNTER (OUTPATIENT)
Dept: PET IMAGING | Facility: HOSPITAL | Age: 73
Discharge: HOME | End: 2023-05-23
Payer: MEDICARE

## 2023-05-23 ENCOUNTER — APPOINTMENT (OUTPATIENT)
Dept: OTHER | Facility: HOSPITAL | Age: 73
End: 2023-05-23
Payer: MEDICARE

## 2023-05-23 DIAGNOSIS — C16.5 MALIGNANT NEOPLASM OF LESSER CURVATURE OF STOMACH, UNSPECIFIED: ICD-10-CM

## 2023-05-23 DIAGNOSIS — Z09 FOLLOW-UP EXAM: ICD-10-CM

## 2023-05-23 DIAGNOSIS — C16.9 MALIGNANT NEOPLASM OF STOMACH, UNSPECIFIED LOCATION: ICD-10-CM

## 2023-05-23 LAB — GLUCOSE BLDC GLUCOMTR-MCNC: 114 MG/DL (ref 70–130)

## 2023-05-23 PROCEDURE — 78815 PET IMAGE W/CT SKULL-THIGH: CPT

## 2023-05-23 PROCEDURE — A9552 F18 FDG: HCPCS | Performed by: INTERNAL MEDICINE

## 2023-05-23 PROCEDURE — 82948 REAGENT STRIP/BLOOD GLUCOSE: CPT

## 2023-05-23 PROCEDURE — 0 FLUDEOXYGLUCOSE F18 SOLUTION: Performed by: INTERNAL MEDICINE

## 2023-05-23 RX ADMIN — FLUDEOXYGLUCOSE F18 1 DOSE: 300 INJECTION INTRAVENOUS at 07:35

## 2023-05-26 ENCOUNTER — OFFICE VISIT (OUTPATIENT)
Dept: SURGERY | Facility: CLINIC | Age: 73
End: 2023-05-26

## 2023-05-26 ENCOUNTER — LAB (OUTPATIENT)
Dept: LAB | Facility: HOSPITAL | Age: 73
End: 2023-05-26
Payer: MEDICARE

## 2023-05-26 VITALS — BODY MASS INDEX: 31.8 KG/M2 | WEIGHT: 209.8 LBS | HEIGHT: 68 IN

## 2023-05-26 DIAGNOSIS — C16.9 MALIGNANT NEOPLASM OF STOMACH, UNSPECIFIED LOCATION: Primary | ICD-10-CM

## 2023-05-26 DIAGNOSIS — C16.9 GASTRIC ADENOCARCINOMA: Primary | ICD-10-CM

## 2023-05-26 LAB
BASOPHILS # BLD AUTO: 0.03 10*3/MM3 (ref 0–0.2)
BASOPHILS NFR BLD AUTO: 0.4 % (ref 0–1.5)
DEPRECATED RDW RBC AUTO: 42.8 FL (ref 37–54)
EOSINOPHIL # BLD AUTO: 0.2 10*3/MM3 (ref 0–0.4)
EOSINOPHIL NFR BLD AUTO: 2.5 % (ref 0.3–6.2)
ERYTHROCYTE [DISTWIDTH] IN BLOOD BY AUTOMATED COUNT: 13.5 % (ref 12.3–15.4)
HCT VFR BLD AUTO: 38.9 % (ref 37.5–51)
HGB BLD-MCNC: 12.5 G/DL (ref 13–17.7)
HOLD SPECIMEN: NORMAL
IMM GRANULOCYTES # BLD AUTO: 0.02 10*3/MM3 (ref 0–0.05)
IMM GRANULOCYTES NFR BLD AUTO: 0.3 % (ref 0–0.5)
LYMPHOCYTES # BLD AUTO: 2.01 10*3/MM3 (ref 0.7–3.1)
LYMPHOCYTES NFR BLD AUTO: 25.3 % (ref 19.6–45.3)
MCH RBC QN AUTO: 27.9 PG (ref 26.6–33)
MCHC RBC AUTO-ENTMCNC: 32.1 G/DL (ref 31.5–35.7)
MCV RBC AUTO: 86.8 FL (ref 79–97)
MONOCYTES # BLD AUTO: 0.65 10*3/MM3 (ref 0.1–0.9)
MONOCYTES NFR BLD AUTO: 8.2 % (ref 5–12)
NEUTROPHILS NFR BLD AUTO: 5.02 10*3/MM3 (ref 1.7–7)
NEUTROPHILS NFR BLD AUTO: 63.3 % (ref 42.7–76)
NRBC BLD AUTO-RTO: 0 /100 WBC (ref 0–0.2)
PLATELET # BLD AUTO: 275 10*3/MM3 (ref 140–450)
PMV BLD AUTO: 9.6 FL (ref 6–12)
RBC # BLD AUTO: 4.48 10*6/MM3 (ref 4.14–5.8)
WBC NRBC COR # BLD: 7.93 10*3/MM3 (ref 3.4–10.8)
WHOLE BLOOD HOLD COAG: NORMAL

## 2023-05-26 PROCEDURE — 36415 COLL VENOUS BLD VENIPUNCTURE: CPT

## 2023-05-26 PROCEDURE — 85025 COMPLETE CBC W/AUTO DIFF WBC: CPT

## 2023-05-26 RX ORDER — CEFAZOLIN SODIUM 2 G/100ML
2 INJECTION, SOLUTION INTRAVENOUS ONCE
Status: CANCELLED | OUTPATIENT
Start: 2023-05-30 | End: 2023-05-26

## 2023-05-29 NOTE — H&P (VIEW-ONLY)
ASSESSMENT/PLAN:    72-year-old male recently diagnosed with moderately to poorly differentiated gastric adenocarcinoma.    Dr. Mckenzie and I reviewed with Mr. Ken his EGD, pathology, CT, and PET scan findings. CT Abd/Pelvis and PET scan consistent with malignancy noted in the lesser curvature of the stomach. Prior to proceeding with any surgical intervention, he will need to undergo peritoneal washing to evaluate for malignant cells.  Discussed this will entail a diagnostic laparoscopy with peritoneal washing and EGD for direct visualization. We will send peritoneal washing for cytology for analysis of malignant cells. Once these results are available, will make further recommendations. If the peritoneal washing is negative, Dr. Mckenzie discussed the option for surgical intervention. He reviewed the nature of the procedure, benefits and risks, and expected recovery. Patient is in agreement and wishes to proceed with the above recommendations. All questions were answered. Orders placed for diagnostic laparoscopy with peritoneal washing and EGD.  Will schedule him for next week with Dr. Mckenzie.    He is following with Dr. Ludwig; he has an office visit this coming Tuesday.  Recommend he keep this appointment.     CC:     Discuss newly diagnosed stomach cancer    HPI:    72-year-old male who presents today for evaluation of recently diagnosed gastric cancer. He states around September 2022, he began having intermittent episodes of vomiting and diarrhea. On average, this would occur once a week.  However, this has become more consistent, occurring 3 times weekly.  He reports occassional heartburn and reflux and has been on Omeprazole for the past 6 months. He has had abdominal pain, localized to the upper abdomen. He recently underwent an EGD by Dr. Reilly for further evaluation of his symptoms.  A gastric ulcer was noted and biopsied, pathology returned as gastric adenocarcinoma. He is following with Dr. Ludwig  with oncology. He is up to date on his colonoscopy.     ENDOSCOPY:   · Colonoscopy 3/9/2023 Dr. Reilly: cecal polyp - fragments of hyperplastic polyp and tubular adenoma, rectal polyp - hyperplastic polyp  • EGD 4/27/2023 Dr. Reilly: Scattered mild mucosal changes characterized by granularity were found at the gastroesophageal junction, diffuse mild mucosal changes characterized by atrophy found in the gastric body, localized severe mucosal changes characterized by ulceration were found in the posterior wall of the gastric body  o Gastric ulcer biopsy: moderately to poorly differentiated adenocarcinoma arising in a background of intestinal metaplasia with high-grade dysplasia, gastric ulcer with necroinflammatory exudate present, [id number] negative  o Random gastric biopsy: mild chronic gastritis, intestinal metaplasia present, no dysplasia identified, H. pylori negative  o Random lower esophagus biopsy: squamous mucosa with reactive changes, no increased intraepithelial eosinophils identified    RADIOLOGY:   • 1/10/2023 CT Abdomen/Pelvis with & without contrast: No renal calculi, hydronephrosis or suspicious renal mass. Diffuse hepatic steatosis. Colonic diverticulosis. Umbilical hernia.  • 5/4/2023 CT Chest/Abdomen/Pelvis: hepatic steatosis, colonic diverticulosis, masslike circumferential wall thickening along the lesser curvature of the stomach measuring up to 1.2cm in thickness, measures at least 7cm in diameter, central area of presumed ulceration measuring approximately 1.7cm in diameter, prominent gastric hepatic ligament lymph nodes concerning for metastatic disease, no evidence of distant metastatic disease  • 5/23/2023 PET/CT: Low-level activity at a short segment of the lesser curvature of the stomach likely representing a site of gastric malignancy, nodes within the abdomen have very low-level metabolic activity, shotty nodes in both axilla and both groin regions with very low-level activity are  "likely benign    LABS:    • 3/9/2023 glucose 136, potassium 3.5  • 3/17/2023 CBC unremarkable     SOCIAL HISTORY:   • Denies tobacco use  • Occasional alcohol use  • Reports marijuana use    FAMILY HISTORY:    • Colorectal cancer: Sister    PREVIOUS ABDOMINAL SURGERY:  • None    OTHER SURGERY:  • Bilateral carpal tunnel release  • Right total knee arthroplasty  • Bilateral knee arthroscopy     PAST MEDICAL HISTORY:    • BPH  • Carpal tunnel syndrome  • Depression  • Diabetes Mellitus  • Gout  • Hyperlipidemia  • Hypertension  • Neuropathy (feet)  • Pulmonary arterial hypertension  • Restless leg syndrome     ALLERGIES:   • None    MEDICATIONS:   • Allopurinol  • Amlodipine  • CBD oral oil  • Duloxetine  • Furosemide  • Lisinopril  • Metformin  • Metoprolol  • Omeprazole  • Potassium Chloride  • Ropinirole  • Tamsulosin  • Valsartan    ROS:    No cough, chest pain, shortness of air.  All other systems reviewed and negative other than presenting complaints.    PHYSICAL EXAM:   • Constitutional: Well-developed, well-nourished, no acute distress  • Vital signs:   o Ht: 172.7cm (68\")  o Wt: 95.2kg (209lb)  o BMI: 31.90  • Eyes: Conjunctiva normal, sclera nonicteric  • ENMT: Hearing grossly normal, oral mucosa moist  • Respiratory: Clear to auscultation, normal inspiratory effort  • Cardiovascular: Regular rate, no murmur, mild bilateral lower extremity edema (left > right), no jugular venous distention  • Gastrointestinal: Soft, nontender, no palpable mass, small reducible umbilical hernia   • Skin: Warm, dry, no rash on visualized skin surfaces  • Musculoskeletal: Symmetric strength, normal gait  • Psychiatric: Alert and oriented ×3, normal affect     Jasmin Garcia PA-C    [facility] - General Surgery   [address], [address]  [place] [address]    [address], [address]  [place] [address]    Office: [phone]  Fax: [phone]    "

## 2023-05-29 NOTE — PROGRESS NOTES
ASSESSMENT/PLAN:    72-year-old male recently diagnosed with moderately to poorly differentiated gastric adenocarcinoma.    Dr. Mckenzie and I reviewed with Mr. Ken his EGD, pathology, CT, and PET scan findings. CT Abd/Pelvis and PET scan consistent with malignancy noted in the lesser curvature of the stomach. Prior to proceeding with any surgical intervention, he will need to undergo peritoneal washing to evaluate for malignant cells.  Discussed this will entail a diagnostic laparoscopy with peritoneal washing and EGD for direct visualization. We will send peritoneal washing for cytology for analysis of malignant cells. Once these results are available, will make further recommendations. If the peritoneal washing is negative, Dr. Mckenzie discussed the option for surgical intervention. He reviewed the nature of the procedure, benefits and risks, and expected recovery. Patient is in agreement and wishes to proceed with the above recommendations. All questions were answered. Orders placed for diagnostic laparoscopy with peritoneal washing and EGD.  Will schedule him for next week with Dr. Mckenzie.    He is following with Dr. Ludwig; he has an office visit this coming Tuesday.  Recommend he keep this appointment.     CC:     Discuss newly diagnosed stomach cancer    HPI:    72-year-old male who presents today for evaluation of recently diagnosed gastric cancer. He states around September 2022, he began having intermittent episodes of vomiting and diarrhea. On average, this would occur once a week.  However, this has become more consistent, occurring 3 times weekly.  He reports occassional heartburn and reflux and has been on Omeprazole for the past 6 months. He has had abdominal pain, localized to the upper abdomen. He recently underwent an EGD by Dr. Reilly for further evaluation of his symptoms.  A gastric ulcer was noted and biopsied, pathology returned as gastric adenocarcinoma. He is following with Dr. Ludwig  with oncology. He is up to date on his colonoscopy.     ENDOSCOPY:   · Colonoscopy 3/9/2023 Dr. Reilly: cecal polyp - fragments of hyperplastic polyp and tubular adenoma, rectal polyp - hyperplastic polyp  • EGD 4/27/2023 Dr. Reilly: Scattered mild mucosal changes characterized by granularity were found at the gastroesophageal junction, diffuse mild mucosal changes characterized by atrophy found in the gastric body, localized severe mucosal changes characterized by ulceration were found in the posterior wall of the gastric body  o Gastric ulcer biopsy: moderately to poorly differentiated adenocarcinoma arising in a background of intestinal metaplasia with high-grade dysplasia, gastric ulcer with necroinflammatory exudate present, [id number] negative  o Random gastric biopsy: mild chronic gastritis, intestinal metaplasia present, no dysplasia identified, H. pylori negative  o Random lower esophagus biopsy: squamous mucosa with reactive changes, no increased intraepithelial eosinophils identified    RADIOLOGY:   • 1/10/2023 CT Abdomen/Pelvis with & without contrast: No renal calculi, hydronephrosis or suspicious renal mass. Diffuse hepatic steatosis. Colonic diverticulosis. Umbilical hernia.  • 5/4/2023 CT Chest/Abdomen/Pelvis: hepatic steatosis, colonic diverticulosis, masslike circumferential wall thickening along the lesser curvature of the stomach measuring up to 1.2cm in thickness, measures at least 7cm in diameter, central area of presumed ulceration measuring approximately 1.7cm in diameter, prominent gastric hepatic ligament lymph nodes concerning for metastatic disease, no evidence of distant metastatic disease  • 5/23/2023 PET/CT: Low-level activity at a short segment of the lesser curvature of the stomach likely representing a site of gastric malignancy, nodes within the abdomen have very low-level metabolic activity, shotty nodes in both axilla and both groin regions with very low-level activity are  "likely benign    LABS:    • 3/9/2023 glucose 136, potassium 3.5  • 3/17/2023 CBC unremarkable     SOCIAL HISTORY:   • Denies tobacco use  • Occasional alcohol use  • Reports marijuana use    FAMILY HISTORY:    • Colorectal cancer: Sister    PREVIOUS ABDOMINAL SURGERY:  • None    OTHER SURGERY:  • Bilateral carpal tunnel release  • Right total knee arthroplasty  • Bilateral knee arthroscopy     PAST MEDICAL HISTORY:    • BPH  • Carpal tunnel syndrome  • Depression  • Diabetes Mellitus  • Gout  • Hyperlipidemia  • Hypertension  • Neuropathy (feet)  • Pulmonary arterial hypertension  • Restless leg syndrome     ALLERGIES:   • None    MEDICATIONS:   • Allopurinol  • Amlodipine  • CBD oral oil  • Duloxetine  • Furosemide  • Lisinopril  • Metformin  • Metoprolol  • Omeprazole  • Potassium Chloride  • Ropinirole  • Tamsulosin  • Valsartan    ROS:    No cough, chest pain, shortness of air.  All other systems reviewed and negative other than presenting complaints.    PHYSICAL EXAM:   • Constitutional: Well-developed, well-nourished, no acute distress  • Vital signs:   o Ht: 172.7cm (68\")  o Wt: 95.2kg (209lb)  o BMI: 31.90  • Eyes: Conjunctiva normal, sclera nonicteric  • ENMT: Hearing grossly normal, oral mucosa moist  • Respiratory: Clear to auscultation, normal inspiratory effort  • Cardiovascular: Regular rate, no murmur, mild bilateral lower extremity edema (left > right), no jugular venous distention  • Gastrointestinal: Soft, nontender, no palpable mass, small reducible umbilical hernia   • Skin: Warm, dry, no rash on visualized skin surfaces  • Musculoskeletal: Symmetric strength, normal gait  • Psychiatric: Alert and oriented ×3, normal affect     Jasmin Garcia PA-C    [facility] - General Surgery   [address], [address]  [place] [address]    [address], [address]  [place] [address]    Office: [phone]  Fax: [phone]    "

## 2023-05-30 ENCOUNTER — ANESTHESIA EVENT (OUTPATIENT)
Dept: PERIOP | Facility: HOSPITAL | Age: 73
End: 2023-05-30
Payer: MEDICARE

## 2023-05-30 ENCOUNTER — HOSPITAL ENCOUNTER (OUTPATIENT)
Facility: HOSPITAL | Age: 73
Setting detail: HOSPITAL OUTPATIENT SURGERY
Discharge: HOME | End: 2023-05-30
Attending: SURGERY | Admitting: SURGERY
Payer: MEDICARE

## 2023-05-30 ENCOUNTER — ANESTHESIA (OUTPATIENT)
Dept: PERIOP | Facility: HOSPITAL | Age: 73
End: 2023-05-30
Payer: MEDICARE

## 2023-05-30 VITALS
RESPIRATION RATE: 16 BRPM | SYSTOLIC BLOOD PRESSURE: 145 MMHG | OXYGEN SATURATION: 98 % | TEMPERATURE: 97.6 F | HEART RATE: 76 BPM | DIASTOLIC BLOOD PRESSURE: 77 MMHG

## 2023-05-30 DIAGNOSIS — C16.9 GASTRIC ADENOCARCINOMA: ICD-10-CM

## 2023-05-30 LAB — GLUCOSE BLDC GLUCOMTR-MCNC: 100 MG/DL (ref 70–130)

## 2023-05-30 PROCEDURE — 25010000002 PROPOFOL 10 MG/ML EMULSION: Performed by: REGISTERED NURSE

## 2023-05-30 PROCEDURE — 25010000002 HYDROMORPHONE 1 MG/ML SOLUTION: Performed by: REGISTERED NURSE

## 2023-05-30 PROCEDURE — 25010000002 CEFAZOLIN IN DEXTROSE 2-4 GM/100ML-% SOLUTION

## 2023-05-30 PROCEDURE — 25010000002 DEXAMETHASONE SODIUM PHOSPHATE 20 MG/5ML SOLUTION: Performed by: REGISTERED NURSE

## 2023-05-30 PROCEDURE — 25010000002 SUGAMMADEX 200 MG/2ML SOLUTION: Performed by: ANESTHESIOLOGY

## 2023-05-30 PROCEDURE — 25010000002 ONDANSETRON PER 1 MG: Performed by: REGISTERED NURSE

## 2023-05-30 PROCEDURE — 49320 DIAG LAPARO SEPARATE PROC: CPT | Performed by: SURGERY

## 2023-05-30 PROCEDURE — 82948 REAGENT STRIP/BLOOD GLUCOSE: CPT

## 2023-05-30 PROCEDURE — 43235 EGD DIAGNOSTIC BRUSH WASH: CPT | Performed by: SURGERY

## 2023-05-30 PROCEDURE — 88112 CYTOPATH CELL ENHANCE TECH: CPT | Performed by: SURGERY

## 2023-05-30 PROCEDURE — 88305 TISSUE EXAM BY PATHOLOGIST: CPT | Performed by: SURGERY

## 2023-05-30 RX ORDER — DIPHENHYDRAMINE HYDROCHLORIDE 50 MG/ML
12.5 INJECTION INTRAMUSCULAR; INTRAVENOUS
Status: DISCONTINUED | OUTPATIENT
Start: 2023-05-30 | End: 2023-05-30 | Stop reason: HOSPADM

## 2023-05-30 RX ORDER — OXYCODONE AND ACETAMINOPHEN 7.5; 325 MG/1; MG/1
1 TABLET ORAL EVERY 4 HOURS PRN
Status: DISCONTINUED | OUTPATIENT
Start: 2023-05-30 | End: 2023-05-30 | Stop reason: HOSPADM

## 2023-05-30 RX ORDER — BUPIVACAINE HYDROCHLORIDE AND EPINEPHRINE 5; 5 MG/ML; UG/ML
INJECTION, SOLUTION EPIDURAL; INTRACAUDAL; PERINEURAL AS NEEDED
Status: DISCONTINUED | OUTPATIENT
Start: 2023-05-30 | End: 2023-05-30 | Stop reason: HOSPADM

## 2023-05-30 RX ORDER — NALOXONE HCL 0.4 MG/ML
0.2 VIAL (ML) INJECTION AS NEEDED
Status: DISCONTINUED | OUTPATIENT
Start: 2023-05-30 | End: 2023-05-30 | Stop reason: HOSPADM

## 2023-05-30 RX ORDER — ONDANSETRON 4 MG/1
4 TABLET, FILM COATED ORAL EVERY 6 HOURS PRN
Qty: 10 TABLET | Refills: 0 | Status: SHIPPED | OUTPATIENT
Start: 2023-05-30

## 2023-05-30 RX ORDER — FAMOTIDINE 10 MG/ML
20 INJECTION, SOLUTION INTRAVENOUS ONCE
Status: COMPLETED | OUTPATIENT
Start: 2023-05-30 | End: 2023-05-30

## 2023-05-30 RX ORDER — HYDRALAZINE HYDROCHLORIDE 20 MG/ML
5 INJECTION INTRAMUSCULAR; INTRAVENOUS
Status: DISCONTINUED | OUTPATIENT
Start: 2023-05-30 | End: 2023-05-30 | Stop reason: HOSPADM

## 2023-05-30 RX ORDER — MIDAZOLAM HYDROCHLORIDE 1 MG/ML
0.5 INJECTION INTRAMUSCULAR; INTRAVENOUS
Status: DISCONTINUED | OUTPATIENT
Start: 2023-05-30 | End: 2023-05-30 | Stop reason: HOSPADM

## 2023-05-30 RX ORDER — SODIUM CHLORIDE 0.9 % (FLUSH) 0.9 %
3 SYRINGE (ML) INJECTION EVERY 12 HOURS SCHEDULED
Status: DISCONTINUED | OUTPATIENT
Start: 2023-05-30 | End: 2023-05-30 | Stop reason: HOSPADM

## 2023-05-30 RX ORDER — LIDOCAINE HYDROCHLORIDE 20 MG/ML
INJECTION, SOLUTION INFILTRATION; PERINEURAL AS NEEDED
Status: DISCONTINUED | OUTPATIENT
Start: 2023-05-30 | End: 2023-05-30 | Stop reason: SURG

## 2023-05-30 RX ORDER — SODIUM CHLORIDE 9 MG/ML
INJECTION, SOLUTION INTRAVENOUS AS NEEDED
Status: DISCONTINUED | OUTPATIENT
Start: 2023-05-30 | End: 2023-05-30 | Stop reason: HOSPADM

## 2023-05-30 RX ORDER — DEXAMETHASONE SODIUM PHOSPHATE 4 MG/ML
INJECTION, SOLUTION INTRA-ARTICULAR; INTRALESIONAL; INTRAMUSCULAR; INTRAVENOUS; SOFT TISSUE AS NEEDED
Status: DISCONTINUED | OUTPATIENT
Start: 2023-05-30 | End: 2023-05-30 | Stop reason: SURG

## 2023-05-30 RX ORDER — LABETALOL HYDROCHLORIDE 5 MG/ML
5 INJECTION, SOLUTION INTRAVENOUS
Status: DISCONTINUED | OUTPATIENT
Start: 2023-05-30 | End: 2023-05-30 | Stop reason: HOSPADM

## 2023-05-30 RX ORDER — PROMETHAZINE HYDROCHLORIDE 25 MG/1
25 SUPPOSITORY RECTAL ONCE AS NEEDED
Status: DISCONTINUED | OUTPATIENT
Start: 2023-05-30 | End: 2023-05-30 | Stop reason: HOSPADM

## 2023-05-30 RX ORDER — PROPOFOL 10 MG/ML
VIAL (ML) INTRAVENOUS AS NEEDED
Status: DISCONTINUED | OUTPATIENT
Start: 2023-05-30 | End: 2023-05-30 | Stop reason: SURG

## 2023-05-30 RX ORDER — CEFAZOLIN SODIUM 2 G/100ML
2 INJECTION, SOLUTION INTRAVENOUS ONCE
Status: COMPLETED | OUTPATIENT
Start: 2023-05-30 | End: 2023-05-30

## 2023-05-30 RX ORDER — SODIUM CHLORIDE, SODIUM LACTATE, POTASSIUM CHLORIDE, CALCIUM CHLORIDE 600; 310; 30; 20 MG/100ML; MG/100ML; MG/100ML; MG/100ML
9 INJECTION, SOLUTION INTRAVENOUS CONTINUOUS
Status: DISCONTINUED | OUTPATIENT
Start: 2023-05-30 | End: 2023-05-30 | Stop reason: HOSPADM

## 2023-05-30 RX ORDER — SODIUM CHLORIDE 0.9 % (FLUSH) 0.9 %
3-10 SYRINGE (ML) INJECTION AS NEEDED
Status: DISCONTINUED | OUTPATIENT
Start: 2023-05-30 | End: 2023-05-30 | Stop reason: HOSPADM

## 2023-05-30 RX ORDER — LIDOCAINE HYDROCHLORIDE 10 MG/ML
0.5 INJECTION, SOLUTION EPIDURAL; INFILTRATION; INTRACAUDAL; PERINEURAL ONCE AS NEEDED
Status: DISCONTINUED | OUTPATIENT
Start: 2023-05-30 | End: 2023-05-30 | Stop reason: HOSPADM

## 2023-05-30 RX ORDER — HYDROCODONE BITARTRATE AND ACETAMINOPHEN 7.5; 325 MG/1; MG/1
1 TABLET ORAL ONCE AS NEEDED
Status: COMPLETED | OUTPATIENT
Start: 2023-05-30 | End: 2023-05-30

## 2023-05-30 RX ORDER — HYDROCODONE BITARTRATE AND ACETAMINOPHEN 5; 325 MG/1; MG/1
1 TABLET ORAL EVERY 4 HOURS PRN
Qty: 10 TABLET | Refills: 0 | Status: SHIPPED | OUTPATIENT
Start: 2023-05-30 | End: 2023-06-12

## 2023-05-30 RX ORDER — MAGNESIUM HYDROXIDE 1200 MG/15ML
LIQUID ORAL AS NEEDED
Status: DISCONTINUED | OUTPATIENT
Start: 2023-05-30 | End: 2023-05-30 | Stop reason: HOSPADM

## 2023-05-30 RX ORDER — ONDANSETRON 2 MG/ML
4 INJECTION INTRAMUSCULAR; INTRAVENOUS ONCE AS NEEDED
Status: COMPLETED | OUTPATIENT
Start: 2023-05-30 | End: 2023-05-30

## 2023-05-30 RX ORDER — PROMETHAZINE HYDROCHLORIDE 25 MG/1
25 TABLET ORAL ONCE AS NEEDED
Status: DISCONTINUED | OUTPATIENT
Start: 2023-05-30 | End: 2023-05-30 | Stop reason: HOSPADM

## 2023-05-30 RX ORDER — IPRATROPIUM BROMIDE AND ALBUTEROL SULFATE 2.5; .5 MG/3ML; MG/3ML
3 SOLUTION RESPIRATORY (INHALATION) ONCE AS NEEDED
Status: DISCONTINUED | OUTPATIENT
Start: 2023-05-30 | End: 2023-05-30 | Stop reason: HOSPADM

## 2023-05-30 RX ORDER — ONDANSETRON 2 MG/ML
INJECTION INTRAMUSCULAR; INTRAVENOUS AS NEEDED
Status: DISCONTINUED | OUTPATIENT
Start: 2023-05-30 | End: 2023-05-30 | Stop reason: SURG

## 2023-05-30 RX ORDER — FLUMAZENIL 0.1 MG/ML
0.2 INJECTION INTRAVENOUS AS NEEDED
Status: DISCONTINUED | OUTPATIENT
Start: 2023-05-30 | End: 2023-05-30 | Stop reason: HOSPADM

## 2023-05-30 RX ORDER — EPHEDRINE SULFATE 50 MG/ML
5 INJECTION, SOLUTION INTRAVENOUS ONCE AS NEEDED
Status: DISCONTINUED | OUTPATIENT
Start: 2023-05-30 | End: 2023-05-30 | Stop reason: HOSPADM

## 2023-05-30 RX ORDER — FENTANYL CITRATE 50 UG/ML
50 INJECTION, SOLUTION INTRAMUSCULAR; INTRAVENOUS
Status: DISCONTINUED | OUTPATIENT
Start: 2023-05-30 | End: 2023-05-30 | Stop reason: HOSPADM

## 2023-05-30 RX ORDER — DROPERIDOL 2.5 MG/ML
0.62 INJECTION, SOLUTION INTRAMUSCULAR; INTRAVENOUS
Status: DISCONTINUED | OUTPATIENT
Start: 2023-05-30 | End: 2023-05-30 | Stop reason: HOSPADM

## 2023-05-30 RX ORDER — FENTANYL CITRATE 50 UG/ML
50 INJECTION, SOLUTION INTRAMUSCULAR; INTRAVENOUS ONCE AS NEEDED
Status: DISCONTINUED | OUTPATIENT
Start: 2023-05-30 | End: 2023-05-30 | Stop reason: HOSPADM

## 2023-05-30 RX ORDER — HYDROMORPHONE HYDROCHLORIDE 1 MG/ML
0.5 INJECTION, SOLUTION INTRAMUSCULAR; INTRAVENOUS; SUBCUTANEOUS
Status: DISCONTINUED | OUTPATIENT
Start: 2023-05-30 | End: 2023-05-30 | Stop reason: HOSPADM

## 2023-05-30 RX ORDER — ROCURONIUM BROMIDE 10 MG/ML
INJECTION, SOLUTION INTRAVENOUS AS NEEDED
Status: DISCONTINUED | OUTPATIENT
Start: 2023-05-30 | End: 2023-05-30 | Stop reason: SURG

## 2023-05-30 RX ADMIN — SUGAMMADEX 200 MG: 100 INJECTION, SOLUTION INTRAVENOUS at 14:00

## 2023-05-30 RX ADMIN — ROCURONIUM BROMIDE 50 MG: 50 INJECTION, SOLUTION INTRAVENOUS at 13:29

## 2023-05-30 RX ADMIN — FAMOTIDINE 20 MG: 10 INJECTION INTRAVENOUS at 10:00

## 2023-05-30 RX ADMIN — CEFAZOLIN SODIUM 2 G: 2 INJECTION, SOLUTION INTRAVENOUS at 13:34

## 2023-05-30 RX ADMIN — CEFAZOLIN SODIUM 2 G: 2 INJECTION, SOLUTION INTRAVENOUS at 13:15

## 2023-05-30 RX ADMIN — HYDROCODONE BITARTRATE AND ACETAMINOPHEN 1 TABLET: 7.5; 325 TABLET ORAL at 14:38

## 2023-05-30 RX ADMIN — ONDANSETRON 4 MG: 2 INJECTION INTRAMUSCULAR; INTRAVENOUS at 13:40

## 2023-05-30 RX ADMIN — LIDOCAINE HYDROCHLORIDE 100 MG: 20 INJECTION, SOLUTION INFILTRATION; PERINEURAL at 13:29

## 2023-05-30 RX ADMIN — ROCURONIUM BROMIDE 20 MG: 50 INJECTION, SOLUTION INTRAVENOUS at 13:53

## 2023-05-30 RX ADMIN — SODIUM CHLORIDE, POTASSIUM CHLORIDE, SODIUM LACTATE AND CALCIUM CHLORIDE 9 ML/HR: 600; 310; 30; 20 INJECTION, SOLUTION INTRAVENOUS at 10:00

## 2023-05-30 RX ADMIN — HYDROMORPHONE HYDROCHLORIDE 0.5 MG: 1 INJECTION, SOLUTION INTRAMUSCULAR; INTRAVENOUS; SUBCUTANEOUS at 13:27

## 2023-05-30 RX ADMIN — PROPOFOL 200 MG: 10 INJECTION, EMULSION INTRAVENOUS at 13:29

## 2023-05-30 RX ADMIN — ONDANSETRON 4 MG: 2 INJECTION INTRAMUSCULAR; INTRAVENOUS at 14:38

## 2023-05-30 RX ADMIN — DEXAMETHASONE SODIUM PHOSPHATE 10 MG: 4 INJECTION, SOLUTION INTRAMUSCULAR; INTRAVENOUS at 13:40

## 2023-05-30 NOTE — ANESTHESIA PROCEDURE NOTES
Airway  Urgency: elective    Date/Time: 5/30/2023 1:32 PM  Airway not difficult    General Information and Staff    Patient location during procedure: OR  CRNA/CAA: Maria Del Carmen Keller CRNA    Indications and Patient Condition  Indications for airway management: airway protection    Preoxygenated: yes  MILS maintained throughout  Mask difficulty assessment: 1 - vent by mask    Final Airway Details  Final airway type: endotracheal airway      Successful airway: ETT  Cuffed: yes   Successful intubation technique: direct laryngoscopy  Endotracheal tube insertion site: oral  Blade: Fatuma  Blade size: 3  ETT size (mm): 7.5  Cormack-Lehane Classification: grade IIa - partial view of glottis  Placement verified by: chest auscultation and capnometry   Measured from: lips  ETT/EBT  to lips (cm): 21  Number of attempts at approach: 1  Assessment: lips, teeth, and gum same as pre-op and atraumatic intubation    Additional Comments  Atraumatic insertion

## 2023-05-30 NOTE — ANESTHESIA PREPROCEDURE EVALUATION
Anesthesia Evaluation     Patient summary reviewed and Nursing notes reviewed   NPO Solid Status: > 8 hours  NPO Liquid Status: > 8 hours           Airway   Mallampati: II  Neck ROM: full  No difficulty expected  Dental - normal exam     Pulmonary    Cardiovascular     (+) hypertension, hyperlipidemia,     ROS comment: Pulmonary arterial hypertension    Neuro/Psych  (+) numbness, psychiatric history Depression,    GI/Hepatic/Renal/Endo    (+) obesity,   diabetes mellitus,     Musculoskeletal     Abdominal    Substance History      OB/GYN          Other      history of cancer (stomach) active                    Anesthesia Plan    ASA 3     general     intravenous induction     Anesthetic plan, risks, benefits, and alternatives have been provided, discussed and informed consent has been obtained with: patient.        CODE STATUS:

## 2023-05-30 NOTE — ANESTHESIA POSTPROCEDURE EVALUATION
Patient: Mark Ken    Procedure Summary     Date: 05/30/23 Room / Location: [facility] OR [address] [place] [address] MAIN OR    Anesthesia Start: 1321 Anesthesia Stop: 1411    Procedures:       DIAGNOSTIC LAPAROSCOPY with peritoneal washing (Abdomen)      ESOPHAGOGASTRODUODENOSCOPY (Esophagus) Diagnosis:       Gastric adenocarcinoma      (Gastric adenocarcinoma [C16.9])    Surgeons: Tito Mckenzie MD Provider: Bobby Lay MD    Anesthesia Type: general ASA Status: 3          Anesthesia Type: general    Vitals  Vitals Value Taken Time   /79 05/30/23 1445   Temp 36.4 °C (97.6 °F) 05/30/23 1410   Pulse 70 05/30/23 1451   Resp 16 05/30/23 1445   SpO2 100 % 05/30/23 1451   Vitals shown include unvalidated device data.        Post Anesthesia Care and Evaluation    Patient location during evaluation: bedside  Patient participation: complete - patient participated  Level of consciousness: awake  Pain management: adequate    Airway patency: patent  Anesthetic complications: No anesthetic complications    Cardiovascular status: acceptable  Respiratory status: acceptable  Hydration status: acceptable    Comments: /77 (BP Location: Left leg)   Pulse 76   Temp 36.4 °C (97.6 °F) (Oral)   Resp 16   SpO2 98%

## 2023-05-30 NOTE — OP NOTE
PREOPERATIVE DIAGNOSIS:  · Gastric carcinoma    POSTOPERATIVE DIAGNOSIS (FINDINGS):  · Gastric carcinoma approximately 4 cm distal to the GE junction along the lesser curvature  · No evidence of liver metastasis, peritoneal implants, or omental implants    PROCEDURE:  · EGD  · Laparoscopy with peritoneal washings    SURGEON:  Tito Mckenzie MD    ASSISTANT:  Claudette Gonzalez was responsible for performing the following activities: suction, irrigation, suturing, closing, retraction, camera holding, and placing dressing, and their skilled assistance was necessary for the success of this case.    ANESTHESIA:  General    EBL:  Minimal    SPECIMEN(S):  Peritoneal washings    DESCRIPTION:  In supine position under general anesthetic gastroscope was inserted into the esophagus stomach and duodenum.  First and second portions of the duodenum were grossly normal.  Gastric antrum and body were normal.  Retroflexed view of the fundus confirmed an ulcerated tumor located along the lesser curvature immediately distal to the GE junction.  On antegrade view, the tumor was approximately 4 cm distal to the GE junction along the lesser curvature of the stomach.  GE junction and esophagus were normal.    Prepped and draped usual sterile manner.  Half percent Marcaine with epinephrine infiltrated locally.  Small transverse incision made above the umbilicus and Veress needle inserted with upper extraction on the abdominal wall.  Abdomen insufflated 15 mmHg and a 5 mm Optiview trocar inserted.  Additional left midabdomen 5 mm trocar inserted.  There were no apparent liver or peritoneal metastasis.  There were no implants on the omentum and no other gross abnormalities.  3 L of normal saline were irrigated throughout the peritoneal cavity and then well over 2 L were aspirated and sent for cytologic evaluation.  Good hemostasis was noted.  CO2 was released.  Skin edges were closed with 5-0 Vicryl subcuticular.  Tolerated well.    Tito  CLAY Mckenzie.

## 2023-06-01 LAB
CYTO UR: NORMAL
LAB AP CASE REPORT: NORMAL
PATH REPORT.FINAL DX SPEC: NORMAL
PATH REPORT.GROSS SPEC: NORMAL

## 2023-06-01 NOTE — PROGRESS NOTES
[place] Islas,  Mr. Ken's peritoneal washings were negative.  When I did his EGD, the tumor proved to be an ulcerated cancer only 4 cm distal to the GE junction on the lesser curvature.  If/when he comes to surgery, he would need an esophagogastrectomy.  I spoke with Dr. Varela who is going to make an appointment to see him.  He will likely favor upfront chemotherapy prior to considering surgical intervention.

## 2023-06-02 ENCOUNTER — TELEPHONE (OUTPATIENT)
Dept: SURGERY | Facility: CLINIC | Age: 73
End: 2023-06-02
Payer: MEDICARE

## 2023-06-09 ENCOUNTER — PATIENT ROUNDING (BHMG ONLY) (OUTPATIENT)
Dept: SURGERY | Facility: CLINIC | Age: 73
End: 2023-06-09
Payer: MEDICARE

## 2023-06-09 NOTE — PROGRESS NOTES
June 9, 2023    [facility]  [address] [address] [phone].    Thank you for your recent visit to our practice,        We're always looking for ways to make our patients' experiences even better. Do you have recommendations on ways we may improve?     Overall were you satisfied with your first visit to our practice?           Thank you, and have a great day.

## 2023-06-12 ENCOUNTER — OFFICE VISIT (OUTPATIENT)
Dept: SURGERY | Facility: CLINIC | Age: 73
End: 2023-06-12
Payer: MEDICARE

## 2023-06-12 ENCOUNTER — OFFICE (OUTPATIENT)
Dept: URBAN - METROPOLITAN AREA CLINIC 64 | Facility: CLINIC | Age: 73
End: 2023-06-12

## 2023-06-12 VITALS — DIASTOLIC BLOOD PRESSURE: 82 MMHG | HEART RATE: 87 BPM | SYSTOLIC BLOOD PRESSURE: 152 MMHG | OXYGEN SATURATION: 97 %

## 2023-06-12 DIAGNOSIS — R93.5 ABNORMAL FINDINGS ON DIAGNOSTIC IMAGING OF OTHER ABDOMINAL R: ICD-10-CM

## 2023-06-12 DIAGNOSIS — Z48.89 POSTOPERATIVE VISIT: Primary | ICD-10-CM

## 2023-06-12 DIAGNOSIS — C16.9 MALIGNANT NEOPLASM OF STOMACH, UNSPECIFIED: ICD-10-CM

## 2023-06-12 PROCEDURE — 99204 OFFICE O/P NEW MOD 45 MIN: CPT | Mod: 95 | Performed by: INTERNAL MEDICINE

## 2023-06-12 PROCEDURE — 99024 POSTOP FOLLOW-UP VISIT: CPT | Performed by: SURGERY

## 2023-06-12 PROCEDURE — 1159F MED LIST DOCD IN RCRD: CPT | Performed by: SURGERY

## 2023-06-12 PROCEDURE — 1160F RVW MEDS BY RX/DR IN RCRD: CPT | Performed by: SURGERY

## 2023-06-12 NOTE — PROGRESS NOTES
Postoperative visit    EGD and laparoscopy with peritoneal washings on 5/30/2023    Findings:  · Gastric carcinoma approximately 4 cm distal to the GE junction along the lesser curvature  · Peritoneal washings were negative for malignant cells    Office visit: I discussed with him and his son the findings at the time of surgery.  He has been seen by Dr. Varela, with whom I discussed his care.  After discussion with Dr. Varela, he is to speak with Dr. Hodge today regarding endoscopic ultrasound.  If endoscopic ultrasound demonstrated T1 tumor, then he likely would be a good candidate for initial surgery.  However, if the lesion is more advanced, consideration will need to be given for neoadjuvant chemotherapy.  I discussed all this with Mr.'s Ken today.  He will speak with Dr. Hodge later today and has an appointment with Dr. Ludwig tomorrow.

## 2023-06-13 ENCOUNTER — PREP FOR SURGERY (OUTPATIENT)
Dept: SURGERY | Facility: CLINIC | Age: 73
End: 2023-06-13
Payer: MEDICARE

## 2023-06-13 ENCOUNTER — LAB (OUTPATIENT)
Dept: OTHER | Facility: HOSPITAL | Age: 73
End: 2023-06-13
Payer: MEDICARE

## 2023-06-13 ENCOUNTER — CONSULT (OUTPATIENT)
Dept: ONCOLOGY | Facility: CLINIC | Age: 73
End: 2023-06-13
Payer: MEDICARE

## 2023-06-13 VITALS
DIASTOLIC BLOOD PRESSURE: 77 MMHG | TEMPERATURE: 98.2 F | SYSTOLIC BLOOD PRESSURE: 157 MMHG | HEART RATE: 90 BPM | OXYGEN SATURATION: 99 % | RESPIRATION RATE: 18 BRPM | WEIGHT: 206.1 LBS | HEIGHT: 68 IN | BODY MASS INDEX: 31.24 KG/M2

## 2023-06-13 DIAGNOSIS — R60.0 BILATERAL LOWER EXTREMITY EDEMA: Primary | ICD-10-CM

## 2023-06-13 DIAGNOSIS — R94.5 ABNORMAL RESULTS OF LIVER FUNCTION STUDIES: ICD-10-CM

## 2023-06-13 DIAGNOSIS — C16.9 MALIGNANT NEOPLASM OF STOMACH, UNSPECIFIED LOCATION: ICD-10-CM

## 2023-06-13 DIAGNOSIS — G62.9 PERIPHERAL POLYNEUROPATHY: ICD-10-CM

## 2023-06-13 DIAGNOSIS — Z01.810 PREOP CARDIOVASCULAR EXAM: Primary | ICD-10-CM

## 2023-06-13 DIAGNOSIS — E11.9 TYPE 2 DIABETES MELLITUS WITHOUT COMPLICATION, WITHOUT LONG-TERM CURRENT USE OF INSULIN: ICD-10-CM

## 2023-06-13 DIAGNOSIS — Z01.812 BLOOD TESTS PRIOR TO TREATMENT OR PROCEDURE: Primary | ICD-10-CM

## 2023-06-13 DIAGNOSIS — C16.9 GASTRIC ADENOCARCINOMA: ICD-10-CM

## 2023-06-13 LAB
ALBUMIN SERPL-MCNC: 4.2 G/DL (ref 3.5–5.2)
ALBUMIN/GLOB SERPL: 1.2 G/DL
ALP SERPL-CCNC: 92 U/L (ref 39–117)
ALT SERPL W P-5'-P-CCNC: 23 U/L (ref 1–41)
ANION GAP SERPL CALCULATED.3IONS-SCNC: 14.1 MMOL/L (ref 5–15)
AST SERPL-CCNC: 20 U/L (ref 1–40)
BASOPHILS # BLD AUTO: 0.04 10*3/MM3 (ref 0–0.2)
BASOPHILS NFR BLD AUTO: 0.3 % (ref 0–1.5)
BILIRUB SERPL-MCNC: 0.4 MG/DL (ref 0–1.2)
BUN SERPL-MCNC: 12 MG/DL (ref 8–23)
BUN/CREAT SERPL: 15.2 (ref 7–25)
CALCIUM SPEC-SCNC: 10 MG/DL (ref 8.6–10.5)
CEA SERPL-MCNC: 4.56 NG/ML
CHLORIDE SERPL-SCNC: 102 MMOL/L (ref 98–107)
CO2 SERPL-SCNC: 23.9 MMOL/L (ref 22–29)
CREAT SERPL-MCNC: 0.79 MG/DL (ref 0.76–1.27)
DEPRECATED RDW RBC AUTO: 44 FL (ref 37–54)
EGFRCR SERPLBLD CKD-EPI 2021: 95 ML/MIN/1.73
EOSINOPHIL # BLD AUTO: 0.14 10*3/MM3 (ref 0–0.4)
EOSINOPHIL NFR BLD AUTO: 1.2 % (ref 0.3–6.2)
ERYTHROCYTE [DISTWIDTH] IN BLOOD BY AUTOMATED COUNT: 14.6 % (ref 12.3–15.4)
FERRITIN SERPL-MCNC: 83.3 NG/ML (ref 30–400)
GLOBULIN UR ELPH-MCNC: 3.4 GM/DL
GLUCOSE SERPL-MCNC: 83 MG/DL (ref 65–99)
HBA1C MFR BLD: 6.5 % (ref 4.8–5.6)
HCT VFR BLD AUTO: 39.3 % (ref 37.5–51)
HGB BLD-MCNC: 13.3 G/DL (ref 13–17.7)
IMM GRANULOCYTES # BLD AUTO: 0.07 10*3/MM3 (ref 0–0.05)
IMM GRANULOCYTES NFR BLD AUTO: 0.6 % (ref 0–0.5)
IRON 24H UR-MRATE: 44 MCG/DL (ref 59–158)
IRON SATN MFR SERPL: 10 % (ref 20–50)
LYMPHOCYTES # BLD AUTO: 2.96 10*3/MM3 (ref 0.7–3.1)
LYMPHOCYTES NFR BLD AUTO: 24.5 % (ref 19.6–45.3)
MCH RBC QN AUTO: 28.4 PG (ref 26.6–33)
MCHC RBC AUTO-ENTMCNC: 33.8 G/DL (ref 31.5–35.7)
MCV RBC AUTO: 83.8 FL (ref 79–97)
MONOCYTES # BLD AUTO: 0.93 10*3/MM3 (ref 0.1–0.9)
MONOCYTES NFR BLD AUTO: 7.7 % (ref 5–12)
NEUTROPHILS NFR BLD AUTO: 65.7 % (ref 42.7–76)
NEUTROPHILS NFR BLD AUTO: 7.92 10*3/MM3 (ref 1.7–7)
NRBC BLD AUTO-RTO: 0 /100 WBC (ref 0–0.2)
PLATELET # BLD AUTO: 269 10*3/MM3 (ref 140–450)
PMV BLD AUTO: 10.1 FL (ref 6–12)
POTASSIUM SERPL-SCNC: 3.7 MMOL/L (ref 3.5–5.2)
PROT SERPL-MCNC: 7.6 G/DL (ref 6–8.5)
RBC # BLD AUTO: 4.69 10*6/MM3 (ref 4.14–5.8)
SODIUM SERPL-SCNC: 140 MMOL/L (ref 136–145)
TIBC SERPL-MCNC: 441 MCG/DL (ref 298–536)
TRANSFERRIN SERPL-MCNC: 296 MG/DL (ref 200–360)
VIT B12 BLD-MCNC: 775 PG/ML (ref 211–946)
WBC NRBC COR # BLD: 12.06 10*3/MM3 (ref 3.4–10.8)

## 2023-06-13 PROCEDURE — 80053 COMPREHEN METABOLIC PANEL: CPT | Performed by: INTERNAL MEDICINE

## 2023-06-13 PROCEDURE — 82728 ASSAY OF FERRITIN: CPT | Performed by: INTERNAL MEDICINE

## 2023-06-13 PROCEDURE — 86334 IMMUNOFIX E-PHORESIS SERUM: CPT | Performed by: INTERNAL MEDICINE

## 2023-06-13 PROCEDURE — 82784 ASSAY IGA/IGD/IGG/IGM EACH: CPT | Performed by: INTERNAL MEDICINE

## 2023-06-13 PROCEDURE — 83521 IG LIGHT CHAINS FREE EACH: CPT | Performed by: INTERNAL MEDICINE

## 2023-06-13 PROCEDURE — 85025 COMPLETE CBC W/AUTO DIFF WBC: CPT | Performed by: INTERNAL MEDICINE

## 2023-06-13 PROCEDURE — 84165 PROTEIN E-PHORESIS SERUM: CPT | Performed by: INTERNAL MEDICINE

## 2023-06-13 PROCEDURE — 84466 ASSAY OF TRANSFERRIN: CPT | Performed by: INTERNAL MEDICINE

## 2023-06-13 PROCEDURE — 36415 COLL VENOUS BLD VENIPUNCTURE: CPT

## 2023-06-13 PROCEDURE — 82378 CARCINOEMBRYONIC ANTIGEN: CPT | Performed by: INTERNAL MEDICINE

## 2023-06-13 PROCEDURE — 83036 HEMOGLOBIN GLYCOSYLATED A1C: CPT | Performed by: INTERNAL MEDICINE

## 2023-06-13 PROCEDURE — 83540 ASSAY OF IRON: CPT | Performed by: INTERNAL MEDICINE

## 2023-06-13 PROCEDURE — 82607 VITAMIN B-12: CPT | Performed by: INTERNAL MEDICINE

## 2023-06-13 RX ORDER — PREDNISONE 1 MG/1
3 TABLET ORAL DAILY
COMMUNITY
Start: 2023-06-07

## 2023-06-13 RX ORDER — PREDNISONE 5 MG/1
2 TABLET ORAL DAILY
COMMUNITY
Start: 2023-06-07

## 2023-06-13 NOTE — PROGRESS NOTES
REASON FOR FOLLOW-UP: Gastric cancer      History of Present Illness         The patient is a 72-year-old male followed by primary care with a history of hypertension, hyperlipidemia, type 2 diabetes, chronic venous stasis, osteoarthritis and gout.  He has been admitted 12/5- 7/2022 with joint pain that was polyarticular with associated edema.  This became quite marked and increasing 20 pounds over 7 days.  His initial studies included hemoglobin 11.4 hematocrit 34.7, sed rate of 39, negative anti-double-stranded DNA, Brambila antigen, RNP, SSA and SSB, normal liver function tests, normal echocardiogram, normal ultrasound of kidneys.  The patient was placed on a 2 g sodium diet, starting of IV diuretics with adjustment of his amlodipine and ropinirole and he did lose approximately 15 pounds.  Studies in around this time included 8/29/2022 with limited abdominal ultrasound showed a small amount of echogenic sludge in the gallbladder, echogenicity liver indicative hepatic steatosis without mass and HIDA scan which was essentially normal.  Renal ultrasound suggested a potential focal cortical defect in the left kidney and follow-up CT abdomen pelvis 1/10/2023 demonstrated no renal calculi, hydronephrosis or suspicious renal mass, diffuse hepatic steatosis and colonic diverticulosis.    The patient later in March required right carpal tunnel release and had been seen by GI (Dr. Reilly) 3/9/2023 undergoing colonoscopy for surveillance with a history of colonic polyps (adenomatous).  After the procedure he did mention upper GI symptoms with nausea left upper abdominal pain and reflux and had previously had upper abdominal symptoms which resolved thought to be gastritis.  His recent radiologic studies did not explain his symptoms and plans were made for EGD through Dr. Reilly.    EGD was scheduled on 4/27/2023 demonstrating normal mucosa in the upper third of the esophagus the middle third and the lower third,  Z-line regular at 40 cm, scattered mild mucosal changes characterized by granularity at the GE junction with biopsy, diffuse mucosal changes with atrophy in the gastric body with biopsies taken and localized severe mucosal change with ulceration found on the posterior wall of the gastric body.  The cardia and gastric fundus were normal retroflexion and no mucosal was found in the entire duodenum with additional biopsies taken.    Pathology results included random duodenal biopsies negative, random gastric biopsy with intestinal metaplasia, negative H. pylori and random lower esophageal biopsy with reactive changes only but gastric ulcer biopsy was consistent with moderately to poorly differentiated adenocarcinoma arising in a background of intestinal metaplasia with high-grade dysplasia, gastric ulcer with necroinflammatory exudate present and subsequent immunostain for HER2 was negative.    The patient's subsequent imaging included CT chest abdomen pelvis showing circumferential thickening of the lesser curvature of the stomach with a central area of ulceration, prominent gastric hepatic ligament and lymph nodes concerning for metastatic disease but no evidence of distant metastatic disease.    The patient is now referred to oncology.  It is not clear that he has been seen by surgery as of yet.    The patient was to be seen in CBC office today but was unable to make the appointment as a result [place] many miles and leading to him having to return to home.  He is contacted by telephone (agrees with the call) recognizing that he truly needs an assessment by PET/CT and possibly a surgical assessment now.  Fortunately his symptoms are relatively well controlled at present.      The patient was more appropriately directed to general surgery initially and seen 5/26/2023 with consideration that he undergo peritoneal washing to evaluate for malignant cells.  This occurred  through Dr. Mckenzie 5/30/2023-EGD and laparoscopy.  Study revealed through a retroflexed view the fundus and ulcerated tumor located along the lesser curvature immediately distal to the GE junction, 4 cm distal to the GE junction.  Washings were negative for definitive for malignancy.    It was concluded that he would need an esophagogastrectomy and the case was discussed with  who favored upfront chemotherapy given neoadjuvantly.  The patient was seen 6/5/2023 and it was determined that endoscopic ultrasound be obtained and if this revealed T1 tumor and he be a good candidate for initial surgery.  The patient is to be seen by Dr. Hodge concerning this.    The patient is seen on 6/13/2023 in office.  We have discussed, in detail with his son present, his current status including the need for EUS, subsequent port placement as we determine his tumor stage.  Available, currently, his PET/CT performed 5/3/2023 showing low-level activity in the short segment of the lesser curvature at the site of his gastric malignancy, otherwise negative with very low-level metabolic activity in the abdomen and shotty nodes in the axilla and both groin regions.    Past Medical History:   Diagnosis Date    BPH (benign prostatic hyperplasia)     Carpal tunnel syndrome     Depression     Diabetes mellitus     Gout     Hyperlipidemia     Hypertension     Neuropathy     Feet    Pulmonary arterial hypertension     Restless legs     Stomach cancer         Past Surgical History:   Procedure Laterality Date    CARPAL TUNNEL RELEASE Left 01/19/2023    Procedure: LEFT CARPAL TUNNEL RELEASE AND SYNOVIAL BIOPSY;  Surgeon: Mikel Dupont MD;  Location: [facility] [place] [facility] MAIN OR;  Service: Hand;  Laterality: Left;    CARPAL TUNNEL RELEASE Right 03/02/2023    Procedure: RIGHT CARPAL TUNNEL RELEASE;  Surgeon: Mikel Dupont MD;  Location: [facility] [place] MAIN OR;  Service: Hand;  Laterality: Right;    COLONOSCOPY N/A 03/09/2023    DIAGNOSTIC LAPAROSCOPY  N/A 5/30/2023    Procedure: DIAGNOSTIC LAPAROSCOPY with peritoneal washing;  Surgeon: Tito Mckenzie MD;  Location:  [facility] MAIN OR;  Service: General;  Laterality: N/A;    ENDOSCOPY N/A 04/27/2023    Dr. Reilly    ENDOSCOPY N/A 5/30/2023    Procedure: ESOPHAGOGASTRODUODENOSCOPY;  Surgeon: Tito Mckenzie MD;  Location:  [facility] MAIN OR;  Service: General;  Laterality: N/A;    HAND SURGERY Left     KNEE ARTHROSCOPY Bilateral     TOTAL KNEE ARTHROPLASTY Right 02/21/2018        Current Outpatient Medications on File Prior to Visit   Medication Sig Dispense Refill    allopurinol (ZYLOPRIM) 300 MG tablet Take 1 tablet by mouth Daily.      amLODIPine (NORVASC) 5 MG tablet Take 1 tablet by mouth Daily. 90 tablet 3    CBD (cannabidiol) oral oil Take  by mouth. gummies      DULoxetine (CYMBALTA) 60 MG capsule Take 1 capsule by mouth Daily.      furosemide (Lasix) 40 MG tablet Take 1 tablet by mouth 2 (Two) Times a Day. 30 tablet 3    lisinopril (PRINIVIL,ZESTRIL) 40 MG tablet Take 1 tablet by mouth Daily.      metFORMIN (GLUCOPHAGE) 1000 MG tablet Take 1 tablet by mouth 2 (Two) Times a Day With Meals.      metoprolol succinate XL (TOPROL-XL) 50 MG 24 hr tablet Take 1 tablet by mouth Daily. 30 tablet 3    omeprazole (priLOSEC) 20 MG capsule Take 1 capsule by mouth Daily. 30 capsule 3    ondansetron (Zofran) 4 MG tablet Take 1 tablet by mouth Every 6 (Six) Hours As Needed for Nausea or Vomiting for up to 10 doses. 10 tablet 0    potassium chloride (K-DUR,KLOR-CON) 20 MEQ CR tablet Take 1 tablet by mouth 2 (Two) Times a Day. 60 tablet 5    predniSONE (DELTASONE) 1 MG tablet Take 3 tablets by mouth Daily.      predniSONE (DELTASONE) 5 MG tablet Take 2 tablets by mouth Daily.      rOPINIRole (REQUIP) 2 MG tablet Take 1 tablet by mouth Every 12 (Twelve) Hours. 60 tablet 3    tamsulosin (FLOMAX) 0.4 MG capsule 24 hr capsule Take 1 capsule by mouth Daily.      valsartan (DIOVAN) 160 MG tablet Take 1 tablet by mouth Daily. 90  "tablet 3     No current facility-administered medications on file prior to visit.        ALLERGIES:  No Known Allergies     Social History     Socioeconomic History    Marital status:    Tobacco Use    Smoking status: Never     Passive exposure: Never    Smokeless tobacco: Never   Vaping Use    Vaping Use: Never used   Substance and Sexual Activity    Alcohol use: Yes     Comment: once a month  one  Esha/with Mexican Dinner    Drug use: Yes     Types: Marijuana     Comment: thc gummies for sleep    Sexual activity: Not Currently     Partners: Female     Birth control/protection: Vasectomy        No family history on file.     Review of Systems   Constitutional:  Positive for activity change and fatigue.   HENT: Negative.     Eyes: Negative.    Respiratory: Negative.     Cardiovascular: Negative.    Gastrointestinal:  Positive for abdominal pain.   Genitourinary: Negative.    Musculoskeletal:  Positive for arthralgias.   Skin: Negative.    Allergic/Immunologic: Negative.    Neurological:  Positive for numbness (Patient describes 30 years of peripheral neuropathy-severe).   Psychiatric/Behavioral: Negative.        Objective     Vitals:    06/13/23 1327   BP: 157/77   Pulse: 90   Resp: 18   Temp: 98.2 °F (36.8 °C)   TempSrc: Temporal   SpO2: 99%   Weight: 93.5 kg (206 lb 1.6 oz)   Height: 172.7 cm (67.99\")   PainSc: 0-No pain         6/13/2023     1:28 PM   Current Status   ECOG score 0       Physical Exam  Constitutional:       Appearance: Normal appearance. He is normal weight.   HENT:      Head: Normocephalic and atraumatic.      Nose: Nose normal.      Mouth/Throat:      Mouth: Mucous membranes are moist.      Pharynx: Oropharynx is clear.   Eyes:      Extraocular Movements: Extraocular movements intact.      Conjunctiva/sclera: Conjunctivae normal.      Pupils: Pupils are equal, round, and reactive to light.   Cardiovascular:      Rate and Rhythm: Normal rate and regular rhythm.      Pulses: Normal " pulses.      Heart sounds: Normal heart sounds.   Pulmonary:      Effort: Pulmonary effort is normal.      Breath sounds: Normal breath sounds.   Abdominal:      General: Bowel sounds are normal.      Palpations: Abdomen is soft.   Musculoskeletal:         General: Normal range of motion.      Cervical back: Normal range of motion and neck supple.      Comments: Status post knee replacement   Skin:     General: Skin is warm and dry.   Neurological:      General: No focal deficit present.      Mental Status: He is oriented to person, place, and time.   Psychiatric:         Mood and Affect: Mood normal.         Behavior: Behavior normal.         RECENT LABS:  Hematology WBC   Date Value Ref Range Status   06/13/2023 12.06 (H) 3.40 - 10.80 10*3/mm3 Final   05/26/2021 7.73 4.5 - 11.0 10*3/uL Final     RBC   Date Value Ref Range Status   06/13/2023 4.69 4.14 - 5.80 10*6/mm3 Final   05/26/2021 4.94 4.5 - 5.9 10*6/uL Final     Hemoglobin   Date Value Ref Range Status   06/13/2023 13.3 13.0 - 17.7 g/dL Final   03/09/2023 14.4 13.7 - 17.5 Gram/dL Final   05/26/2021 15.9 13.5 - 17.5 g/dL Final     Hematocrit   Date Value Ref Range Status   06/13/2023 39.3 37.5 - 51.0 % Final   03/09/2023 41.2 40.1 - 51.0 % Final     Platelets   Date Value Ref Range Status   06/13/2023 269 140 - 450 10*3/mm3 Final   05/26/2021 208 140 - 440 10*3/uL Final          Assessment & Plan       72-year-old male followed by primary care with hypertension, hyperlipidemia, type 2 diabetes, chronic venous stasis osteoarthritis and gout.  He had developed polyarticular joint pain in December and required hospitalization with diet alteration and IV diuretics.  He did improve fortunately but began to have abdominal discomfort thereafter leading to assessment of gallbladder, renal ultrasound with a potential cortical defect left kidney and a follow-up CT scan of abdomen pelvis that revealed hepatic steatosis and colonic diverticulosis.       The patient was  seen by GI for screening colonoscopy and surveillance of adenomatous colonic polyps.  At this point he is having upper abdominal symptoms thought to be gastritis though EGD was performed 4/27/2023 ultimately revealing an ulceration found on the posterior wall of the gastric body.  Biopsy was positive for poorly differentiated adenocarcinoma arising in a background of intestinal metaplasia and high-grade dysplasia, immunostain HER2 negative.  The patient's subsequent CT scan of chest and pelvis showed circumferential thickening of the lesser curvature of the stomach with a central area of ulceration and prominent gastrohepatic ligament lymphadenopathy concerning metastatic disease but no clear evidence of distant metastatic disease.    The patient is contacted by telephone 5/18/2023 and we plan to proceed with PET/CT next available, surgical assessment and follow-up in in CBC office subsequently.    The patient was more appropriately directed to general surgery initially and seen 5/26/2023 with consideration that he undergo peritoneal washing to evaluate for malignant cells.  This occurred through Dr. Mckenzie 5/30/2023-EGD and laparoscopy.  Study revealed through a retroflexed view the fundus and ulcerated tumor located along the lesser curvature immediately distal to the GE junction, 4 cm distal to the GE junction.  Washings were negative for definitive for malignancy.    It was concluded that he would need an esophagogastrectomy and the case was discussed with  who favored upfront chemotherapy given neoadjuvantly.  The patient was seen 6/5/2023 and it was determined that endoscopic ultrasound be obtained and if this revealed T1 tumor and he be a good candidate for initial surgery.  The patient is to be seen by Dr. Hodge concerning this.    The patient is seen on 6/13/2023 in office.  We have discussed, in detail with his son present, his current status including the need for EUS, subsequent port placement as  we determine his tumor stage.  Available, currently, his PET/CT performed 5/3/2023 showing low-level activity in the short segment of the lesser curvature at the site of his gastric malignancy, otherwise negative with very low-level metabolic activity in the abdomen and shotty nodes in the axilla and both groin regions.    Plan:  *The patient is now scheduled for EUS next week with results pending.  Pending the T stage he could well be a candidate for neoadjuvant chemotherapy but we find now that his peripheral neuropathy is both severe and longstanding and thus the use of certain chemotherapy regimens such as FLOT (frequently used) is of concern since peripheral neuropathy risk could be quite high.  Alternative FOLFOX could be utilized with oxaliplatin dose adjusted pending his degree of neuropathic symptoms.    *Return to laboratory today for CEA, B12, ferritin, iron profile, hemoglobin A1c, IAN, PE, free serum light chain    *Teach FOLFOX next week    *Patient to proceed with above plans including EUS and case discussed with  as to potential timing for port placement shortly thereafter if needed    *2-week follow-up MD possible additional FOLFOX.    *Potentially additional chemotherapy with FOLFOX, subsequent surgery, adjuvant FOLFOX     *Patient agreeable this plan and follow-up      *Dr. Varela has contacted me indicating that EUS is scheduled 6/21/2023 and he will schedule [employer] 6/23/2023.

## 2023-06-13 NOTE — LETTER
June 13, 2023     Phani Conn MD  [address]  [address]  [facility] [address]    Patient: Mark Ken   YOB: 1950   Date of Visit: 6/13/2023       Dear Dr. Fabien MD:    Thank you for referring Mark Ken to me for evaluation. Below are the relevant portions of my assessment and plan of care.    If you have questions, please do not hesitate to call me. I look forward to following Mark along with you.         Sincerely,        Mikel Ludwig MD        CC: MD Kavon Mcfarlane MD Nabeel Gul, MD Kommor, Mikel MENDOZA MD  06/13/23 3995  Sign when Signing Visit        REASON FOR FOLLOW-UP: Gastric cancer      History of Present Illness         The patient is a 72-year-old male followed by primary care with a history of hypertension, hyperlipidemia, type 2 diabetes, chronic venous stasis, osteoarthritis and gout.  He has been admitted 12/5- 7/2022 with joint pain that was polyarticular with associated edema.  This became quite marked and increasing 20 pounds over 7 days.  His initial studies included hemoglobin 11.4 hematocrit 34.7, sed rate of 39, negative anti-double-stranded DNA, Brambila antigen, RNP, SSA and SSB, normal liver function tests, normal echocardiogram, normal ultrasound of kidneys.  The patient was placed on a 2 g sodium diet, starting of IV diuretics with adjustment of his amlodipine and ropinirole and he did lose approximately 15 pounds.  Studies in around this time included 8/29/2022 with limited abdominal ultrasound showed a small amount of echogenic sludge in the gallbladder, echogenicity liver indicative hepatic steatosis without mass and HIDA scan which was essentially normal.  Renal ultrasound suggested a potential focal cortical defect in the left kidney and follow-up CT abdomen pelvis 1/10/2023 demonstrated no renal calculi, hydronephrosis or suspicious renal mass, diffuse hepatic steatosis and colonic diverticulosis.    The patient  later in March required right carpal tunnel release and had been seen by GI (Dr. Reilly) 3/9/2023 undergoing colonoscopy for surveillance with a history of colonic polyps (adenomatous).  After the procedure he did mention upper GI symptoms with nausea left upper abdominal pain and reflux and had previously had upper abdominal symptoms which resolved thought to be gastritis.  His recent radiologic studies did not explain his symptoms and plans were made for EGD through Dr. Reilly.    EGD was scheduled on 4/27/2023 demonstrating normal mucosa in the upper third of the esophagus the middle third and the lower third, Z-line regular at 40 cm, scattered mild mucosal changes characterized by granularity at the GE junction with biopsy, diffuse mucosal changes with atrophy in the gastric body with biopsies taken and localized severe mucosal change with ulceration found on the posterior wall of the gastric body.  The cardia and gastric fundus were normal retroflexion and no mucosal was found in the entire duodenum with additional biopsies taken.    Pathology results included random duodenal biopsies negative, random gastric biopsy with intestinal metaplasia, negative H. pylori and random lower esophageal biopsy with reactive changes only but gastric ulcer biopsy was consistent with moderately to poorly differentiated adenocarcinoma arising in a background of intestinal metaplasia with high-grade dysplasia, gastric ulcer with necroinflammatory exudate present and subsequent immunostain for HER2 was negative.    The patient's subsequent imaging included CT chest abdomen pelvis showing circumferential thickening of the lesser curvature of the stomach with a central area of ulceration, prominent gastric hepatic ligament and lymph nodes concerning for metastatic disease but no evidence of distant metastatic disease.    The patient is now referred to oncology.  It is not clear that he has been seen by surgery as of  yet.    The patient was to be seen in [facility] office today but was unable to make the appointment as a result of [place] on [place] express[place] path for many miles and leading to him having to return to home.  He is contacted by telephone (agrees with the call) recognizing that he truly needs an assessment by PET/CT and possibly a surgical assessment now.  Fortunately his symptoms are relatively well controlled at present.      The patient was more appropriately directed to general surgery initially and seen 5/26/2023 with consideration that he undergo peritoneal washing to evaluate for malignant cells.  This occurred through Dr. Mckenzie 5/30/2023-EGD and laparoscopy.  Study revealed through a retroflexed view the fundus and ulcerated tumor located along the lesser curvature immediately distal to the GE junction, 4 cm distal to the GE junction.  Washings were negative for definitive for malignancy.    It was concluded that he would need an esophagogastrectomy and the case was discussed with  who favored upfront chemotherapy given neoadjuvantly.  The patient was seen 6/5/2023 and it was determined that endoscopic ultrasound be obtained and if this revealed T1 tumor and he be a good candidate for initial surgery.  The patient is to be seen by Dr. Hodge concerning this.    The patient is seen on 6/13/2023 in office.  We have discussed, in detail with his son present, his current status including the need for EUS, subsequent port placement as we determine his tumor stage.  Available, currently, his PET/CT performed 5/3/2023 showing low-level activity in the short segment of the lesser curvature at the site of his gastric malignancy, otherwise negative with very low-level metabolic activity in the abdomen and shotty nodes in the axilla and both groin regions.    Past Medical History:   Diagnosis Date   • BPH (benign prostatic hyperplasia)    • Carpal tunnel syndrome    • Depression    • Diabetes mellitus    •  Gout    • Hyperlipidemia    • Hypertension    • Neuropathy     Feet   • Pulmonary arterial hypertension    • Restless legs    • Stomach cancer         Past Surgical History:   Procedure Laterality Date   • CARPAL TUNNEL RELEASE Left 01/19/2023    Procedure: LEFT CARPAL TUNNEL RELEASE AND SYNOVIAL BIOPSY;  Surgeon: Mikel Dupont MD;  Location: [facility] MAIN OR;  Service: Hand;  Laterality: Left;   • CARPAL TUNNEL RELEASE Right 03/02/2023    Procedure: RIGHT CARPAL TUNNEL RELEASE;  Surgeon: Mikel Dupont MD;  Location: [facility] MAIN OR;  Service: Hand;  Laterality: Right;   • COLONOSCOPY N/A 03/09/2023   • DIAGNOSTIC LAPAROSCOPY N/A 5/30/2023    Procedure: DIAGNOSTIC LAPAROSCOPY with peritoneal washing;  Surgeon: Tito Mckenzie MD;  Location: [facility] MAIN OR;  Service: General;  Laterality: N/A;   • ENDOSCOPY N/A 04/27/2023    Dr. Reilly   • ENDOSCOPY N/A 5/30/2023    Procedure: ESOPHAGOGASTRODUODENOSCOPY;  Surgeon: Tito Mckenzie MD;  Location: [facility] OR;  Service: General;  Laterality: N/A;   • HAND SURGERY Left    • KNEE ARTHROSCOPY Bilateral    • TOTAL KNEE ARTHROPLASTY Right 02/21/2018        Current Outpatient Medications on File Prior to Visit   Medication Sig Dispense Refill   • allopurinol (ZYLOPRIM) 300 MG tablet Take 1 tablet by mouth Daily.     • amLODIPine (NORVASC) 5 MG tablet Take 1 tablet by mouth Daily. 90 tablet 3   • CBD (cannabidiol) oral oil Take  by mouth. gummies     • DULoxetine (CYMBALTA) 60 MG capsule Take 1 capsule by mouth Daily.     • furosemide (Lasix) 40 MG tablet Take 1 tablet by mouth 2 (Two) Times a Day. 30 tablet 3   • lisinopril (PRINIVIL,ZESTRIL) 40 MG tablet Take 1 tablet by mouth Daily.     • metFORMIN (GLUCOPHAGE) 1000 MG tablet Take 1 tablet by mouth 2 (Two) Times a Day With Meals.     • metoprolol succinate XL (TOPROL-XL) 50 MG 24 hr tablet Take 1 tablet by mouth Daily. 30 tablet 3   • omeprazole (priLOSEC) 20 MG capsule Take 1 capsule by mouth Daily.  30 capsule 3   • ondansetron (Zofran) 4 MG tablet Take 1 tablet by mouth Every 6 (Six) Hours As Needed for Nausea or Vomiting for up to 10 doses. 10 tablet 0   • potassium chloride (K-DUR,KLOR-CON) 20 MEQ CR tablet Take 1 tablet by mouth 2 (Two) Times a Day. 60 tablet 5   • predniSONE (DELTASONE) 1 MG tablet Take 3 tablets by mouth Daily.     • predniSONE (DELTASONE) 5 MG tablet Take 2 tablets by mouth Daily.     • rOPINIRole (REQUIP) 2 MG tablet Take 1 tablet by mouth Every 12 (Twelve) Hours. 60 tablet 3   • tamsulosin (FLOMAX) 0.4 MG capsule 24 hr capsule Take 1 capsule by mouth Daily.     • valsartan (DIOVAN) 160 MG tablet Take 1 tablet by mouth Daily. 90 tablet 3     No current facility-administered medications on file prior to visit.        ALLERGIES:  No Known Allergies     Social History     Socioeconomic History   • Marital status:    Tobacco Use   • Smoking status: Never     Passive exposure: Never   • Smokeless tobacco: Never   Vaping Use   • Vaping Use: Never used   Substance and Sexual Activity   • Alcohol use: Yes     Comment: once a month  one  Esha/with Mexican Dinner   • Drug use: Yes     Types: Marijuana     Comment: thc gummies for sleep   • Sexual activity: Not Currently     Partners: Female     Birth control/protection: Vasectomy        No family history on file.     Review of Systems   Constitutional:  Positive for activity change and fatigue.   HENT: Negative.     Eyes: Negative.    Respiratory: Negative.     Cardiovascular: Negative.    Gastrointestinal:  Positive for abdominal pain.   Genitourinary: Negative.    Musculoskeletal:  Positive for arthralgias.   Skin: Negative.    Allergic/Immunologic: Negative.    Neurological:  Positive for numbness (Patient describes 30 years of peripheral neuropathy-severe).   Psychiatric/Behavioral: Negative.        Objective    Vitals:    06/13/23 1327   BP: 157/77   Pulse: 90   Resp: 18   Temp: 98.2 °F (36.8 °C)   TempSrc: Temporal   SpO2: 99%  "  Weight: 93.5 kg (206 lb 1.6 oz)   Height: 172.7 cm (67.99\")   PainSc: 0-No pain         6/13/2023     1:28 PM   Current Status   ECOG score 0       Physical Exam  Constitutional:       Appearance: Normal appearance. He is normal weight.   HENT:      Head: Normocephalic and atraumatic.      Nose: Nose normal.      Mouth/Throat:      Mouth: Mucous membranes are moist.      Pharynx: Oropharynx is clear.   Eyes:      Extraocular Movements: Extraocular movements intact.      Conjunctiva/sclera: Conjunctivae normal.      Pupils: Pupils are equal, round, and reactive to light.   Cardiovascular:      Rate and Rhythm: Normal rate and regular rhythm.      Pulses: Normal pulses.      Heart sounds: Normal heart sounds.   Pulmonary:      Effort: Pulmonary effort is normal.      Breath sounds: Normal breath sounds.   Abdominal:      General: Bowel sounds are normal.      Palpations: Abdomen is soft.   Musculoskeletal:         General: Normal range of motion.      Cervical back: Normal range of motion and neck supple.      Comments: Status post knee replacement   Skin:     General: Skin is warm and dry.   Neurological:      General: No focal deficit present.      Mental Status: He is oriented to person, place, and time.   Psychiatric:         Mood and Affect: Mood normal.         Behavior: Behavior normal.         RECENT LABS:  Hematology WBC   Date Value Ref Range Status   06/13/2023 12.06 (H) 3.40 - 10.80 10*3/mm3 Final   05/26/2021 7.73 4.5 - 11.0 10*3/uL Final     RBC   Date Value Ref Range Status   06/13/2023 4.69 4.14 - 5.80 10*6/mm3 Final   05/26/2021 4.94 4.5 - 5.9 10*6/uL Final     Hemoglobin   Date Value Ref Range Status   06/13/2023 13.3 13.0 - 17.7 g/dL Final   03/09/2023 14.4 13.7 - 17.5 Gram/dL Final   05/26/2021 15.9 13.5 - 17.5 g/dL Final     Hematocrit   Date Value Ref Range Status   06/13/2023 39.3 37.5 - 51.0 % Final   03/09/2023 41.2 40.1 - 51.0 % Final     Platelets   Date Value Ref Range Status   06/13/2023 " 269 140 - 450 10*3/mm3 Final   05/26/2021 208 140 - 440 10*3/uL Final          Assessment & Plan      72-year-old male followed by primary care with hypertension, hyperlipidemia, type 2 diabetes, chronic venous stasis osteoarthritis and gout.  He had developed polyarticular joint pain in December and required hospitalization with diet alteration and IV diuretics.  He did improve fortunately but began to have abdominal discomfort thereafter leading to assessment of gallbladder, renal ultrasound with a potential cortical defect left kidney and a follow-up CT scan of abdomen pelvis that revealed hepatic steatosis and colonic diverticulosis.       The patient was seen by GI for screening colonoscopy and surveillance of adenomatous colonic polyps.  At this point he is having upper abdominal symptoms thought to be gastritis though EGD was performed 4/27/2023 ultimately revealing an ulceration found on the posterior wall of the gastric body.  Biopsy was positive for poorly differentiated adenocarcinoma arising in a background of intestinal metaplasia and high-grade dysplasia, immunostain HER2 negative.  The patient's subsequent CT scan of chest and pelvis showed circumferential thickening of the lesser curvature of the stomach with a central area of ulceration and prominent gastrohepatic ligament lymphadenopathy concerning metastatic disease but no clear evidence of distant metastatic disease.    The patient is contacted by telephone 5/18/2023 and we plan to proceed with PET/CT next available, surgical assessment and follow-up in in [facility] office subsequently.    The patient was more appropriately directed to general surgery initially and seen 5/26/2023 with consideration that he undergo peritoneal washing to evaluate for malignant cells.  This occurred through Dr. Mckenzie 5/30/2023-EGD and laparoscopy.  Study revealed through a retroflexed view the fundus and ulcerated tumor located along the lesser curvature immediately  distal to the GE junction, 4 cm distal to the GE junction.  Washings were negative for definitive for malignancy.    It was concluded that he would need an esophagogastrectomy and the case was discussed with  who favored upfront chemotherapy given neoadjuvantly.  The patient was seen 6/5/2023 and it was determined that endoscopic ultrasound be obtained and if this revealed T1 tumor and he be a good candidate for initial surgery.  The patient is to be seen by Dr. Hodge concerning this.    The patient is seen on 6/13/2023 in office.  We have discussed, in detail with his son present, his current status including the need for EUS, subsequent port placement as we determine his tumor stage.  Available, currently, his PET/CT performed 5/3/2023 showing low-level activity in the short segment of the lesser curvature at the site of his gastric malignancy, otherwise negative with very low-level metabolic activity in the abdomen and shotty nodes in the axilla and both groin regions.    Plan:  *The patient is now scheduled for EUS next week with results pending.  Pending the T stage he could well be a candidate for neoadjuvant chemotherapy but we find now that his peripheral neuropathy is both severe and longstanding and thus the use of certain chemotherapy regimens such as FLOT (frequently used) is of concern since peripheral neuropathy risk could be quite high.  Alternative FOLFOX could be utilized with oxaliplatin dose adjusted pending his degree of neuropathic symptoms.    *Return to laboratory today for CEA, B12, ferritin, iron profile, hemoglobin A1c, IAN, PE, free serum light chain    *Teach FOLFOX next week    *Patient to proceed with above plans including EUS and case discussed with  as to potential timing for port placement shortly thereafter if needed    *2-week follow-up MD possible additional FOLFOX.    *Potentially additional chemotherapy with FOLFOX, subsequent surgery, adjuvant FOLFOX     *Patient  agreeable this plan and follow-up

## 2023-06-14 LAB
ALBUMIN SERPL ELPH-MCNC: 3.9 G/DL (ref 2.9–4.4)
ALBUMIN/GLOB SERPL: 1.3 {RATIO} (ref 0.7–1.7)
ALPHA1 GLOB SERPL ELPH-MCNC: 0.2 G/DL (ref 0–0.4)
ALPHA2 GLOB SERPL ELPH-MCNC: 1 G/DL (ref 0.4–1)
B-GLOBULIN SERPL ELPH-MCNC: 1.1 G/DL (ref 0.7–1.3)
GAMMA GLOB SERPL ELPH-MCNC: 0.8 G/DL (ref 0.4–1.8)
GLOBULIN SER-MCNC: 3.1 G/DL (ref 2.2–3.9)
IGA SERPL-MCNC: 242 MG/DL (ref 61–437)
IGG SERPL-MCNC: 806 MG/DL (ref 603–1613)
IGM SERPL-MCNC: 40 MG/DL (ref 15–143)
INTERPRETATION SERPL IEP-IMP: NORMAL
KAPPA LC FREE SER-MCNC: 17.4 MG/L (ref 3.3–19.4)
KAPPA LC FREE/LAMBDA FREE SER: 1.57 {RATIO} (ref 0.26–1.65)
LABORATORY COMMENT REPORT: NORMAL
LAMBDA LC FREE SERPL-MCNC: 11.1 MG/L (ref 5.7–26.3)
M PROTEIN SERPL ELPH-MCNC: NORMAL G/DL
PROT SERPL-MCNC: 7 G/DL (ref 6–8.5)

## 2023-06-16 ENCOUNTER — OFFICE VISIT (OUTPATIENT)
Dept: ONCOLOGY | Facility: CLINIC | Age: 73
End: 2023-06-16
Payer: MEDICARE

## 2023-06-16 VITALS
RESPIRATION RATE: 18 BRPM | HEART RATE: 83 BPM | HEIGHT: 68 IN | TEMPERATURE: 98.6 F | DIASTOLIC BLOOD PRESSURE: 91 MMHG | BODY MASS INDEX: 31.08 KG/M2 | WEIGHT: 205.1 LBS | SYSTOLIC BLOOD PRESSURE: 172 MMHG | OXYGEN SATURATION: 97 %

## 2023-06-16 DIAGNOSIS — C16.9 GASTRIC ADENOCARCINOMA: Primary | ICD-10-CM

## 2023-06-16 RX ORDER — ONDANSETRON HYDROCHLORIDE 8 MG/1
8 TABLET, FILM COATED ORAL 3 TIMES DAILY PRN
Qty: 30 TABLET | Refills: 5 | Status: SHIPPED | OUTPATIENT
Start: 2023-06-16

## 2023-06-16 NOTE — PROGRESS NOTES
TREATMENT  PREPARATION    Mark Ken  [id number]  1950    Chief Complaint: Treatment preparation and needs assessment    History of present illness:  Mark Ken is a 72 y.o. year old male who is here today for treatment preparation and needs assessment.  The patient has been diagnosed with   Encounter Diagnosis   Name Primary?    Gastric adenocarcinoma Yes    and is scheduled to begin treatment with:     Oncology History:    Oncology/Hematology History   Gastric adenocarcinoma   5/26/2023 Initial Diagnosis    Gastric adenocarcinoma     6/27/2023 -  Chemotherapy    OP GE [id number] OXALIplatin / Leucovorin / Fluorouracil           The current medication list and allergy list were reviewed and reconciled.     Past Medical History, Past Surgical History, Social History, Family History have been reviewed and are without significant changes except as mentioned.    Physical Exam:    Vitals:    06/16/23 1409   BP: 172/91   Pulse: 83   Resp: 18   Temp: 98.6 °F (37 °C)   SpO2: 97%     Vitals:    06/16/23 1409   PainSc: 0-No pain        ECOG score: 0             Physical Exam  HENT:      Head: Normocephalic and atraumatic.   Eyes:      Extraocular Movements: Extraocular movements intact.      Conjunctiva/sclera: Conjunctivae normal.   Pulmonary:      Effort: Pulmonary effort is normal. No respiratory distress.   Neurological:      General: No focal deficit present.      Mental Status: He is alert and oriented to person, place, and time.   Psychiatric:         Mood and Affect: Mood normal.         Behavior: Behavior normal.         NEEDS ASSESSMENTS    Genetics  The patient's new diagnosis and family history have been reviewed for genetic counseling needs. The patient will not be referred..     Psychosocial and Barriers to care  The patient has completed a PHQ-9 Depression Screening and the Distress Thermometer (DT) today.  PHQ-9 results show PHQ-2 Total Score: 0 PHQ-9 Total Score: PHQ-9 Total Score: 0     The  patient scored their distress today as Distress Level: 0-No distress on a scale of 0-10 with 0 being no distress and 10 being extreme distress. Problems marked by the patient as being an issue for them within the last week include   .      Results were reviewed along with psychosocial resources offered by our cancer center.  Our Supportive Oncology team will be flagged for a score of 4 or above, and a same day call will be made for a score of 9 or 10.  A mental health referral is offered at that time. Patients who score less than 4 have been educated on our support services and can be referred to our  upon request.  The patient will not be referred to our .       Nutrition  The patient has completed the malnutrition screening today. They scored Malnutrition Screening Tool  Have you recently lost weight without trying?  If yes, how much weight have you lost?: 0--> No  Have you been eating poorly because of a decreased appetite?: 0--> No  MST score: 0   with a score of 0-1 meaning not at risk in a score of 2 or greater meaning at risk.  Patients with a score of 3 or higher will be referred to our oncology dietitian for support. Patients beginning at risk treatment regimens or who have dietary concerns will also be referred to our oncology dietitian. The patient will not be referred.    Functional Assessment  Persons who are age 70 or greater will be screened for qualification of a comprehensive geriatric assessment by our survivorship nurse practitioner.  Older adults with cancer face unique challenges. These may include an increased risk of drug reactions, financial burdens, and caregiver stress. The patient scored   . Patients scoring 14 or lower will referred for an older adult functional assessment with the survivorship advanced practice registered nurse to ensure all needed support is provided as patients plan for their treatments. NOT APPLICABLE    Intravenous Access Assessment  The  "patient and I discussed planned intravenous chemo/biotherapy as well as other IV treatments that are often needed throughout the course of treatment. These may include, but are not limited to blood transfusions, antibiotics, and IV hydration. Discussed that depending on selected treatment and vein assessment, patient may require venous access device (VAD) which could include but not limited to a Mediport or PICC line. Risks and benefits of VADs reviewed. The patient will be treated via Port.    Reproductive/Sexual Activity   People should avoid becoming pregnant and should not get a partner pregnant while undergoing chemo/biotherapy.  People of childbearing age should use effective contraception during active therapy. The best recommendation for all people is to use a barrier method for a minimum of 1 week after the last infusion of chemo/biotherapy to prevent your partner being exposed to byproducts from treatment medications in bodily fluids. Effective contraception should be discussed with your oncology team to make sure it is safe to take based on your diagnosis. Possible options include oral contraceptives, barrier methods. Chemo/biotherapy can change your ability to reproduce children in the future.  There are options for fertility preservation. NOT APPLICABLE    Advanced Care Planning  Advance Care Planning   The patient and I discussed advanced care planning, \"Conversations that Matter\".   This service is offered for development of advance directives with a certified ACP facilitator.  The patient does not have an up-to-date advanced directive. This document is not on file with our office. The patient is not interested in an appointment with one of our facilitators to create or update their advanced directives.    Have you reviewed your Advance Directive and is it valid for this stay?: Not applicable  Patient Requests Assistance on Advance Directives: Patient Declined          Smoking cessation  Tobacco Use: " Low Risk     Smoking Tobacco Use: Never    Smokeless Tobacco Use: Never    Passive Exposure: Never       Patient and I discussed their tobacco use history. Referral will not be made for smoking cessation.      Palliative Care  When appropriate, the patient and I discussed the availability palliative care services and when appropriate Hospice care. Palliative care is not the same as Hospice care which was explained to the patient.NOT APPLICABLE.    Survivorship   When appropriate, we discussed that we will refer the patient to survivorship clinic to discuss next steps following completion of planned treatment.  Reviewed this visit will include assessment of your physical, psychological, functional, and spiritual needs as a survivor and the need at attend this visit when scheduled.    TREATMENT EDUCATION    Today I met with the patient to discuss the chemo/biotherapy regimen recommended for treatment of Gastric adenocarcinoma  - Provider Communication  - ondansetron (ZOFRAN) 8 MG tablet  .  The patient was given explanation of treatment premed side effects including office policy that prohibits patients to drive if sedating medications are administered, MD explanation given regarding benefits, side effects, toxicities and goals of treatment.  The patient received a Chemotherapy/Biotherapy Plan Summary including diagnosis and explanation of specific treatment plan.    SIDE EFFECTS:  Common side effects were discussed with the patient and/or significant other.  Discussion included where applicable hair loss/discoloration, anemia/fatigue, infection/chills/fever, appetite, bleeding risk/precautions, constipation, diarrhea, mouth sores, taste alteration, loss of appetite, nausea/vomiting, peripheral neuropathy, skin/nail changes, rash, muscle aches/weakness, photosensitivity, weight gain/loss, hearing loss, dizziness, menopausal symptoms, menstrual irregularity, sterility, high blood pressure, heart damage, liver damage,  lung damage, kidney damage, DVT/PE risk, fluid retention, pleural/pericardial effusion, somnolence, electrolyte/LFT imbalance, vein exercises and/or the possible need for vascular access/port placement.  The patient was advised that although uncommon, leakage of an infused medication from the vein or venous access device may lead to skin breakdown and/or other tissue damage.  The patient was advised that he/she may have pain, bleeding, and/or bruising from the insertion of a needle in their vein or venous access device (port).  The patient was further advised that, in spite of proper technique, infection with redness and irritation may rarely occur at the site where the needle was inserted.  The patient was advised that if complications occur, additional medical treatment is available.  Finally, where applicable we have reviewed rare but potential immune mediated side effects including shortness of breath, cough, chest pain (pneumonitis), abdominal pain, diarrhea (colitis), thyroiditis (hypothyroid or hyperthyroid), hepatitis and liver dysfunction, nephritis and renal dysfunction.    Discussion also included side effects specific to drugs in the treatment plan, specifically:    Treatment Plans       Name Type Plan Dates Plan Provider         Active    OP GE mFOLFOX6 OXALIplatin / Leucovorin / Fluorouracil ONCOLOGY TREATMENT  6/26/2023 - Present Mikel Ludwig MD                      Questions answered and additional information discussed on topics including:  Anemia, Thrombocytopenia, Neutropenia, Nutrition and appetite changes, Constipation, Diarrhea, Nausea & vomiting, Mouth sores, Alopecia, Nervous system changes, Pain, Skin & nail changes, Organ toxicities, and Home care       Assessment and Plan:    Diagnoses and all orders for this visit:    1. Gastric adenocarcinoma (Primary)  -     Provider Communication  -     ondansetron (ZOFRAN) 8 MG tablet; Take 1 tablet by mouth 3 (Three) Times a Day As Needed for  Nausea or Vomiting.  Dispense: 30 tablet; Refill: 5      Orders Placed This Encounter   Procedures    Provider Communication     Consider the addition of an NK-1 medication for patients at higher risk for CINV including those that are less than 50 years of age, female, history of prior chemotherapy, history of morning sickness with pregnancy, history of motion sickness and/or low or no alcohol consumption.         The patient and I have reviewed their diagnosis and scheduled treatment plan. Needs assessment was completed where applicable including genetics, psychosocial needs, barriers to care, VAD evaluation, advanced care planning, survivorship, and palliative care services where indicated. Referrals have been ordered as appropriate based upon evaluation today and patient desires.   Chemo/biotherapy teaching was completed today and consent obtained. See separate documentation for further details.  Adequate time was given to answer questions.  Patient made aware of their care team members and contact information if they have questions or problems throughout the treatment course.  Discussion held and written information provided describing frequency of office visits and ongoing monitoring throughout the treatment plan.     Reviewed with patient any prescribed medication sent to pharmacy.  Education provided regarding proper storage, safe handling, and proper disposal of unused medication.  Proper handling of body fluids and waste discussed and written information provided.  If appropriate, patient had pretreatment labs drawn today.    Learning assessment completed at initial patient encounter. See separate flowsheet. Chemo/biotherapy education comprehension assessed at today's visit.    I spent 60 minutes caring for Mark on this date of service. This time includes time spent by me in the following activities: preparing for the visit, obtaining and/or reviewing a separately obtained history, performing a medically  appropriate examination and/or evaluation, counseling and educating the patient/family/caregiver, ordering medications, tests, or procedures, referring and communicating with other health care professionals, documenting information in the medical record, and care coordination.     Rodolfo Ferrara, APRN   06/16/23

## 2023-06-16 NOTE — PROGRESS NOTES
[facility] Hematology/Oncology Treatment Plan Summary    Name: Mark Ken  [facility]# [id number]  MD: Dr. Ludwig    Diagnosis:     ICD-10-CM ICD-9-CM   1. Gastric adenocarcinoma  C16.9 151.9       Goal of treatment: disease control    Treatment Medication(s):   Oxaliplatin  Leucovorin  Fluorouracil (5-FU)    Frequency: Treatment every 2 weeks    Number of cycles: 12 cycles    Starting on: 6/27/2023    Items for home use: Senokot-S (for constipation), Imodium AD (for diarrhea), and Thermometer    Rx written for: [x] Nausea    [] Pre-Treatment   ondansetron 8 mg by mouth every 8 hours as needed for nausea    Notes: You will return on day 3 of each cycle for 5-FU ball disconnect.    Completing Provider: BHARAT Orr           Date/time: 06/16/2023      Please note: You will be seen by a provider frequently with your treatment plan. This plan may change depending on many factors, if so, this will be discussed with you by your physician.  Last update 03/2022.

## 2023-06-21 ENCOUNTER — ON CAMPUS - OUTPATIENT (OUTPATIENT)
Dept: URBAN - METROPOLITAN AREA HOSPITAL 85 | Facility: HOSPITAL | Age: 73
End: 2023-06-21

## 2023-06-21 DIAGNOSIS — R93.89 ABNORMAL FINDINGS ON DIAGNOSTIC IMAGING OF OTHER SPECIFIED B: ICD-10-CM

## 2023-06-21 DIAGNOSIS — C16.9 MALIGNANT NEOPLASM OF STOMACH, UNSPECIFIED: ICD-10-CM

## 2023-06-21 DIAGNOSIS — R19.09 OTHER INTRA-ABDOMINAL AND PELVIC SWELLING, MASS AND LUMP: ICD-10-CM

## 2023-06-21 DIAGNOSIS — K25.9 GASTRIC ULCER, UNSPECIFIED AS ACUTE OR CHRONIC, WITHOUT HEMO: ICD-10-CM

## 2023-06-21 DIAGNOSIS — D36.0 BENIGN NEOPLASM OF LYMPH NODES: ICD-10-CM

## 2023-06-21 PROCEDURE — 43242 EGD US FINE NEEDLE BX/ASPIR: CPT | Performed by: INTERNAL MEDICINE

## 2023-06-26 PROBLEM — Z45.2 FITTING AND ADJUSTMENT OF VASCULAR CATHETER: Status: ACTIVE | Noted: 2023-06-26

## 2023-07-25 ENCOUNTER — OFFICE VISIT (OUTPATIENT)
Dept: ONCOLOGY | Facility: CLINIC | Age: 73
End: 2023-07-25
Payer: MEDICARE

## 2023-07-25 ENCOUNTER — INFUSION (OUTPATIENT)
Dept: ONCOLOGY | Facility: HOSPITAL | Age: 73
End: 2023-07-25
Payer: MEDICARE

## 2023-07-25 VITALS
HEIGHT: 68 IN | DIASTOLIC BLOOD PRESSURE: 92 MMHG | HEART RATE: 72 BPM | RESPIRATION RATE: 16 BRPM | OXYGEN SATURATION: 98 % | SYSTOLIC BLOOD PRESSURE: 169 MMHG | BODY MASS INDEX: 30.58 KG/M2 | WEIGHT: 201.8 LBS | TEMPERATURE: 97.3 F

## 2023-07-25 DIAGNOSIS — C16.9 GASTRIC ADENOCARCINOMA: Primary | ICD-10-CM

## 2023-07-25 DIAGNOSIS — C16.9 GASTRIC ADENOCARCINOMA: ICD-10-CM

## 2023-07-25 LAB
ALBUMIN SERPL-MCNC: 3.3 G/DL (ref 3.5–5.2)
ALBUMIN/GLOB SERPL: 1.4 G/DL
ALP SERPL-CCNC: 72 U/L (ref 39–117)
ALT SERPL W P-5'-P-CCNC: 20 U/L (ref 1–41)
ANION GAP SERPL CALCULATED.3IONS-SCNC: 9.1 MMOL/L (ref 5–15)
AST SERPL-CCNC: 15 U/L (ref 1–40)
BASOPHILS # BLD AUTO: 0.02 10*3/MM3 (ref 0–0.2)
BASOPHILS NFR BLD AUTO: 0.3 % (ref 0–1.5)
BILIRUB SERPL-MCNC: 0.2 MG/DL (ref 0–1.2)
BUN SERPL-MCNC: 15 MG/DL (ref 8–23)
BUN/CREAT SERPL: 16 (ref 7–25)
CALCIUM SPEC-SCNC: 8.7 MG/DL (ref 8.6–10.5)
CHLORIDE SERPL-SCNC: 108 MMOL/L (ref 98–107)
CO2 SERPL-SCNC: 25.9 MMOL/L (ref 22–29)
CREAT SERPL-MCNC: 0.94 MG/DL (ref 0.76–1.27)
DEPRECATED RDW RBC AUTO: 52.9 FL (ref 37–54)
EGFRCR SERPLBLD CKD-EPI 2021: 86.7 ML/MIN/1.73
EOSINOPHIL # BLD AUTO: 0.18 10*3/MM3 (ref 0–0.4)
EOSINOPHIL NFR BLD AUTO: 3.1 % (ref 0.3–6.2)
ERYTHROCYTE [DISTWIDTH] IN BLOOD BY AUTOMATED COUNT: 17.8 % (ref 12.3–15.4)
GLOBULIN UR ELPH-MCNC: 2.3 GM/DL
GLUCOSE SERPL-MCNC: 176 MG/DL (ref 65–99)
HCT VFR BLD AUTO: 35 % (ref 37.5–51)
HGB BLD-MCNC: 11.5 G/DL (ref 13–17.7)
IMM GRANULOCYTES # BLD AUTO: 0.01 10*3/MM3 (ref 0–0.05)
IMM GRANULOCYTES NFR BLD AUTO: 0.2 % (ref 0–0.5)
LYMPHOCYTES # BLD AUTO: 1.78 10*3/MM3 (ref 0.7–3.1)
LYMPHOCYTES NFR BLD AUTO: 30.7 % (ref 19.6–45.3)
MCH RBC QN AUTO: 27.8 PG (ref 26.6–33)
MCHC RBC AUTO-ENTMCNC: 32.9 G/DL (ref 31.5–35.7)
MCV RBC AUTO: 84.7 FL (ref 79–97)
MONOCYTES # BLD AUTO: 0.67 10*3/MM3 (ref 0.1–0.9)
MONOCYTES NFR BLD AUTO: 11.6 % (ref 5–12)
NEUTROPHILS NFR BLD AUTO: 3.13 10*3/MM3 (ref 1.7–7)
NEUTROPHILS NFR BLD AUTO: 54.1 % (ref 42.7–76)
NRBC BLD AUTO-RTO: 0 /100 WBC (ref 0–0.2)
PLATELET # BLD AUTO: 154 10*3/MM3 (ref 140–450)
PMV BLD AUTO: 9.7 FL (ref 6–12)
POTASSIUM SERPL-SCNC: 3.3 MMOL/L (ref 3.5–5.2)
PROT SERPL-MCNC: 5.6 G/DL (ref 6–8.5)
RBC # BLD AUTO: 4.13 10*6/MM3 (ref 4.14–5.8)
SODIUM SERPL-SCNC: 143 MMOL/L (ref 136–145)
WBC NRBC COR # BLD: 5.79 10*3/MM3 (ref 3.4–10.8)

## 2023-07-25 PROCEDURE — 25010000002 FLUOROURACIL PER 500 MG: Performed by: NURSE PRACTITIONER

## 2023-07-25 PROCEDURE — 25010000002 DEXAMETHASONE SODIUM PHOSPHATE 100 MG/10ML SOLUTION: Performed by: NURSE PRACTITIONER

## 2023-07-25 PROCEDURE — 25010000002 FOSAPREPITANT PER 1 MG: Performed by: NURSE PRACTITIONER

## 2023-07-25 PROCEDURE — 96375 TX/PRO/DX INJ NEW DRUG ADDON: CPT

## 2023-07-25 PROCEDURE — 96367 TX/PROPH/DG ADDL SEQ IV INF: CPT

## 2023-07-25 PROCEDURE — G0498 CHEMO EXTEND IV INFUS W/PUMP: HCPCS

## 2023-07-25 PROCEDURE — 96416 CHEMO PROLONG INFUSE W/PUMP: CPT

## 2023-07-25 PROCEDURE — 25010000002 LEUCOVORIN CALCIUM PER 50 MG: Performed by: NURSE PRACTITIONER

## 2023-07-25 PROCEDURE — 96413 CHEMO IV INFUSION 1 HR: CPT

## 2023-07-25 PROCEDURE — 96368 THER/DIAG CONCURRENT INF: CPT

## 2023-07-25 PROCEDURE — 80053 COMPREHEN METABOLIC PANEL: CPT | Performed by: INTERNAL MEDICINE

## 2023-07-25 PROCEDURE — 85025 COMPLETE CBC W/AUTO DIFF WBC: CPT | Performed by: INTERNAL MEDICINE

## 2023-07-25 PROCEDURE — 96415 CHEMO IV INFUSION ADDL HR: CPT

## 2023-07-25 PROCEDURE — 25010000002 OXALIPLATIN PER 0.5 MG: Performed by: NURSE PRACTITIONER

## 2023-07-25 PROCEDURE — 96411 CHEMO IV PUSH ADDL DRUG: CPT

## 2023-07-25 PROCEDURE — 25010000002 PALONOSETRON PER 25 MCG: Performed by: NURSE PRACTITIONER

## 2023-07-25 RX ORDER — FLUOROURACIL 50 MG/ML
400 INJECTION, SOLUTION INTRAVENOUS ONCE
Status: COMPLETED | OUTPATIENT
Start: 2023-07-25 | End: 2023-07-25

## 2023-07-25 RX ORDER — PALONOSETRON 0.05 MG/ML
0.25 INJECTION, SOLUTION INTRAVENOUS ONCE
Status: CANCELLED | OUTPATIENT
Start: 2023-07-25

## 2023-07-25 RX ORDER — DEXTROSE MONOHYDRATE 50 MG/ML
250 INJECTION, SOLUTION INTRAVENOUS ONCE
Status: COMPLETED | OUTPATIENT
Start: 2023-07-25 | End: 2023-07-25

## 2023-07-25 RX ORDER — FLUOROURACIL 50 MG/ML
400 INJECTION, SOLUTION INTRAVENOUS ONCE
Status: CANCELLED | OUTPATIENT
Start: 2023-07-25

## 2023-07-25 RX ORDER — PALONOSETRON 0.05 MG/ML
0.25 INJECTION, SOLUTION INTRAVENOUS ONCE
Status: COMPLETED | OUTPATIENT
Start: 2023-07-25 | End: 2023-07-25

## 2023-07-25 RX ORDER — FAMOTIDINE 10 MG/ML
20 INJECTION, SOLUTION INTRAVENOUS AS NEEDED
Status: CANCELLED | OUTPATIENT
Start: 2023-07-25

## 2023-07-25 RX ORDER — DIPHENHYDRAMINE HYDROCHLORIDE 50 MG/ML
50 INJECTION INTRAMUSCULAR; INTRAVENOUS AS NEEDED
Status: CANCELLED | OUTPATIENT
Start: 2023-07-25

## 2023-07-25 RX ORDER — DEXTROSE MONOHYDRATE 50 MG/ML
250 INJECTION, SOLUTION INTRAVENOUS ONCE
Status: CANCELLED | OUTPATIENT
Start: 2023-07-25

## 2023-07-25 RX ADMIN — OXALIPLATIN 175 MG: 5 INJECTION, SOLUTION INTRAVENOUS at 10:10

## 2023-07-25 RX ADMIN — FOSAPREPITANT 100 ML: 150 INJECTION, POWDER, LYOPHILIZED, FOR SOLUTION INTRAVENOUS at 09:21

## 2023-07-25 RX ADMIN — FLUOROURACIL 830 MG: 50 INJECTION, SOLUTION INTRAVENOUS at 12:17

## 2023-07-25 RX ADMIN — DEXTROSE MONOHYDRATE 250 ML: 50 INJECTION, SOLUTION INTRAVENOUS at 08:54

## 2023-07-25 RX ADMIN — DEXAMETHASONE SODIUM PHOSPHATE 12 MG: 10 INJECTION, SOLUTION INTRAMUSCULAR; INTRAVENOUS at 09:03

## 2023-07-25 RX ADMIN — PALONOSETRON 0.25 MG: 0.05 INJECTION, SOLUTION INTRAVENOUS at 09:00

## 2023-07-25 RX ADMIN — FLUOROURACIL 4970 MG: 50 INJECTION, SOLUTION INTRAVENOUS at 12:23

## 2023-07-25 RX ADMIN — LEUCOVORIN CALCIUM 830 MG: 350 INJECTION, POWDER, LYOPHILIZED, FOR SOLUTION INTRAMUSCULAR; INTRAVENOUS at 10:10

## 2023-07-25 NOTE — NURSING NOTE
Informed BHARAT Douglas of pt's K 3.3 today and that pt states he's only been taking it once a day instead of BID as prescribed.  Reinforced importance of taking K supplement twice a day as prescribed. Pt verbalized understanding. No additional orders received from BHARAT Douglas.
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
## 2023-07-27 ENCOUNTER — INFUSION (OUTPATIENT)
Dept: ONCOLOGY | Facility: HOSPITAL | Age: 73
End: 2023-07-27
Payer: MEDICARE

## 2023-07-27 DIAGNOSIS — C16.9 GASTRIC ADENOCARCINOMA: Primary | ICD-10-CM

## 2023-07-27 DIAGNOSIS — Z45.2 FITTING AND ADJUSTMENT OF VASCULAR CATHETER: ICD-10-CM

## 2023-07-27 PROCEDURE — 25010000002 HEPARIN LOCK FLUSH PER 10 UNITS: Performed by: INTERNAL MEDICINE

## 2023-07-27 RX ORDER — SODIUM CHLORIDE 0.9 % (FLUSH) 0.9 %
10 SYRINGE (ML) INJECTION AS NEEDED
OUTPATIENT
Start: 2023-07-27

## 2023-07-27 RX ORDER — SODIUM CHLORIDE 0.9 % (FLUSH) 0.9 %
10 SYRINGE (ML) INJECTION AS NEEDED
Status: DISCONTINUED | OUTPATIENT
Start: 2023-07-27 | End: 2023-07-27 | Stop reason: HOSPADM

## 2023-07-27 RX ORDER — HEPARIN SODIUM (PORCINE) LOCK FLUSH IV SOLN 100 UNIT/ML 100 UNIT/ML
500 SOLUTION INTRAVENOUS AS NEEDED
OUTPATIENT
Start: 2023-07-27

## 2023-07-27 RX ORDER — HEPARIN SODIUM (PORCINE) LOCK FLUSH IV SOLN 100 UNIT/ML 100 UNIT/ML
500 SOLUTION INTRAVENOUS AS NEEDED
Status: DISCONTINUED | OUTPATIENT
Start: 2023-07-27 | End: 2023-07-27 | Stop reason: HOSPADM

## 2023-07-27 RX ADMIN — Medication 10 ML: at 09:59

## 2023-07-27 RX ADMIN — Medication 500 UNITS: at 10:00

## 2023-08-02 NOTE — PROGRESS NOTES
THORACIC SURGERY CLINIC CONSULT    REASON FOR CONSULT: Siewert type III gastroesophageal adenocarcinoma    REFERRING PROVIDER: Tito Mckenzie MD    Subjective   HISTORY OF PRESENTING ILLNESS:   Mark Ken is a 73 y.o. male has significant medical problems as mentioned below.  He reports he initially thought he had food poisoning and was evaluated by physician.  He had a colonoscopy and upper endoscopy. EGD was done on 4/27/2023 and found mucosal changes characterized by ulceration along the lesser curvature.  Biopsy was taken and confirmed moderately to poorly differentiated adenocarcinoma arising in the background of intestinal metaplasia with high-grade dysplasia. PET/CT on 5/23/2023 reported low activity in a short segment along the lesser curvature of the stomach likely presenting site of gastric malignancy.  There was no evidence of distant or regional metastasis. He was referred to Dr. Mckenzie for surgical management of gastric cancer.  He underwent repeat EGD and mucosal abnormality consistent with prior gastric carcinoma diagnosis was noted 4 cm distal to the gastroesophageal junction along the lesser curvature.  Diagnostic laparoscopy did not reveal peritoneal disease and peritoneal lavage was also negative for malignant cells. Due to its close proximity to the gastroesophageal junction, he was referred to thoracic surgery for further evaluation.    At the time of initial presentation, he did not have any symptoms and denied losing weight. He has not had any abdominal surgery prior to his surgery with Dr. Mckenzie. He denied problems with his chest or lung. He never smoked and did not drink alcohol regularly. He had been taking omeprazole for acid reflux. He never had a heart attack. He drives, walks, and is generally active. Before he retired he was in the [employer] business.    ECOG 0    After the initial consultation, EGD and EUS was performed by Dr. Hodge on 6/21/2023 and reported gastric mass  with invasion into the muscularis propria.  There were 3 small lymph nodes at the celiac axis which were visualized.  The largest measured 6 mm in diameter and fine-needle biopsy was performed which was negative for malignancy.  I discussed this case with Dr. Ludwig and we planned neoadjuvant chemotherapy followed by restaging PET/CT. He tolerated the chemotherapy very well.  He received total 4 cycles of FOLFOX and the last chemotherapy cycle was on 8/9/2023.      Past Medical History:   Diagnosis Date    BPH (benign prostatic hyperplasia)     Depression     Diabetes mellitus     GERD (gastroesophageal reflux disease)     Gout     Hyperlipidemia     Hypertension     Neuropathy     bilat feet    Pulmonary arterial hypertension     Restless legs     Stomach cancer        Past Surgical History:   Procedure Laterality Date    CARPAL TUNNEL RELEASE Left 01/19/2023    Procedure: LEFT CARPAL TUNNEL RELEASE AND SYNOVIAL BIOPSY;  Surgeon: Mikel Dupont MD;  Location: [facility] MAIN OR;  Service: Hand;  Laterality: Left;    CARPAL TUNNEL RELEASE Right 03/02/2023    Procedure: RIGHT CARPAL TUNNEL RELEASE;  Surgeon: Mikel Dupont MD;  Location: [facility] MAIN OR;  Service: Hand;  Laterality: Right;    COLONOSCOPY N/A 03/09/2023    DIAGNOSTIC LAPAROSCOPY N/A 5/30/2023    Procedure: DIAGNOSTIC LAPAROSCOPY with peritoneal washing;  Surgeon: Tito Mckenzie MD;  Location: [facility] OR;  Service: General;  Laterality: N/A;    ENDOSCOPY N/A 04/27/2023    Dr. Reilly    ENDOSCOPY N/A 5/30/2023    Procedure: ESOPHAGOGASTRODUODENOSCOPY;  Surgeon: Tito Mckenzie MD;  Location: [facility] OR;  Service: General;  Laterality: N/A;    HAND SURGERY Left     KNEE ARTHROSCOPY Bilateral     TOTAL KNEE ARTHROPLASTY Right 02/21/2018    UPPER ENDOSCOPIC ULTRASOUND W/ FNA N/A 6/21/2023    Procedure: ENDOSCOPIC ULTRASOUND WITH STAGING AND FINE NEEDLE ASPIRATION;  Surgeon: Kavon Hodge MD;  Location: [facility] ENDOSCOPY;   Service: Gastroenterology;  Laterality: N/A;  Post:    VENOUS ACCESS DEVICE (PORT) INSERTION N/A 6/23/2023    Procedure: INSERTION VENOUS ACCESS DEVICE;  Surgeon: Berry Varela MD;  Location: [facility] MAIN OR;  Service: Thoracic;  Laterality: N/A;       Family History   Problem Relation Age of Onset    Malig Hyperthermia Neg Hx        Social History     Socioeconomic History    Marital status:    Tobacco Use    Smoking status: Never     Passive exposure: Never    Smokeless tobacco: Never   Vaping Use    Vaping Use: Never used   Substance and Sexual Activity    Alcohol use: Yes     Comment: once a month  one  Esha/with Mexican Dinner    Drug use: Yes     Types: Marijuana     Comment: thc gummies for sleep, 2 months ago    Sexual activity: Not Currently     Partners: Female     Birth control/protection: Vasectomy         Current Outpatient Medications:     allopurinol (ZYLOPRIM) 300 MG tablet, Take 1 tablet by mouth Daily., Disp: , Rfl:     amLODIPine (NORVASC) 5 MG tablet, Take 1 tablet by mouth Daily., Disp: 90 tablet, Rfl: 3    DULoxetine (CYMBALTA) 60 MG capsule, Take 1 capsule by mouth Daily., Disp: , Rfl:     lisinopril (PRINIVIL,ZESTRIL) 40 MG tablet, Take 1 tablet by mouth Daily., Disp: , Rfl:     metFORMIN (GLUCOPHAGE) 1000 MG tablet, Take 1 tablet by mouth 2 (Two) Times a Day With Meals. Hold dos, Disp: , Rfl:     metoprolol succinate XL (TOPROL-XL) 50 MG 24 hr tablet, Take 1 tablet by mouth Daily., Disp: 30 tablet, Rfl: 3    omeprazole (priLOSEC) 20 MG capsule, Take 1 capsule by mouth Daily., Disp: 30 capsule, Rfl: 3    ondansetron (Zofran) 4 MG tablet, Take 1 tablet by mouth Every 6 (Six) Hours As Needed for Nausea or Vomiting for up to 10 doses., Disp: 10 tablet, Rfl: 0    potassium chloride (K-DUR,KLOR-CON) 20 MEQ CR tablet, Take 1 tablet by mouth 2 (Two) Times a Day., Disp: 60 tablet, Rfl: 5    predniSONE (DELTASONE) 1 MG tablet, Take 3 tablets by mouth Daily., Disp: , Rfl:     predniSONE  "(DELTASONE) 5 MG tablet, Take 1 tablet by mouth Daily., Disp: , Rfl:     rOPINIRole (REQUIP) 2 MG tablet, Take 1 tablet by mouth Every 12 (Twelve) Hours. (Patient taking differently: Take 1 tablet by mouth Every Night.), Disp: 60 tablet, Rfl: 3    tamsulosin (FLOMAX) 0.4 MG capsule 24 hr capsule, Take 1 capsule by mouth Daily., Disp: , Rfl:     valsartan (DIOVAN) 160 MG tablet, Take 1 tablet by mouth Daily., Disp: 90 tablet, Rfl: 3    amLODIPine (NORVASC) 10 MG tablet, Take 1 tablet by mouth Daily., Disp: , Rfl:      No Known Allergies          Objective    OBJECTIVE:     VITAL SIGNS:  BP (!) 172/104 (BP Location: Left arm, Patient Position: Sitting, Cuff Size: Adult)   Pulse 91   Ht 172 cm (67.72\")   Wt 90.7 kg (200 lb)   SpO2 97%   BMI 30.66 kg/mý     PHYSICAL EXAM:  Constitutional:       Appearance: Normal appearance.   HENT:      Head: Normocephalic.      Right Ear: External ear normal.      Left Ear: External ear normal.      Nose: Nose normal.      Mouth/Throat: No obvious deformity     Pharynx: Oropharynx is clear.   Eyes:      Conjunctiva/sclera: Conjunctivae normal.   Cardiovascular:      Rate and Rhythm: Normal rate.      Pulses: Normal pulses.   Pulmonary:      Effort: Pulmonary effort is normal.   Abdominal:      Palpations: Abdomen is soft.   Musculoskeletal:         General: Normal range of motion.      Cervical back: Normal range of motion.   Skin:     General: Skin is warm.   Neurological:      General: No focal deficit present.      Mental Status: He is alert and oriented to person, place, and time.     LAB RESULTS:  I have reviewed all the available laboratory results in the chart.    RESULTS REVIEW:  I have reviewed the patient's all relevant laboratory and imaging findings.     IMAGING RESULTS:    EGD 4/27/23:           CT chest, abdomen and pelvis 5/4/2023:  1. Masslike circumferential thickening of the lesser curvature of the stomach with a central area of ulceration. The patient has a " known diagnosis of gastric carcinoma.   2. Prominent gastric hepatic ligament lymph nodes concerning for metastatic disease.   3. No evidence of distant metastatic disease.     PET/ CT 5/23/2023:  1. Low level activity at a short segment of the lesser curvature of the stomach likely representing a site of gastric malignancy. The nodes within the abdomen have very low-level metabolic activity.  2. Shotty nodes at both axilla and both groin regions with very low-level activity are likely benign, but conservative surveillance with CT is recommended.    EGD 5/30/2023 by Dr. Tito Mckenzie:  Gastric carcinoma approximately 4 cm distal to the GE junction along the lesser curvature.    Diagnostic laparoscopy with peritoneal lavage 5/30/2023:  No evidence of peritoneal carcinomatosis, liver or omental implants.  Abdominal Fluid, Peritoneal Washing:   A. No definitive malignant cells identified.  B. Hypocellular degenerating specimen with mesothelial cells and  inflammatory cells.    MRI brain:  Not indicated    Cardiac testing:  Transthoracic echo 12/6/2022:    Left ventricular ejection fraction appears to be 56 - 60%.    Left ventricular wall thickness is consistent with mild concentric hypertrophy.    Left ventricular diastolic function is consistent with (grade Ia w/high LAP) impaired relaxation.    Normal right ventricular cavity size and systolic function noted.    The left atrial cavity is mildly dilated.    There is mild pulmonic valve regurgitation present.    There is no evidence of pericardial effusion.    Duplex renal arterial bilateral 1/31/2022:  Mild-moderate right renal artery stenosis.  Normal left renal artery.    Pulmonary Function Test:  Not done        ASSESSMENT & PLAN:  Mark Ken is a 73 y.o. male with significant medical conditions as mentioned above presented to my clinic.    Diagnosis: Siewert type III gastroesophageal adenocarcinoma  Staging: Stage IB ([id number])    Siewert type III  gastroesophageal cancer are typically treated similar as gastric cancer.  He received 3 cycles of FOLFOX and tolerated very well.  I recommended repeat PET/CT.  If there is no evidence of distant metastases or disease progression, we will proceed with surgical planning.    I will be available to assist Dr. Mckenzie for surgical resection which would likely require total gastrectomy, partial Jasiel Arnulfo esophagectomy and Steffany-en-Y reconstruction.    I discussed the patients findings and my recommendations with the patient. The patient was given adequate time to ask questions and all questions were answered to patient satisfaction.     Berry Varela MD  Thoracic Surgeon  [facility]        Dictated utilizing Dragon dictation    I spent 35 minutes caring for Mark on this date of service. This time includes time spent by me in the following activities:preparing for the visit, reviewing tests, obtaining and/or reviewing a separately obtained history, performing a medically appropriate examination and/or evaluation , counseling and educating the patient/family/caregiver, ordering medications, tests, or procedures, referring and communicating with other health care professionals , documenting information in the medical record, independently interpreting results and communicating that information with the patient/family/caregiver, and care coordination and more than half the time was spent in direct face to face evaluation and decision making.     Transcribed from ambient dictation for Berry Varela MD by Lisa Christie.  08/03/23   10:26 EDT    Patient or patient representative verbalized consent to the visit recording.  I have personally performed the services described in this document as transcribed by the above individual, and it is both accurate and complete.

## 2023-08-03 ENCOUNTER — OFFICE VISIT (OUTPATIENT)
Dept: SURGERY | Facility: CLINIC | Age: 73
End: 2023-08-03
Payer: MEDICARE

## 2023-08-03 VITALS
SYSTOLIC BLOOD PRESSURE: 172 MMHG | HEART RATE: 91 BPM | BODY MASS INDEX: 30.31 KG/M2 | HEIGHT: 68 IN | OXYGEN SATURATION: 97 % | WEIGHT: 200 LBS | DIASTOLIC BLOOD PRESSURE: 104 MMHG

## 2023-08-03 DIAGNOSIS — C16.5 MALIGNANT NEOPLASM OF LESSER CURVATURE OF STOMACH, UNSPECIFIED: ICD-10-CM

## 2023-08-03 DIAGNOSIS — C16.9 GASTRIC ADENOCARCINOMA: Primary | ICD-10-CM

## 2023-08-03 PROCEDURE — 99214 OFFICE O/P EST MOD 30 MIN: CPT | Performed by: SURGERY

## 2023-08-07 DIAGNOSIS — C16.9 GASTRIC ADENOCARCINOMA: Primary | ICD-10-CM
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
## 2023-08-08 ENCOUNTER — OFFICE VISIT (OUTPATIENT)
Dept: ONCOLOGY | Facility: CLINIC | Age: 73
End: 2023-08-08
Payer: MEDICARE

## 2023-08-08 ENCOUNTER — INFUSION (OUTPATIENT)
Dept: ONCOLOGY | Facility: HOSPITAL | Age: 73
End: 2023-08-08
Payer: MEDICARE

## 2023-08-08 VITALS
HEIGHT: 68 IN | RESPIRATION RATE: 16 BRPM | SYSTOLIC BLOOD PRESSURE: 146 MMHG | OXYGEN SATURATION: 97 % | HEART RATE: 87 BPM | WEIGHT: 201.6 LBS | BODY MASS INDEX: 30.55 KG/M2 | DIASTOLIC BLOOD PRESSURE: 89 MMHG | TEMPERATURE: 97.7 F

## 2023-08-08 DIAGNOSIS — C16.9 GASTRIC ADENOCARCINOMA: ICD-10-CM

## 2023-08-08 DIAGNOSIS — C16.9 GASTRIC ADENOCARCINOMA: Primary | ICD-10-CM

## 2023-08-08 DIAGNOSIS — G62.9 PERIPHERAL POLYNEUROPATHY: ICD-10-CM

## 2023-08-08 LAB
ALBUMIN SERPL-MCNC: 3.6 G/DL (ref 3.5–5.2)
ALBUMIN/GLOB SERPL: 1.3 G/DL
ALP SERPL-CCNC: 78 U/L (ref 39–117)
ALT SERPL W P-5'-P-CCNC: 26 U/L (ref 1–41)
ANION GAP SERPL CALCULATED.3IONS-SCNC: 12.6 MMOL/L (ref 5–15)
AST SERPL-CCNC: 18 U/L (ref 1–40)
BASOPHILS # BLD AUTO: 0.02 10*3/MM3 (ref 0–0.2)
BASOPHILS NFR BLD AUTO: 0.3 % (ref 0–1.5)
BILIRUB SERPL-MCNC: 0.5 MG/DL (ref 0–1.2)
BUN SERPL-MCNC: 15 MG/DL (ref 8–23)
BUN/CREAT SERPL: 17 (ref 7–25)
CALCIUM SPEC-SCNC: 9.6 MG/DL (ref 8.6–10.5)
CHLORIDE SERPL-SCNC: 104 MMOL/L (ref 98–107)
CO2 SERPL-SCNC: 24.4 MMOL/L (ref 22–29)
CREAT SERPL-MCNC: 0.88 MG/DL (ref 0.76–1.27)
DEPRECATED RDW RBC AUTO: 57.6 FL (ref 37–54)
EGFRCR SERPLBLD CKD-EPI 2021: 91.4 ML/MIN/1.73
EOSINOPHIL # BLD AUTO: 0.18 10*3/MM3 (ref 0–0.4)
EOSINOPHIL NFR BLD AUTO: 3.1 % (ref 0.3–6.2)
ERYTHROCYTE [DISTWIDTH] IN BLOOD BY AUTOMATED COUNT: 19 % (ref 12.3–15.4)
GLOBULIN UR ELPH-MCNC: 2.7 GM/DL
GLUCOSE SERPL-MCNC: 109 MG/DL (ref 65–99)
HCT VFR BLD AUTO: 38.1 % (ref 37.5–51)
HGB BLD-MCNC: 12.8 G/DL (ref 13–17.7)
IMM GRANULOCYTES # BLD AUTO: 0.07 10*3/MM3 (ref 0–0.05)
IMM GRANULOCYTES NFR BLD AUTO: 1.2 % (ref 0–0.5)
LYMPHOCYTES # BLD AUTO: 1.8 10*3/MM3 (ref 0.7–3.1)
LYMPHOCYTES NFR BLD AUTO: 30.8 % (ref 19.6–45.3)
MCH RBC QN AUTO: 28.6 PG (ref 26.6–33)
MCHC RBC AUTO-ENTMCNC: 33.6 G/DL (ref 31.5–35.7)
MCV RBC AUTO: 85.2 FL (ref 79–97)
MONOCYTES # BLD AUTO: 0.79 10*3/MM3 (ref 0.1–0.9)
MONOCYTES NFR BLD AUTO: 13.5 % (ref 5–12)
NEUTROPHILS NFR BLD AUTO: 2.98 10*3/MM3 (ref 1.7–7)
NEUTROPHILS NFR BLD AUTO: 51.1 % (ref 42.7–76)
NRBC BLD AUTO-RTO: 0 /100 WBC (ref 0–0.2)
PLAT MORPH BLD: NORMAL
PLATELET # BLD AUTO: 134 10*3/MM3 (ref 140–450)
PMV BLD AUTO: 9.7 FL (ref 6–12)
POTASSIUM SERPL-SCNC: 4.2 MMOL/L (ref 3.5–5.2)
PROT SERPL-MCNC: 6.3 G/DL (ref 6–8.5)
RBC # BLD AUTO: 4.47 10*6/MM3 (ref 4.14–5.8)
RBC MORPH BLD: NORMAL
SODIUM SERPL-SCNC: 141 MMOL/L (ref 136–145)
WBC MORPH BLD: NORMAL
WBC NRBC COR # BLD: 5.84 10*3/MM3 (ref 3.4–10.8)

## 2023-08-08 PROCEDURE — 25010000002 FLUOROURACIL PER 500 MG: Performed by: INTERNAL MEDICINE

## 2023-08-08 PROCEDURE — 96411 CHEMO IV PUSH ADDL DRUG: CPT

## 2023-08-08 PROCEDURE — 80053 COMPREHEN METABOLIC PANEL: CPT | Performed by: INTERNAL MEDICINE

## 2023-08-08 PROCEDURE — 96413 CHEMO IV INFUSION 1 HR: CPT

## 2023-08-08 PROCEDURE — G0498 CHEMO EXTEND IV INFUS W/PUMP: HCPCS

## 2023-08-08 PROCEDURE — 25010000002 DEXAMETHASONE SODIUM PHOSPHATE 100 MG/10ML SOLUTION: Performed by: INTERNAL MEDICINE

## 2023-08-08 PROCEDURE — 96367 TX/PROPH/DG ADDL SEQ IV INF: CPT

## 2023-08-08 PROCEDURE — 96415 CHEMO IV INFUSION ADDL HR: CPT

## 2023-08-08 PROCEDURE — 96416 CHEMO PROLONG INFUSE W/PUMP: CPT

## 2023-08-08 PROCEDURE — 96375 TX/PRO/DX INJ NEW DRUG ADDON: CPT

## 2023-08-08 PROCEDURE — 85025 COMPLETE CBC W/AUTO DIFF WBC: CPT | Performed by: INTERNAL MEDICINE

## 2023-08-08 PROCEDURE — 96368 THER/DIAG CONCURRENT INF: CPT

## 2023-08-08 PROCEDURE — 25010000002 OXALIPLATIN PER 0.5 MG: Performed by: INTERNAL MEDICINE

## 2023-08-08 PROCEDURE — 85007 BL SMEAR W/DIFF WBC COUNT: CPT | Performed by: INTERNAL MEDICINE

## 2023-08-08 PROCEDURE — 25010000002 LEUCOVORIN 200 MG RECONSTITUTED SOLUTION 200 MG VIAL: Performed by: INTERNAL MEDICINE

## 2023-08-08 PROCEDURE — 25010000002 PALONOSETRON PER 25 MCG: Performed by: INTERNAL MEDICINE

## 2023-08-08 PROCEDURE — 25010000002 FOSAPREPITANT PER 1 MG: Performed by: INTERNAL MEDICINE

## 2023-08-08 RX ORDER — DIPHENHYDRAMINE HYDROCHLORIDE 50 MG/ML
50 INJECTION INTRAMUSCULAR; INTRAVENOUS AS NEEDED
Status: CANCELLED | OUTPATIENT
Start: 2023-08-08

## 2023-08-08 RX ORDER — AMLODIPINE BESYLATE 10 MG/1
10 TABLET ORAL DAILY
COMMUNITY
Start: 2023-08-08

## 2023-08-08 RX ORDER — FLUOROURACIL 50 MG/ML
400 INJECTION, SOLUTION INTRAVENOUS ONCE
Status: CANCELLED | OUTPATIENT
Start: 2023-08-08

## 2023-08-08 RX ORDER — PALONOSETRON 0.05 MG/ML
0.25 INJECTION, SOLUTION INTRAVENOUS ONCE
Status: CANCELLED | OUTPATIENT
Start: 2023-08-08

## 2023-08-08 RX ORDER — DEXTROSE MONOHYDRATE 50 MG/ML
250 INJECTION, SOLUTION INTRAVENOUS ONCE
Status: COMPLETED | OUTPATIENT
Start: 2023-08-08 | End: 2023-08-08

## 2023-08-08 RX ORDER — PALONOSETRON 0.05 MG/ML
0.25 INJECTION, SOLUTION INTRAVENOUS ONCE
Status: COMPLETED | OUTPATIENT
Start: 2023-08-08 | End: 2023-08-08

## 2023-08-08 RX ORDER — FAMOTIDINE 10 MG/ML
20 INJECTION, SOLUTION INTRAVENOUS AS NEEDED
Status: CANCELLED | OUTPATIENT
Start: 2023-08-08

## 2023-08-08 RX ORDER — FLUOROURACIL 50 MG/ML
400 INJECTION, SOLUTION INTRAVENOUS ONCE
Status: COMPLETED | OUTPATIENT
Start: 2023-08-08 | End: 2023-08-08

## 2023-08-08 RX ORDER — DEXTROSE MONOHYDRATE 50 MG/ML
250 INJECTION, SOLUTION INTRAVENOUS ONCE
Status: CANCELLED | OUTPATIENT
Start: 2023-08-08

## 2023-08-08 RX ADMIN — DEXTROSE MONOHYDRATE 250 ML: 50 INJECTION, SOLUTION INTRAVENOUS at 09:18

## 2023-08-08 RX ADMIN — FLUOROURACIL 4970 MG: 50 INJECTION, SOLUTION INTRAVENOUS at 12:37

## 2023-08-08 RX ADMIN — FLUOROURACIL 830 MG: 50 INJECTION, SOLUTION INTRAVENOUS at 12:32

## 2023-08-08 RX ADMIN — FOSAPREPITANT 100 ML: 150 INJECTION, POWDER, LYOPHILIZED, FOR SOLUTION INTRAVENOUS at 09:39

## 2023-08-08 RX ADMIN — PALONOSETRON HYDROCHLORIDE 0.25 MG: 0.25 INJECTION INTRAVENOUS at 09:17

## 2023-08-08 RX ADMIN — DEXAMETHASONE SODIUM PHOSPHATE 12 MG: 10 INJECTION, SOLUTION INTRAMUSCULAR; INTRAVENOUS at 09:19

## 2023-08-08 RX ADMIN — OXALIPLATIN 175 MG: 5 INJECTION, SOLUTION INTRAVENOUS at 10:17

## 2023-08-08 RX ADMIN — LEUCOVORIN CALCIUM 830 MG: 200 INJECTION, POWDER, LYOPHILIZED, FOR SOLUTION INTRAMUSCULAR; INTRAVENOUS at 10:17

## 2023-08-10 ENCOUNTER — INFUSION (OUTPATIENT)
Dept: ONCOLOGY | Facility: HOSPITAL | Age: 73
End: 2023-08-10
Payer: MEDICARE

## 2023-08-10 DIAGNOSIS — Z45.2 FITTING AND ADJUSTMENT OF VASCULAR CATHETER: Primary | ICD-10-CM

## 2023-08-10 DIAGNOSIS — C16.9 GASTRIC ADENOCARCINOMA: ICD-10-CM

## 2023-08-10 PROCEDURE — 25010000002 HEPARIN LOCK FLUSH PER 10 UNITS: Performed by: INTERNAL MEDICINE

## 2023-08-10 RX ORDER — SODIUM CHLORIDE 0.9 % (FLUSH) 0.9 %
10 SYRINGE (ML) INJECTION AS NEEDED
OUTPATIENT
Start: 2023-08-10

## 2023-08-10 RX ORDER — HEPARIN SODIUM (PORCINE) LOCK FLUSH IV SOLN 100 UNIT/ML 100 UNIT/ML
500 SOLUTION INTRAVENOUS AS NEEDED
OUTPATIENT
Start: 2023-08-10

## 2023-08-10 RX ORDER — HEPARIN SODIUM (PORCINE) LOCK FLUSH IV SOLN 100 UNIT/ML 100 UNIT/ML
500 SOLUTION INTRAVENOUS AS NEEDED
Status: DISCONTINUED | OUTPATIENT
Start: 2023-08-10 | End: 2023-08-10 | Stop reason: HOSPADM

## 2023-08-10 RX ORDER — SODIUM CHLORIDE 0.9 % (FLUSH) 0.9 %
10 SYRINGE (ML) INJECTION AS NEEDED
Status: DISCONTINUED | OUTPATIENT
Start: 2023-08-10 | End: 2023-08-10 | Stop reason: HOSPADM

## 2023-08-10 RX ADMIN — Medication 10 ML: at 11:41

## 2023-08-10 RX ADMIN — Medication 500 UNITS: at 11:41

## 2023-08-14 ENCOUNTER — HOSPITAL ENCOUNTER (OUTPATIENT)
Dept: PET IMAGING | Facility: HOSPITAL | Age: 73
Discharge: HOME | End: 2023-08-14
Payer: MEDICARE

## 2023-08-14 DIAGNOSIS — C16.9 GASTRIC ADENOCARCINOMA: ICD-10-CM

## 2023-08-14 DIAGNOSIS — C16.5 MALIGNANT NEOPLASM OF LESSER CURVATURE OF STOMACH, UNSPECIFIED: ICD-10-CM

## 2023-08-14 LAB — GLUCOSE BLDC GLUCOMTR-MCNC: 118 MG/DL (ref 70–130)

## 2023-08-14 PROCEDURE — 82948 REAGENT STRIP/BLOOD GLUCOSE: CPT

## 2023-08-14 PROCEDURE — A9552 F18 FDG: HCPCS | Performed by: SURGERY

## 2023-08-14 PROCEDURE — 78815 PET IMAGE W/CT SKULL-THIGH: CPT

## 2023-08-14 PROCEDURE — 0 FLUDEOXYGLUCOSE F18 SOLUTION: Performed by: SURGERY

## 2023-08-14 RX ADMIN — FLUDEOXYGLUCOSE F18 1 DOSE: 300 INJECTION INTRAVENOUS at 08:08

## 2023-08-17 ENCOUNTER — OFFICE VISIT (OUTPATIENT)
Dept: SURGERY | Facility: CLINIC | Age: 73
End: 2023-08-17
Payer: MEDICARE

## 2023-08-17 VITALS
WEIGHT: 195 LBS | HEIGHT: 68 IN | SYSTOLIC BLOOD PRESSURE: 168 MMHG | OXYGEN SATURATION: 97 % | HEART RATE: 80 BPM | BODY MASS INDEX: 29.55 KG/M2 | DIASTOLIC BLOOD PRESSURE: 102 MMHG

## 2023-08-17 DIAGNOSIS — C16.9 GASTRIC ADENOCARCINOMA: Primary | ICD-10-CM

## 2023-08-17 RX ORDER — SPIRONOLACTONE 25 MG/1
1 TABLET ORAL DAILY
COMMUNITY
Start: 2023-08-10

## 2023-08-17 NOTE — H&P (VIEW-ONLY)
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 male has significant medical problems as mentioned below.  He reported that he initially thought he had food poisoning and was evaluated by a physician and was recommended colonoscopy and upper endoscopy. EGD was done on 4/27/2023 and found mucosal changes characterized by ulceration along the lesser curvature.  Biopsy was taken and confirmed moderately to poorly differentiated adenocarcinoma arising in the background of intestinal metaplasia with high-grade dysplasia. PET/CT on 5/23/2023 reported low activity in a short segment along the lesser curvature of the stomach likely presenting site of gastric malignancy.  There was no evidence of distant or regional metastasis. He was referred to Dr. Mckenzie for surgical management of gastric cancer.  He underwent repeat EGD and mucosal abnormality consistent with prior gastric carcinoma diagnosis was noted 4 cm distal to the gastroesophageal junction along the lesser curvature.  Diagnostic laparoscopy did not reveal peritoneal disease and peritoneal lavage was also negative for malignant cells. Due to its close proximity to the gastroesophageal junction, he was referred to thoracic surgery for further evaluation.    At the time of initial presentation, he did not have any symptoms and denied losing weight. He has not had any abdominal surgery prior to his surgery with Dr. Mckenzie. He denied problems with his chest or lung. He never smoked and did not drink alcohol regularly. He had been taking omeprazole for acid reflux. He never had a heart attack. He drives, walks, and is generally active. Before he retired he was in the [employer] business.     ECOG 0    After the initial consultation, EGD and EUS was performed by   Ayesha on 6/21/2023 and reported gastric mass with invasion into the muscularis propria.  There were 3 small lymph nodes at the celiac axis which were visualized.  The largest measured 6 mm in diameter and fine-needle biopsy was performed which was negative for malignancy.  I discussed this case with Dr. Ludwig and we planned neoadjuvant chemotherapy followed by restaging PET/CT. He received 3 cycles of FOLFOX and repeat PET/CT was performed on 8/14/2023 which reported resolution of prior hypermetabolism along the gastric lesser curvature.  There is subcentimeter upper abdominal and retroperitoneal lymph nodes which are too small to be characterized by PET/CT.  He tolerated the chemotherapy very well.  He received total 4 cycles of FOLFOX and the last chemotherapy cycle was on 8/9/2023.  Restaging PET/CT on 8/14/2023 reported resolution of prior focal mild hypermetabolism along the gastric lesser curvature.  There is subcentimeter upper abdominal and retroperitoneal lymph nodes that were too small to be accurately characterized by PET CT scan.  There was no evidence of distant metastases.    Past Medical History:   Diagnosis Date    BPH (benign prostatic hyperplasia)     Depression     Diabetes mellitus     GERD (gastroesophageal reflux disease)     Gout     Hyperlipidemia     Hypertension     Neuropathy     bilat feet    Pulmonary arterial hypertension     Restless legs     Stomach cancer        Past Surgical History:   Procedure Laterality Date    CARPAL TUNNEL RELEASE Left 01/19/2023    Procedure: LEFT CARPAL TUNNEL RELEASE AND SYNOVIAL BIOPSY;  Surgeon: Mikel Dupont MD;  [place] [facility] [place]  Service: Hand;  Laterality: Left;    CARPAL TUNNEL RELEASE Right 03/02/2023    Procedure: RIGHT CARPAL TUNNEL RELEASE;  Surgeon: Mikel Dupont MD;  [place] [facility] [place] [facility];  Service: Hand;  Laterality: Right;    COLONOSCOPY N/A 03/09/2023    DIAGNOSTIC LAPAROSCOPY N/A 5/30/2023    Procedure: DIAGNOSTIC  LAPAROSCOPY with peritoneal washing;  Surgeon: Tito Mckenzie MD;  Location: [facility] MAIN OR;  Service: General;  Laterality: N/A;    ENDOSCOPY N/A 04/27/2023    Dr. Reilly    ENDOSCOPY N/A 5/30/2023    Procedure: ESOPHAGOGASTRODUODENOSCOPY;  Surgeon: Tito Mckenzie MD;  Location: [facility] MAIN OR;  Service: General;  Laterality: N/A;    HAND SURGERY Left     KNEE ARTHROSCOPY Bilateral     TOTAL KNEE ARTHROPLASTY Right 02/21/2018    UPPER ENDOSCOPIC ULTRASOUND W/ FNA N/A 6/21/2023    Procedure: ENDOSCOPIC ULTRASOUND WITH STAGING AND FINE NEEDLE ASPIRATION;  Surgeon: Kavon Hodge MD;  Location: [facility] ENDOSCOPY;  Service: Gastroenterology;  Laterality: N/A;  Post:    VENOUS ACCESS DEVICE (PORT) INSERTION N/A 6/23/2023    Procedure: INSERTION VENOUS ACCESS DEVICE;  Surgeon: Berry Varela MD;  Location: [facility] MAIN OR;  Service: Thoracic;  Laterality: N/A;       Family History   Problem Relation Age of Onset    Malig Hyperthermia Neg Hx        Social History     Socioeconomic History    Marital status:    Tobacco Use    Smoking status: Never     Passive exposure: Never    Smokeless tobacco: Never   Vaping Use    Vaping Use: Never used   Substance and Sexual Activity    Alcohol use: Yes     Comment: once a month  one  Esha/with Mexican Dinner    Drug use: Yes     Types: Marijuana     Comment: thc gummies for sleep, 2 months ago    Sexual activity: Not Currently     Partners: Female     Birth control/protection: Vasectomy         Current Outpatient Medications:     allopurinol (ZYLOPRIM) 300 MG tablet, Take 1 tablet by mouth Daily., Disp: , Rfl:     amLODIPine (NORVASC) 10 MG tablet, Take 1 tablet by mouth Daily., Disp: , Rfl:     amLODIPine (NORVASC) 5 MG tablet, Take 1 tablet by mouth Daily., Disp: 90 tablet, Rfl: 3    DULoxetine (CYMBALTA) 60 MG capsule, Take 1 capsule by mouth Daily., Disp: , Rfl:     lisinopril (PRINIVIL,ZESTRIL) 40 MG tablet, Take 1 tablet by mouth Daily., Disp: , Rfl:      "metFORMIN (GLUCOPHAGE) 1000 MG tablet, Take 1 tablet by mouth 2 (Two) Times a Day With Meals. Hold dos, Disp: , Rfl:     metoprolol succinate XL (TOPROL-XL) 50 MG 24 hr tablet, Take 1 tablet by mouth Daily., Disp: 30 tablet, Rfl: 3    omeprazole (priLOSEC) 20 MG capsule, Take 1 capsule by mouth Daily., Disp: 30 capsule, Rfl: 3    potassium chloride (K-DUR,KLOR-CON) 20 MEQ CR tablet, Take 1 tablet by mouth 2 (Two) Times a Day., Disp: 60 tablet, Rfl: 5    predniSONE (DELTASONE) 1 MG tablet, Take 3 tablets by mouth Daily., Disp: , Rfl:     predniSONE (DELTASONE) 5 MG tablet, Take 1 tablet by mouth Daily., Disp: , Rfl:     rOPINIRole (REQUIP) 2 MG tablet, Take 1 tablet by mouth Every 12 (Twelve) Hours. (Patient taking differently: Take 1 tablet by mouth Every Night.), Disp: 60 tablet, Rfl: 3    spironolactone (ALDACTONE) 25 MG tablet, Take 1 tablet by mouth Daily., Disp: , Rfl:     tamsulosin (FLOMAX) 0.4 MG capsule 24 hr capsule, Take 1 capsule by mouth Daily., Disp: , Rfl:     valsartan (DIOVAN) 160 MG tablet, Take 1 tablet by mouth Daily., Disp: 90 tablet, Rfl: 3     No Known Allergies          Objective    OBJECTIVE:     VITAL SIGNS:  BP (!) 168/102 (BP Location: Left arm, Patient Position: Sitting, Cuff Size: Adult)   Pulse 80   Ht 172 cm (67.72\")   Wt 88.5 kg (195 lb)   SpO2 97%   BMI 29.90 kg/m²     PHYSICAL EXAM:  Constitutional:       Appearance: Normal appearance.   HENT:      Head: Normocephalic.      Right Ear: External ear normal.      Left Ear: External ear normal.      Nose: Nose normal.      Mouth/Throat: No obvious deformity     Pharynx: Oropharynx is clear.   Eyes:      Conjunctiva/sclera: Conjunctivae normal.   Cardiovascular:      Rate and Rhythm: Normal rate.      Pulses: Normal pulses.   Pulmonary:      Effort: Pulmonary effort is normal.   Abdominal:      Palpations: Abdomen is soft.   Musculoskeletal:         General: Normal range of motion.      Cervical back: Normal range of motion. "   Skin:     General: Skin is warm.   Neurological:      General: No focal deficit present.      Mental Status: He is alert and oriented to person, place, and time.     LAB RESULTS:  I have reviewed all the available laboratory results in the chart.    RESULTS REVIEW:  I have reviewed the patient's all relevant laboratory and imaging findings.     IMAGING RESULTS:    EGD 4/27/23:           CT chest, abdomen and pelvis 5/4/2023:  1. Masslike circumferential thickening of the lesser curvature of the stomach with a central area of ulceration. The patient has a known diagnosis of gastric carcinoma.   2. Prominent gastric hepatic ligament lymph nodes concerning for metastatic disease.   3. No evidence of distant metastatic disease.     PET/ CT 5/23/2023:  1. Low level activity at a short segment of the lesser curvature of the stomach likely representing a site of gastric malignancy. The nodes within the abdomen have very low-level metabolic activity.  2. Shotty nodes at both axilla and both groin regions with very low-level activity are likely benign, but conservative surveillance with CT is recommended.    EGD 5/30/2023 by Dr. Tito Mckenzie:  Gastric carcinoma approximately 4 cm distal to the GE junction along the lesser curvature.    Diagnostic laparoscopy with peritoneal lavage 5/30/2023:  No evidence of peritoneal carcinomatosis, liver or omental implants.  Abdominal Fluid, Peritoneal Washing:   A. No definitive malignant cells identified.  B. Hypocellular degenerating specimen with mesothelial cells and  inflammatory cells.    MRI brain:  Not indicated    Cardiac testing:  Transthoracic echo 12/6/2022:    Left ventricular ejection fraction appears to be 56 - 60%.    Left ventricular wall thickness is consistent with mild concentric hypertrophy.    Left ventricular diastolic function is consistent with (grade Ia w/high LAP) impaired relaxation.    Normal right ventricular cavity size and systolic function noted.     The left atrial cavity is mildly dilated.    There is mild pulmonic valve regurgitation present.    There is no evidence of pericardial effusion.    Duplex renal arterial bilateral 1/31/2022:  Mild-moderate right renal artery stenosis.  Normal left renal artery.    Pulmonary Function Test:  Not done        ASSESSMENT & PLAN:  Mark Ken is a 73 y.o. male with significant medical conditions as mentioned above presented to my clinic.    Diagnosis: Siewert type III gastroesophageal adenocarcinoma  Staging: Stage IB (bO2T8G5)    Siewert type III gastroesophageal cancer are typically treated similar as gastric cancer.  He received 3 cycles of FOLFOX and tolerated very well.  There is no evidence of distant metastases on restaging PET/CT scan.    I will be available to assist Dr. Mckenzie for surgical resection which would likely require total gastrectomy, partial esophagectomy and Steffany-en-Y reconstruction.    I will refer him to Dr. Mckenzie for further discussion of the surgery.     I discussed the patients findings and my recommendations with the patient. The patient was given adequate time to ask questions and all questions were answered to patient satisfaction.     Berry Varela MD  Thoracic Surgeon  [facility]        Dictated utilizing Dragon dictation    I spent 40 minutes caring for Mark on this date of service. This time includes time spent by me in the following activities:preparing for the visit, reviewing tests, obtaining and/or reviewing a separately obtained history, performing a medically appropriate examination and/or evaluation , counseling and educating the patient/family/caregiver, ordering medications, tests, or procedures, referring and communicating with other health care professionals , documenting information in the medical record, independently interpreting results and communicating that information with the patient/family/caregiver, and care coordination and more  than half the time was spent in direct face to face evaluation and decision making.     Transcribed from ambient dictation for Berry Varela MD by Lisa Christie.  08/17/23   10:53 EDT    Patient or patient representative verbalized consent to the visit recording.  I have personally performed the services described in this document as transcribed by the above individual, and it is both accurate and complete.

## 2023-08-17 NOTE — PROGRESS NOTES
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 male has significant medical problems as mentioned below.  He reported that he initially thought he had food poisoning and was evaluated by a physician and was recommended colonoscopy and upper endoscopy. EGD was done on 4/27/2023 and found mucosal changes characterized by ulceration along the lesser curvature.  Biopsy was taken and confirmed moderately to poorly differentiated adenocarcinoma arising in the background of intestinal metaplasia with high-grade dysplasia. PET/CT on 5/23/2023 reported low activity in a short segment along the lesser curvature of the stomach likely presenting site of gastric malignancy.  There was no evidence of distant or regional metastasis. He was referred to Dr. Mckenzie for surgical management of gastric cancer.  He underwent repeat EGD and mucosal abnormality consistent with prior gastric carcinoma diagnosis was noted 4 cm distal to the gastroesophageal junction along the lesser curvature.  Diagnostic laparoscopy did not reveal peritoneal disease and peritoneal lavage was also negative for malignant cells. Due to its close proximity to the gastroesophageal junction, he was referred to thoracic surgery for further evaluation.    At the time of initial presentation, he did not have any symptoms and denied losing weight. He has not had any abdominal surgery prior to his surgery with Dr. Mckenzie. He denied problems with his chest or lung. He never smoked and did not drink alcohol regularly. He had been taking omeprazole for acid reflux. He never had a heart attack. He drives, walks, and is generally active. Before he retired he was in the [employer] business.     ECOG 0    After the initial consultation, EGD and EUS was performed by   Ayesha on 6/21/2023 and reported gastric mass with invasion into the muscularis propria.  There were 3 small lymph nodes at the celiac axis which were visualized.  The largest measured 6 mm in diameter and fine-needle biopsy was performed which was negative for malignancy.  I discussed this case with Dr. Ludwig and we planned neoadjuvant chemotherapy followed by restaging PET/CT. He received 3 cycles of FOLFOX and repeat PET/CT was performed on 8/14/2023 which reported resolution of prior hypermetabolism along the gastric lesser curvature.  There is subcentimeter upper abdominal and retroperitoneal lymph nodes which are too small to be characterized by PET/CT.  He tolerated the chemotherapy very well.  He received total 4 cycles of FOLFOX and the last chemotherapy cycle was on 8/9/2023.  Restaging PET/CT on 8/14/2023 reported resolution of prior focal mild hypermetabolism along the gastric lesser curvature.  There is subcentimeter upper abdominal and retroperitoneal lymph nodes that were too small to be accurately characterized by PET CT scan.  There was no evidence of distant metastases.    Past Medical History:   Diagnosis Date    BPH (benign prostatic hyperplasia)     Depression     Diabetes mellitus     GERD (gastroesophageal reflux disease)     Gout     Hyperlipidemia     Hypertension     Neuropathy     bilat feet    Pulmonary arterial hypertension     Restless legs     Stomach cancer        Past Surgical History:   Procedure Laterality Date    CARPAL TUNNEL RELEASE Left 01/19/2023    Procedure: LEFT CARPAL TUNNEL RELEASE AND SYNOVIAL BIOPSY;  Surgeon: Mikel Dupont MD;  [place] [facility] [place]  Service: Hand;  Laterality: Left;    CARPAL TUNNEL RELEASE Right 03/02/2023    Procedure: RIGHT CARPAL TUNNEL RELEASE;  Surgeon: Mikel Dupont MD;  [place] [facility] [place]  Service: Hand;  Laterality: Right;    COLONOSCOPY N/A 03/09/2023    DIAGNOSTIC LAPAROSCOPY N/A 5/30/2023    Procedure: DIAGNOSTIC  LAPAROSCOPY with peritoneal washing;  Surgeon: Tito Mckenzie MD;  Location: [facility] MAIN OR;  Service: General;  Laterality: N/A;    ENDOSCOPY N/A 04/27/2023    Dr. Reilly    ENDOSCOPY N/A 5/30/2023    Procedure: ESOPHAGOGASTRODUODENOSCOPY;  Surgeon: Tito Mckenzie MD;  Location: [facility] MAIN OR;  Service: General;  Laterality: N/A;    HAND SURGERY Left     KNEE ARTHROSCOPY Bilateral     TOTAL KNEE ARTHROPLASTY Right 02/21/2018    UPPER ENDOSCOPIC ULTRASOUND W/ FNA N/A 6/21/2023    Procedure: ENDOSCOPIC ULTRASOUND WITH STAGING AND FINE NEEDLE ASPIRATION;  Surgeon: Kavon Hodge MD;  Location: [facility] ENDOSCOPY;  Service: Gastroenterology;  Laterality: N/A;  Post:    VENOUS ACCESS DEVICE (PORT) INSERTION N/A 6/23/2023    Procedure: INSERTION VENOUS ACCESS DEVICE;  Surgeon: Berry Varela MD;  Location: [facility] MAIN OR;  Service: Thoracic;  Laterality: N/A;       Family History   Problem Relation Age of Onset    Malig Hyperthermia Neg Hx        Social History     Socioeconomic History    Marital status:    Tobacco Use    Smoking status: Never     Passive exposure: Never    Smokeless tobacco: Never   Vaping Use    Vaping Use: Never used   Substance and Sexual Activity    Alcohol use: Yes     Comment: once a month  one  Esha/with Mexican Dinner    Drug use: Yes     Types: Marijuana     Comment: thc gummies for sleep, 2 months ago    Sexual activity: Not Currently     Partners: Female     Birth control/protection: Vasectomy         Current Outpatient Medications:     allopurinol (ZYLOPRIM) 300 MG tablet, Take 1 tablet by mouth Daily., Disp: , Rfl:     amLODIPine (NORVASC) 10 MG tablet, Take 1 tablet by mouth Daily., Disp: , Rfl:     amLODIPine (NORVASC) 5 MG tablet, Take 1 tablet by mouth Daily., Disp: 90 tablet, Rfl: 3    DULoxetine (CYMBALTA) 60 MG capsule, Take 1 capsule by mouth Daily., Disp: , Rfl:     lisinopril (PRINIVIL,ZESTRIL) 40 MG tablet, Take 1 tablet by mouth Daily., Disp: , Rfl:      "metFORMIN (GLUCOPHAGE) 1000 MG tablet, Take 1 tablet by mouth 2 (Two) Times a Day With Meals. Hold dos, Disp: , Rfl:     metoprolol succinate XL (TOPROL-XL) 50 MG 24 hr tablet, Take 1 tablet by mouth Daily., Disp: 30 tablet, Rfl: 3    omeprazole (priLOSEC) 20 MG capsule, Take 1 capsule by mouth Daily., Disp: 30 capsule, Rfl: 3    potassium chloride (K-DUR,KLOR-CON) 20 MEQ CR tablet, Take 1 tablet by mouth 2 (Two) Times a Day., Disp: 60 tablet, Rfl: 5    predniSONE (DELTASONE) 1 MG tablet, Take 3 tablets by mouth Daily., Disp: , Rfl:     predniSONE (DELTASONE) 5 MG tablet, Take 1 tablet by mouth Daily., Disp: , Rfl:     rOPINIRole (REQUIP) 2 MG tablet, Take 1 tablet by mouth Every 12 (Twelve) Hours. (Patient taking differently: Take 1 tablet by mouth Every Night.), Disp: 60 tablet, Rfl: 3    spironolactone (ALDACTONE) 25 MG tablet, Take 1 tablet by mouth Daily., Disp: , Rfl:     tamsulosin (FLOMAX) 0.4 MG capsule 24 hr capsule, Take 1 capsule by mouth Daily., Disp: , Rfl:     valsartan (DIOVAN) 160 MG tablet, Take 1 tablet by mouth Daily., Disp: 90 tablet, Rfl: 3     No Known Allergies          Objective    OBJECTIVE:     VITAL SIGNS:  BP (!) 168/102 (BP Location: Left arm, Patient Position: Sitting, Cuff Size: Adult)   Pulse 80   Ht 172 cm (67.72\")   Wt 88.5 kg (195 lb)   SpO2 97%   BMI 29.90 kg/mý     PHYSICAL EXAM:  Constitutional:       Appearance: Normal appearance.   HENT:      Head: Normocephalic.      Right Ear: External ear normal.      Left Ear: External ear normal.      Nose: Nose normal.      Mouth/Throat: No obvious deformity     Pharynx: Oropharynx is clear.   Eyes:      Conjunctiva/sclera: Conjunctivae normal.   Cardiovascular:      Rate and Rhythm: Normal rate.      Pulses: Normal pulses.   Pulmonary:      Effort: Pulmonary effort is normal.   Abdominal:      Palpations: Abdomen is soft.   Musculoskeletal:         General: Normal range of motion.      Cervical back: Normal range of motion. "   Skin:     General: Skin is warm.   Neurological:      General: No focal deficit present.      Mental Status: He is alert and oriented to person, place, and time.     LAB RESULTS:  I have reviewed all the available laboratory results in the chart.    RESULTS REVIEW:  I have reviewed the patient's all relevant laboratory and imaging findings.     IMAGING RESULTS:    EGD 4/27/23:           CT chest, abdomen and pelvis 5/4/2023:  1. Masslike circumferential thickening of the lesser curvature of the stomach with a central area of ulceration. The patient has a known diagnosis of gastric carcinoma.   2. Prominent gastric hepatic ligament lymph nodes concerning for metastatic disease.   3. No evidence of distant metastatic disease.     PET/ CT 5/23/2023:  1. Low level activity at a short segment of the lesser curvature of the stomach likely representing a site of gastric malignancy. The nodes within the abdomen have very low-level metabolic activity.  2. Shotty nodes at both axilla and both groin regions with very low-level activity are likely benign, but conservative surveillance with CT is recommended.    EGD 5/30/2023 by Dr. Tito Mckenzie:  Gastric carcinoma approximately 4 cm distal to the GE junction along the lesser curvature.    Diagnostic laparoscopy with peritoneal lavage 5/30/2023:  No evidence of peritoneal carcinomatosis, liver or omental implants.  Abdominal Fluid, Peritoneal Washing:   A. No definitive malignant cells identified.  B. Hypocellular degenerating specimen with mesothelial cells and  inflammatory cells.    MRI brain:  Not indicated    Cardiac testing:  Transthoracic echo 12/6/2022:    Left ventricular ejection fraction appears to be 56 - 60%.    Left ventricular wall thickness is consistent with mild concentric hypertrophy.    Left ventricular diastolic function is consistent with (grade Ia w/high LAP) impaired relaxation.    Normal right ventricular cavity size and systolic function noted.     The left atrial cavity is mildly dilated.    There is mild pulmonic valve regurgitation present.    There is no evidence of pericardial effusion.    Duplex renal arterial bilateral 1/31/2022:  Mild-moderate right renal artery stenosis.  Normal left renal artery.    Pulmonary Function Test:  Not done        ASSESSMENT & PLAN:  Mark Ken is a 73 y.o. male with significant medical conditions as mentioned above presented to my clinic.    Diagnosis: Siewert type III gastroesophageal adenocarcinoma  Staging: Stage IB ([id number])    Siewert type III gastroesophageal cancer are typically treated similar as gastric cancer.  He received 3 cycles of FOLFOX and tolerated very well.  There is no evidence of distant metastases on restaging PET/CT scan.    I will be available to assist Dr. Mckenzie for surgical resection which would likely require total gastrectomy, partial esophagectomy and Steffany-en-Y reconstruction.    I will refer him to Dr. Mckenzie for further discussion of the surgery.     I discussed the patients findings and my recommendations with the patient. The patient was given adequate time to ask questions and all questions were answered to patient satisfaction.     Berry Varela MD  Thoracic Surgeon  [facility]        Dictated utilizing Dragon dictation    I spent 40 minutes caring for Mark on this date of service. This time includes time spent by me in the following activities:preparing for the visit, reviewing tests, obtaining and/or reviewing a separately obtained history, performing a medically appropriate examination and/or evaluation , counseling and educating the patient/family/caregiver, ordering medications, tests, or procedures, referring and communicating with other health care professionals , documenting information in the medical record, independently interpreting results and communicating that information with the patient/family/caregiver, and care coordination and more  than half the time was spent in direct face to face evaluation and decision making.     Transcribed from ambient dictation for Berry Varela MD by Lisa Christie.  08/17/23   10:53 EDT    Patient or patient representative verbalized consent to the visit recording.  I have personally performed the services described in this document as transcribed by the above individual, and it is both accurate and complete.

## 2023-08-23 ENCOUNTER — OFFICE VISIT (OUTPATIENT)
Dept: SURGERY | Facility: CLINIC | Age: 73
End: 2023-08-23
Payer: MEDICARE

## 2023-08-23 VITALS
HEIGHT: 68 IN | SYSTOLIC BLOOD PRESSURE: 150 MMHG | BODY MASS INDEX: 30.22 KG/M2 | WEIGHT: 199.4 LBS | DIASTOLIC BLOOD PRESSURE: 88 MMHG

## 2023-08-23 DIAGNOSIS — C16.0 MALIGNANT NEOPLASM OF CARDIA OF STOMACH: Primary | ICD-10-CM

## 2023-08-23 PROCEDURE — 99214 OFFICE O/P EST MOD 30 MIN: CPT | Performed by: SURGERY

## 2023-08-23 PROCEDURE — 1160F RVW MEDS BY RX/DR IN RCRD: CPT | Performed by: SURGERY

## 2023-08-23 PROCEDURE — 1159F MED LIST DOCD IN RCRD: CPT | Performed by: SURGERY

## 2023-08-24 ENCOUNTER — PREP FOR SURGERY (OUTPATIENT)
Dept: OTHER | Facility: HOSPITAL | Age: 73
End: 2023-08-24
Payer: MEDICARE

## 2023-08-24 DIAGNOSIS — C16.0 MALIGNANT NEOPLASM OF CARDIA OF STOMACH: Primary | ICD-10-CM

## 2023-08-24 DIAGNOSIS — R94.5 ABNORMAL RESULTS OF LIVER FUNCTION STUDIES: ICD-10-CM

## 2023-08-24 PROBLEM — C16.9 GASTRIC CANCER: Status: ACTIVE | Noted: 2023-08-24

## 2023-08-24 RX ORDER — CEFAZOLIN SODIUM 2 G/100ML
2000 INJECTION, SOLUTION INTRAVENOUS ONCE
OUTPATIENT
Start: 2023-09-13 | End: 2023-08-24

## 2023-08-24 RX ORDER — SODIUM CHLORIDE 0.9 % (FLUSH) 0.9 %
10 SYRINGE (ML) INJECTION AS NEEDED
OUTPATIENT
Start: 2023-09-13

## 2023-08-24 RX ORDER — SODIUM CHLORIDE 0.9 % (FLUSH) 0.9 %
10 SYRINGE (ML) INJECTION EVERY 12 HOURS SCHEDULED
OUTPATIENT
Start: 2023-09-13

## 2023-08-24 RX ORDER — SODIUM CHLORIDE 9 MG/ML
40 INJECTION, SOLUTION INTRAVENOUS AS NEEDED
OUTPATIENT
Start: 2023-09-13

## 2023-08-25 NOTE — PROGRESS NOTES
ASSESSMENT/PLAN:    72-year-old gentleman with probable stage Ib (T2 N0 M0) gastric carcinoma 4 cm distal to the GE junction on the lesser curvature treated with neoadjuvant chemotherapy and posttreatment PET scan showing complete response.  Case discussed with Dr. Ludwig and Dr. Varela.  Plan is now to proceed with esophagogastrectomy.  Details of approach and timing of surgery discussed with Dr. Varela.  Discussed with patient and family that we will proceed with open total gastrectomy, thoracic approach to partial esophagectomy, intrathoracic esophagojejunal anastomosis and jejunal feeding tube.  They understand the rationale for the procedure, the nature of the procedure, and risks including but not limited to bleeding, infection, and anastomotic leak and wished to proceed as outlined.    CC:     Gastric cancer    HPI:    72-year-old gentleman diagnosed with gastric carcinoma on EGD 4/27/2023.  After further work-up, it was elected to proceed with neoadjuvant chemotherapy which he has now completed.  Relevant review of systems negative other than presenting complaints.    ENDOSCOPY:   Colonoscopy 3/9/2023 Dr. Reilly: cecal polyp - fragments of hyperplastic polyp and tubular adenoma, rectal polyp - hyperplastic polyp  EGD 4/27/2023 Dr. Reilly: Scattered mild mucosal changes characterized by granularity were found at the gastroesophageal junction, diffuse mild mucosal changes characterized by atrophy found in the gastric body, localized severe mucosal changes characterized by ulceration were found in the posterior wall of the gastric body  Gastric ulcer biopsy: moderately to poorly differentiated adenocarcinoma arising in a background of intestinal metaplasia with high-grade dysplasia, gastric ulcer with necroinflammatory exudate present, [id number] negative  Random gastric biopsy: mild chronic gastritis, intestinal metaplasia present, no dysplasia identified, H. pylori negative  Random lower esophagus biopsy:  squamous mucosa with reactive changes, no increased intraepithelial eosinophils identified  Endoscopic ultrasound 6/21/2023 Dr. Hodge: Gastric mass with invasion into the muscularis propria.  Likely T2 lesion but T3 lesion cannot be fully ruled out.  3 small lymph nodes not overly hypoechoic.  Pathology gastrohepatic lymph node benign.    RADIOLOGY:   1/10/2023 CT Abdomen/Pelvis with & without contrast: No renal calculi, hydronephrosis or suspicious renal mass. Diffuse hepatic steatosis. Colonic diverticulosis. Umbilical hernia.  5/4/2023 CT Chest/Abdomen/Pelvis: hepatic steatosis, colonic diverticulosis, masslike circumferential wall thickening along the lesser curvature of the stomach measuring up to 1.2cm in thickness, measures at least 7cm in diameter, central area of presumed ulceration measuring approximately 1.7cm in diameter, prominent gastric hepatic ligament lymph nodes concerning for metastatic disease, no evidence of distant metastatic disease  5/23/2023 PET/CT: Low-level activity at a short segment of the lesser curvature of the stomach likely representing a site of gastric malignancy, nodes within the abdomen have very low-level metabolic activity, shotty nodes in both axilla and both groin regions with very low-level activity are likely benign  8/14/2023 PET/CT: Resolution of prior focal hypermetabolism along the gastric lesser curvature.  Subcentimeter upper abdominal thickening with nodes remain too small for accurate PET characterization.    LABS:    CMP 8/8/2023: Glucose 109, otherwise normal  CBC 8/8/2023: WBC 5.8, hemoglobin 4.8, platelets 134    SOCIAL HISTORY:   Denies tobacco use  Occasional alcohol use  Reports occasional marijuana use    FAMILY HISTORY:    Colorectal cancer: Sister    PREVIOUS ABDOMINAL SURGERY    EGD and laparoscopy with peritoneal washings 5/30/2023: Gastric carcinoma approximately 4 cm distal to the GE junction along the lesser curvature, peritoneal washings were  negative for malignant cells    PAST MEDICAL HISTORY:    BPH  Depression  Diabetes  Gout  Hypertension  Hyperlipidemia  Peripheral neuropathy  Pulmonary hypertension  Restless legs    MEDICATIONS:   Allopurinol  Cymbalta  Metformin  Metoprolol  Omeprazole  Potassium  Prednisone  Spironolactone  Flomax  Valsartan    ALLERGIES:   None    PHYSICAL EXAM:   Constitutional: Well-developed well-nourished, no acute distress  Vital signs:   Weight: 199 pounds  Height: 68 inches  BMI: 30.3  Blood pressure 150/88  Neck: Supple, no palpable mass, trachea midline  Respiratory: Normal nonlabored inspiratory effort  Cardiovascular: Regular rate, no jugular venous distention  Gastrointestinal: Soft, nontender  Psychiatric: Alert and oriented x3, normal affect     ALICIA IBARRA M.D.

## 2023-08-25 NOTE — H&P (VIEW-ONLY)
ASSESSMENT/PLAN:    72-year-old gentleman with probable stage Ib (T2 N0 M0) gastric carcinoma 4 cm distal to the GE junction on the lesser curvature treated with neoadjuvant chemotherapy and posttreatment PET scan showing complete response.  Case discussed with Dr. Ludwig and Dr. Varela.  Plan is now to proceed with esophagogastrectomy.  Details of approach and timing of surgery discussed with Dr. Varela.  Discussed with patient and family that we will proceed with open total gastrectomy, thoracic approach to partial esophagectomy, intrathoracic esophagojejunal anastomosis and jejunal feeding tube.  They understand the rationale for the procedure, the nature of the procedure, and risks including but not limited to bleeding, infection, and anastomotic leak and wished to proceed as outlined.    CC:     Gastric cancer    HPI:    72-year-old gentleman diagnosed with gastric carcinoma on EGD 4/27/2023.  After further work-up, it was elected to proceed with neoadjuvant chemotherapy which he has now completed.  Relevant review of systems negative other than presenting complaints.    ENDOSCOPY:   Colonoscopy 3/9/2023 Dr. Reilly: cecal polyp - fragments of hyperplastic polyp and tubular adenoma, rectal polyp - hyperplastic polyp  EGD 4/27/2023 Dr. Reilly: Scattered mild mucosal changes characterized by granularity were found at the gastroesophageal junction, diffuse mild mucosal changes characterized by atrophy found in the gastric body, localized severe mucosal changes characterized by ulceration were found in the posterior wall of the gastric body  Gastric ulcer biopsy: moderately to poorly differentiated adenocarcinoma arising in a background of intestinal metaplasia with high-grade dysplasia, gastric ulcer with necroinflammatory exudate present, [id number] negative  Random gastric biopsy: mild chronic gastritis, intestinal metaplasia present, no dysplasia identified, H. pylori negative  Random lower esophagus biopsy:  squamous mucosa with reactive changes, no increased intraepithelial eosinophils identified  Endoscopic ultrasound 6/21/2023 Dr. Hodge: Gastric mass with invasion into the muscularis propria.  Likely T2 lesion but T3 lesion cannot be fully ruled out.  3 small lymph nodes not overly hypoechoic.  Pathology gastrohepatic lymph node benign.    RADIOLOGY:   1/10/2023 CT Abdomen/Pelvis with & without contrast: No renal calculi, hydronephrosis or suspicious renal mass. Diffuse hepatic steatosis. Colonic diverticulosis. Umbilical hernia.  5/4/2023 CT Chest/Abdomen/Pelvis: hepatic steatosis, colonic diverticulosis, masslike circumferential wall thickening along the lesser curvature of the stomach measuring up to 1.2cm in thickness, measures at least 7cm in diameter, central area of presumed ulceration measuring approximately 1.7cm in diameter, prominent gastric hepatic ligament lymph nodes concerning for metastatic disease, no evidence of distant metastatic disease  5/23/2023 PET/CT: Low-level activity at a short segment of the lesser curvature of the stomach likely representing a site of gastric malignancy, nodes within the abdomen have very low-level metabolic activity, shotty nodes in both axilla and both groin regions with very low-level activity are likely benign  8/14/2023 PET/CT: Resolution of prior focal hypermetabolism along the gastric lesser curvature.  Subcentimeter upper abdominal thickening with nodes remain too small for accurate PET characterization.    LABS:    CMP 8/8/2023: Glucose 109, otherwise normal  CBC 8/8/2023: WBC 5.8, hemoglobin 4.8, platelets 134    SOCIAL HISTORY:   Denies tobacco use  Occasional alcohol use  Reports occasional marijuana use    FAMILY HISTORY:    Colorectal cancer: Sister    PREVIOUS ABDOMINAL SURGERY    EGD and laparoscopy with peritoneal washings 5/30/2023: Gastric carcinoma approximately 4 cm distal to the GE junction along the lesser curvature, peritoneal washings were  negative for malignant cells    PAST MEDICAL HISTORY:    BPH  Depression  Diabetes  Gout  Hypertension  Hyperlipidemia  Peripheral neuropathy  Pulmonary hypertension  Restless legs    MEDICATIONS:   Allopurinol  Cymbalta  Metformin  Metoprolol  Omeprazole  Potassium  Prednisone  Spironolactone  Flomax  Valsartan    ALLERGIES:   None    PHYSICAL EXAM:   Constitutional: Well-developed well-nourished, no acute distress  Vital signs:   Weight: 199 pounds  Height: 68 inches  BMI: 30.3  Blood pressure 150/88  Neck: Supple, no palpable mass, trachea midline  Respiratory: Normal nonlabored inspiratory effort  Cardiovascular: Regular rate, no jugular venous distention  Gastrointestinal: Soft, nontender  Psychiatric: Alert and oriented ×3, normal affect     ALICIA IBARRA M.D.

## 2023-09-06 ENCOUNTER — PRE-ADMISSION TESTING (OUTPATIENT)
Dept: PREADMISSION TESTING | Facility: HOSPITAL | Age: 73
End: 2023-09-06
Payer: MEDICARE

## 2023-09-06 ENCOUNTER — HOSPITAL ENCOUNTER (OUTPATIENT)
Dept: GENERAL RADIOLOGY | Facility: HOSPITAL | Age: 73
Discharge: HOME | End: 2023-09-06
Payer: MEDICARE

## 2023-09-06 VITALS
RESPIRATION RATE: 18 BRPM | WEIGHT: 206.1 LBS | BODY MASS INDEX: 32.35 KG/M2 | HEART RATE: 83 BPM | SYSTOLIC BLOOD PRESSURE: 192 MMHG | OXYGEN SATURATION: 98 % | DIASTOLIC BLOOD PRESSURE: 99 MMHG | HEIGHT: 67 IN | TEMPERATURE: 98.8 F

## 2023-09-06 DIAGNOSIS — C16.0 MALIGNANT NEOPLASM OF CARDIA OF STOMACH: ICD-10-CM

## 2023-09-06 LAB
ABO GROUP BLD: NORMAL
ALBUMIN SERPL-MCNC: 3.8 G/DL (ref 3.5–5.2)
ALBUMIN/GLOB SERPL: 1.4 G/DL
ALP SERPL-CCNC: 83 U/L (ref 39–117)
ALT SERPL W P-5'-P-CCNC: 18 U/L (ref 1–41)
ANION GAP SERPL CALCULATED.3IONS-SCNC: 14 MMOL/L (ref 5–15)
AST SERPL-CCNC: 16 U/L (ref 1–40)
BASOPHILS # BLD AUTO: 0.03 10*3/MM3 (ref 0–0.2)
BASOPHILS NFR BLD AUTO: 0.4 % (ref 0–1.5)
BILIRUB SERPL-MCNC: 0.3 MG/DL (ref 0–1.2)
BLD GP AB SCN SERPL QL: NEGATIVE
BUN SERPL-MCNC: 13 MG/DL (ref 8–23)
BUN/CREAT SERPL: 15.9 (ref 7–25)
CALCIUM SPEC-SCNC: 9.6 MG/DL (ref 8.6–10.5)
CHLORIDE SERPL-SCNC: 103 MMOL/L (ref 98–107)
CO2 SERPL-SCNC: 23 MMOL/L (ref 22–29)
CREAT SERPL-MCNC: 0.82 MG/DL (ref 0.76–1.27)
DEPRECATED RDW RBC AUTO: 67.7 FL (ref 37–54)
EGFRCR SERPLBLD CKD-EPI 2021: 93.3 ML/MIN/1.73
EOSINOPHIL # BLD AUTO: 0.16 10*3/MM3 (ref 0–0.4)
EOSINOPHIL NFR BLD AUTO: 2.1 % (ref 0.3–6.2)
ERYTHROCYTE [DISTWIDTH] IN BLOOD BY AUTOMATED COUNT: 20.6 % (ref 12.3–15.4)
GLOBULIN UR ELPH-MCNC: 2.7 GM/DL
GLUCOSE SERPL-MCNC: 146 MG/DL (ref 65–99)
HCT VFR BLD AUTO: 39.7 % (ref 37.5–51)
HGB BLD-MCNC: 13.2 G/DL (ref 13–17.7)
IMM GRANULOCYTES # BLD AUTO: 0.05 10*3/MM3 (ref 0–0.05)
IMM GRANULOCYTES NFR BLD AUTO: 0.7 % (ref 0–0.5)
INR PPP: 0.96 (ref 0.9–1.1)
LYMPHOCYTES # BLD AUTO: 2.37 10*3/MM3 (ref 0.7–3.1)
LYMPHOCYTES NFR BLD AUTO: 31.7 % (ref 19.6–45.3)
MCH RBC QN AUTO: 30.1 PG (ref 26.6–33)
MCHC RBC AUTO-ENTMCNC: 33.2 G/DL (ref 31.5–35.7)
MCV RBC AUTO: 90.4 FL (ref 79–97)
MONOCYTES # BLD AUTO: 0.73 10*3/MM3 (ref 0.1–0.9)
MONOCYTES NFR BLD AUTO: 9.8 % (ref 5–12)
NEUTROPHILS NFR BLD AUTO: 4.13 10*3/MM3 (ref 1.7–7)
NEUTROPHILS NFR BLD AUTO: 55.3 % (ref 42.7–76)
NRBC BLD AUTO-RTO: 0 /100 WBC (ref 0–0.2)
PLATELET # BLD AUTO: 198 10*3/MM3 (ref 140–450)
PMV BLD AUTO: 10.6 FL (ref 6–12)
POTASSIUM SERPL-SCNC: 3.6 MMOL/L (ref 3.5–5.2)
PROT SERPL-MCNC: 6.5 G/DL (ref 6–8.5)
PROTHROMBIN TIME: 12.9 SECONDS (ref 11.7–14.2)
QT INTERVAL: 400 MS
QTC INTERVAL: 420 MS
RBC # BLD AUTO: 4.39 10*6/MM3 (ref 4.14–5.8)
RH BLD: POSITIVE
SODIUM SERPL-SCNC: 140 MMOL/L (ref 136–145)
T&S EXPIRATION DATE: NORMAL
WBC NRBC COR # BLD: 7.47 10*3/MM3 (ref 3.4–10.8)

## 2023-09-06 PROCEDURE — 85025 COMPLETE CBC W/AUTO DIFF WBC: CPT

## 2023-09-06 PROCEDURE — 85610 PROTHROMBIN TIME: CPT | Performed by: SURGERY

## 2023-09-06 PROCEDURE — 86901 BLOOD TYPING SEROLOGIC RH(D): CPT | Performed by: SURGERY

## 2023-09-06 PROCEDURE — 80053 COMPREHEN METABOLIC PANEL: CPT | Performed by: SURGERY

## 2023-09-06 PROCEDURE — 36415 COLL VENOUS BLD VENIPUNCTURE: CPT

## 2023-09-06 PROCEDURE — 93005 ELECTROCARDIOGRAM TRACING: CPT

## 2023-09-06 PROCEDURE — 86850 RBC ANTIBODY SCREEN: CPT | Performed by: SURGERY

## 2023-09-06 PROCEDURE — 71046 X-RAY EXAM CHEST 2 VIEWS: CPT

## 2023-09-06 PROCEDURE — 86900 BLOOD TYPING SEROLOGIC ABO: CPT | Performed by: SURGERY

## 2023-09-06 PROCEDURE — 93010 ELECTROCARDIOGRAM REPORT: CPT | Performed by: INTERNAL MEDICINE

## 2023-09-06 RX ORDER — CHLORHEXIDINE GLUCONATE 500 MG/1
1 CLOTH TOPICAL TAKE AS DIRECTED
COMMUNITY
End: 2023-09-18 | Stop reason: HOSPADM

## 2023-09-06 NOTE — DISCHARGE INSTRUCTIONS
Take the following medications the morning of surgery:  METOPROLOL, DULOXETINE, OMEPRAZOLE    Arrive to hospital on your day of surgery at 5:30 AM.      If you are on prescription narcotic pain medication to control your pain you may also take that medication the morning of surgery.    General Instructions:  Do not eat solid food after midnight the night before surgery.  You may drink clear liquids day of surgery but must stop at least one hour before your hospital arrival time.  It is beneficial for you to have a clear drink that contains carbohydrates the day of surgery.  We suggest a 12 to 20 ounce bottle of Gatorade or Powerade for non-diabetic patients or a 12 to 20 ounce bottle of G2 or Powerade Zero for diabetic patients. (Pediatric patients, are not advised to drink a 12 to 20 ounce carbohydrate drink)    Clear liquids are liquids you can see through.  Nothing red in color.     Plain water                               Sports drinks  Sodas                                   Gelatin (Jell-O)  Fruit juices without pulp such as white grape juice and apple juice  Popsicles that contain no fruit or yogurt  Tea or coffee (no cream or milk added)  Gatorade / Powerade  G2 / Powerade Zero    Infants may have breast milk up to four hours before surgery.  Infants drinking formula may drink formula up to six hours before surgery.   Patients who avoid smoking, chewing tobacco and alcohol for 4 weeks prior to surgery have a reduced risk of post-operative complications.  Quit smoking as many days before surgery as you can.  Do not smoke, use chewing tobacco or drink alcohol the day of surgery.   If applicable bring your C-PAP/ BI-PAP machine in with you to preop day of surgery.  Bring any papers given to you in the doctor’s office.  Wear clean comfortable clothes.  Do not wear contact lenses, false eyelashes or make-up.  Bring a case for your glasses.   Bring crutches or walker if applicable.  Remove all piercings.  Leave  jewelry and any other valuables at home.  Hair extensions with metal clips must be removed prior to surgery.  The Pre-Admission Testing nurse will instruct you to bring medications if unable to obtain an accurate list in Pre-Admission Testing.        If you were given a blood bank ID arm band remember to bring it with you the day of surgery.    Preventing a Surgical Site Infection:  For 2 to 3 days before surgery, avoid shaving with a razor because the razor can irritate skin and make it easier to develop an infection.    Any areas of open skin can increase the risk of a post-operative wound infection by allowing bacteria to enter and travel throughout the body.  Notify your surgeon if you have any skin wounds / rashes even if it is not near the expected surgical site.  The area will need assessed to determine if surgery should be delayed until it is healed.  The night prior to surgery shower using a fresh bar of anti-bacterial soap (such as Dial) and clean washcloth.  Sleep in a clean bed with clean clothing.  Do not allow pets to sleep with you.  Shower on the morning of surgery using a fresh bar of anti-bacterial soap (such as Dial) and clean washcloth.  Dry with a clean towel and dress in clean clothing.  Ask your surgeon if you will be receiving antibiotics prior to surgery.  Make sure you, your family, and all healthcare providers clean their hands with soap and water or an alcohol based hand  before caring for you or your wound.    Day of surgery:  Your arrival time is approximately two hours before your scheduled surgery time.  Upon arrival, a Pre-op nurse and Anesthesiologist will review your health history, obtain vital signs, and answer questions you may have.  The only belongings needed at this time will be a list of your home medications and if applicable your C-PAP/BI-PAP machine.  A Pre-op nurse will start an IV and you may receive medication in preparation for surgery, including something to  help you relax.     Please be aware that surgery does come with discomfort.  We want to make every effort to control your discomfort so please discuss any uncontrolled symptoms with your nurse.   Your doctor will most likely have prescribed pain medications.      If you are going home after surgery you will receive individualized written care instructions before being discharged.  A responsible adult must drive you to and from the hospital on the day of your surgery and stay with you for 24 hours.  Discharge prescriptions can be filled by the hospital pharmacy during regular pharmacy hours.  If you are having surgery late in the day/evening your prescription may be e-prescribed to your pharmacy.  Please verify your pharmacy hours or chose a 24 hour pharmacy to avoid not having access to your prescription because your pharmacy has closed for the day.    If you are staying overnight following surgery, you will be transported to your hospital room following the recovery period.  [facility] has all private rooms.    If you have any questions please call Pre-Admission Testing at [phone].  Deductibles and co-payments are collected on the day of service. Please be prepared to pay the required co-pay, deductible or deposit on the day of service as defined by your plan.    Call your surgeon immediately if you experience any of the following symptoms:  Sore Throat  Shortness of Breath or difficulty breathing  Cough  Chills  Body soreness or muscle pain  Headache  Fever  New loss of taste or smell  Do not arrive for your surgery ill.  Your procedure will need to be rescheduled to another time.  You will need to call your physician before the day of surgery to avoid any unnecessary exposure to hospital staff as well as other patients.    CHLORHEXIDINE CLOTH INSTRUCTIONS  The morning of surgery follow these instructions using the Chlorhexidine cloths you've been given.  These steps reduce bacteria on the  body.  Do not use the cloths near your eyes, ears mouth, genitalia or on open wounds.  Throw the cloths away after use but do not try to flush them down a toilet.      Open and remove one cloth at a time from the package.    Leave the cloth unfolded and begin the bathing.  Massage the skin with the cloths using gentle pressure to remove bacteria.  Do not scrub harshly.   Follow the steps below with one 2% CHG cloth per area (6 total cloths).  One cloth for neck, shoulders and chest.  One cloth for both arms, hands, fingers and underarms (do underarms last).  One cloth for the abdomen followed by groin.  One cloth for right leg and foot including between the toes.  One cloth for left leg and foot including between the toes.  The last cloth is to be used for the back of the neck, back and buttocks.    Allow the CHG to air dry 3 minutes on the skin which will give it time to work and decrease the chance of irritation.  The skin may feel sticky until it is dry.  Do not rinse with water or any other liquid or you will lose the beneficial effects of the CHG.  If mild skin irritation occurs, do rinse the skin to remove the CHG.  Report this to the nurse at time of admission.  Do not apply lotions, creams, ointments, deodorants or perfumes after using the clothes. Dress in clean clothes before coming to the hospital.

## 2023-09-12 ENCOUNTER — ANESTHESIA EVENT (OUTPATIENT)
Dept: PERIOP | Facility: HOSPITAL | Age: 73
DRG: 327 | End: 2023-09-12
Payer: MEDICARE

## 2023-09-13 ENCOUNTER — ANESTHESIA (OUTPATIENT)
Dept: PERIOP | Facility: HOSPITAL | Age: 73
DRG: 327 | End: 2023-09-13
Payer: MEDICARE

## 2023-09-13 ENCOUNTER — HOSPITAL ENCOUNTER (INPATIENT)
Facility: HOSPITAL | Age: 73
LOS: 5 days | Discharge: HOME | DRG: 327 | End: 2023-09-18
Attending: SURGERY | Admitting: SURGERY
Payer: MEDICARE

## 2023-09-13 DIAGNOSIS — C16.0 MALIGNANT NEOPLASM OF CARDIA OF STOMACH: ICD-10-CM

## 2023-09-13 DIAGNOSIS — R94.5 ABNORMAL RESULTS OF LIVER FUNCTION STUDIES: ICD-10-CM

## 2023-09-13 LAB
GLUCOSE BLDC GLUCOMTR-MCNC: 130 MG/DL (ref 70–130)
GLUCOSE BLDC GLUCOMTR-MCNC: 157 MG/DL (ref 70–130)

## 2023-09-13 PROCEDURE — 25010000002 HYDRALAZINE PER 20 MG: Performed by: NURSE ANESTHETIST, CERTIFIED REGISTERED

## 2023-09-13 PROCEDURE — 25010000002 CEFAZOLIN IN DEXTROSE 2-4 GM/100ML-% SOLUTION: Performed by: SURGERY

## 2023-09-13 PROCEDURE — 82948 REAGENT STRIP/BLOOD GLUCOSE: CPT

## 2023-09-13 PROCEDURE — 25010000002 PROPOFOL 10 MG/ML EMULSION: Performed by: NURSE ANESTHETIST, CERTIFIED REGISTERED

## 2023-09-13 PROCEDURE — 25010000002 FENTANYL CITRATE (PF) 50 MCG/ML SOLUTION: Performed by: ANESTHESIOLOGY

## 2023-09-13 PROCEDURE — 25010000002 LABETALOL 5 MG/ML SOLUTION: Performed by: NURSE ANESTHETIST, CERTIFIED REGISTERED

## 2023-09-13 PROCEDURE — 25010000002 ONDANSETRON PER 1 MG: Performed by: NURSE ANESTHETIST, CERTIFIED REGISTERED

## 2023-09-13 PROCEDURE — C9290 INJ, BUPIVACAINE LIPOSOME: HCPCS | Performed by: SURGERY

## 2023-09-13 PROCEDURE — 25010000002 DROPERIDOL PER 5 MG: Performed by: NURSE ANESTHETIST, CERTIFIED REGISTERED

## 2023-09-13 PROCEDURE — 25010000002 HYDROCORTISONE SOD SUC (PF) 100 MG RECONSTITUTED SOLUTION: Performed by: SURGERY

## 2023-09-13 PROCEDURE — 43621 REMOVAL OF STOMACH: CPT | Performed by: SURGERY

## 2023-09-13 PROCEDURE — 25010000002 SUGAMMADEX 200 MG/2ML SOLUTION: Performed by: NURSE ANESTHETIST, CERTIFIED REGISTERED

## 2023-09-13 PROCEDURE — 44015 INSERT NEEDLE CATH BOWEL: CPT | Performed by: SURGERY

## 2023-09-13 PROCEDURE — 25010000002 KETOROLAC TROMETHAMINE PER 15 MG: Performed by: SURGERY

## 2023-09-13 PROCEDURE — 0D150ZA BYPASS ESOPHAGUS TO JEJUNUM, OPEN APPROACH: ICD-10-PCS | Performed by: SURGERY

## 2023-09-13 PROCEDURE — 88309 TISSUE EXAM BY PATHOLOGIST: CPT | Performed by: SURGERY

## 2023-09-13 PROCEDURE — 0 BUPIVACAINE LIPOSOME 1.3 % SUSPENSION 20 ML VIAL: Performed by: SURGERY

## 2023-09-13 PROCEDURE — 0DT60ZZ RESECTION OF STOMACH, OPEN APPROACH: ICD-10-PCS | Performed by: SURGERY

## 2023-09-13 PROCEDURE — 25010000002 FENTANYL CITRATE (PF) 50 MCG/ML SOLUTION: Performed by: NURSE ANESTHETIST, CERTIFIED REGISTERED

## 2023-09-13 PROCEDURE — 25010000002 HYDROMORPHONE 1 MG/ML SOLUTION: Performed by: SURGERY

## 2023-09-13 PROCEDURE — 25010000002 MAGNESIUM SULFATE PER 500 MG OF MAGNESIUM: Performed by: NURSE ANESTHETIST, CERTIFIED REGISTERED

## 2023-09-13 PROCEDURE — 25010000002 CEFAZOLIN IN DEXTROSE 2000 MG/ 100 ML SOLUTION: Performed by: SURGERY

## 2023-09-13 PROCEDURE — 25010000002 HYDROMORPHONE 1 MG/ML SOLUTION: Performed by: NURSE ANESTHETIST, CERTIFIED REGISTERED

## 2023-09-13 PROCEDURE — 88307 TISSUE EXAM BY PATHOLOGIST: CPT | Performed by: SURGERY

## 2023-09-13 PROCEDURE — 25010000002 HYDROMORPHONE PER 4 MG: Performed by: NURSE ANESTHETIST, CERTIFIED REGISTERED

## 2023-09-13 PROCEDURE — 88331 PATH CONSLTJ SURG 1 BLK 1SPC: CPT | Performed by: SURGERY

## 2023-09-13 PROCEDURE — 25010000002 MIDAZOLAM PER 1 MG: Performed by: ANESTHESIOLOGY

## 2023-09-13 DEVICE — ARISTA AH ABSORBABLE HEMOSTATIC PARTICLES
Type: IMPLANTABLE DEVICE | Site: ABDOMEN | Status: FUNCTIONAL
Brand: ARISTA™ AH

## 2023-09-13 DEVICE — HORIZON TI LG 6 CLIPS/CART
Type: IMPLANTABLE DEVICE | Site: ABDOMEN | Status: FUNCTIONAL
Brand: WECK

## 2023-09-13 DEVICE — ENDOPATH ECHELON ENDOSCOPIC LINEAR CUTTER RELOADS, WHITE, 60MM
Type: IMPLANTABLE DEVICE | Site: ABDOMEN | Status: FUNCTIONAL
Brand: ECHELON ENDOPATH

## 2023-09-13 RX ORDER — FLUMAZENIL 0.1 MG/ML
0.2 INJECTION INTRAVENOUS AS NEEDED
Status: DISCONTINUED | OUTPATIENT
Start: 2023-09-13 | End: 2023-09-13 | Stop reason: HOSPADM

## 2023-09-13 RX ORDER — DROPERIDOL 2.5 MG/ML
INJECTION, SOLUTION INTRAMUSCULAR; INTRAVENOUS AS NEEDED
Status: DISCONTINUED | OUTPATIENT
Start: 2023-09-13 | End: 2023-09-13 | Stop reason: SURG

## 2023-09-13 RX ORDER — DROPERIDOL 2.5 MG/ML
0.62 INJECTION, SOLUTION INTRAMUSCULAR; INTRAVENOUS
Status: DISCONTINUED | OUTPATIENT
Start: 2023-09-13 | End: 2023-09-13 | Stop reason: HOSPADM

## 2023-09-13 RX ORDER — EPHEDRINE SULFATE 50 MG/ML
5 INJECTION, SOLUTION INTRAVENOUS ONCE AS NEEDED
Status: DISCONTINUED | OUTPATIENT
Start: 2023-09-13 | End: 2023-09-13 | Stop reason: HOSPADM

## 2023-09-13 RX ORDER — SODIUM CHLORIDE 0.9 % (FLUSH) 0.9 %
3 SYRINGE (ML) INJECTION EVERY 12 HOURS SCHEDULED
Status: DISCONTINUED | OUTPATIENT
Start: 2023-09-13 | End: 2023-09-13 | Stop reason: HOSPADM

## 2023-09-13 RX ORDER — NALOXONE HCL 0.4 MG/ML
0.1 VIAL (ML) INJECTION
Status: DISCONTINUED | OUTPATIENT
Start: 2023-09-13 | End: 2023-09-16

## 2023-09-13 RX ORDER — LIDOCAINE HYDROCHLORIDE 20 MG/ML
INJECTION, SOLUTION INFILTRATION; PERINEURAL AS NEEDED
Status: DISCONTINUED | OUTPATIENT
Start: 2023-09-13 | End: 2023-09-13 | Stop reason: SURG

## 2023-09-13 RX ORDER — SODIUM CHLORIDE 9 MG/ML
40 INJECTION, SOLUTION INTRAVENOUS AS NEEDED
Status: DISCONTINUED | OUTPATIENT
Start: 2023-09-13 | End: 2023-09-13 | Stop reason: HOSPADM

## 2023-09-13 RX ORDER — PROMETHAZINE HYDROCHLORIDE 25 MG/1
25 TABLET ORAL ONCE AS NEEDED
Status: DISCONTINUED | OUTPATIENT
Start: 2023-09-13 | End: 2023-09-13 | Stop reason: HOSPADM

## 2023-09-13 RX ORDER — FENTANYL CITRATE 50 UG/ML
50 INJECTION, SOLUTION INTRAMUSCULAR; INTRAVENOUS
Status: DISCONTINUED | OUTPATIENT
Start: 2023-09-13 | End: 2023-09-13 | Stop reason: HOSPADM

## 2023-09-13 RX ORDER — KETOROLAC TROMETHAMINE 30 MG/ML
15 INJECTION, SOLUTION INTRAMUSCULAR; INTRAVENOUS EVERY 6 HOURS
Status: DISPENSED | OUTPATIENT
Start: 2023-09-13 | End: 2023-09-15

## 2023-09-13 RX ORDER — PROMETHAZINE HYDROCHLORIDE 25 MG/1
25 SUPPOSITORY RECTAL ONCE AS NEEDED
Status: DISCONTINUED | OUTPATIENT
Start: 2023-09-13 | End: 2023-09-13 | Stop reason: HOSPADM

## 2023-09-13 RX ORDER — SODIUM CHLORIDE, SODIUM LACTATE, POTASSIUM CHLORIDE, CALCIUM CHLORIDE 600; 310; 30; 20 MG/100ML; MG/100ML; MG/100ML; MG/100ML
9 INJECTION, SOLUTION INTRAVENOUS CONTINUOUS
Status: DISCONTINUED | OUTPATIENT
Start: 2023-09-13 | End: 2023-09-13

## 2023-09-13 RX ORDER — FENTANYL CITRATE 50 UG/ML
50 INJECTION, SOLUTION INTRAMUSCULAR; INTRAVENOUS ONCE AS NEEDED
Status: COMPLETED | OUTPATIENT
Start: 2023-09-13 | End: 2023-09-13

## 2023-09-13 RX ORDER — MIDAZOLAM HYDROCHLORIDE 1 MG/ML
0.5 INJECTION INTRAMUSCULAR; INTRAVENOUS
Status: DISCONTINUED | OUTPATIENT
Start: 2023-09-13 | End: 2023-09-13 | Stop reason: HOSPADM

## 2023-09-13 RX ORDER — LIDOCAINE HYDROCHLORIDE 40 MG/ML
SOLUTION TOPICAL AS NEEDED
Status: DISCONTINUED | OUTPATIENT
Start: 2023-09-13 | End: 2023-09-13 | Stop reason: SURG

## 2023-09-13 RX ORDER — NALOXONE HCL 0.4 MG/ML
0.2 VIAL (ML) INJECTION AS NEEDED
Status: DISCONTINUED | OUTPATIENT
Start: 2023-09-13 | End: 2023-09-13 | Stop reason: HOSPADM

## 2023-09-13 RX ORDER — ONDANSETRON 2 MG/ML
INJECTION INTRAMUSCULAR; INTRAVENOUS AS NEEDED
Status: DISCONTINUED | OUTPATIENT
Start: 2023-09-13 | End: 2023-09-13 | Stop reason: SURG

## 2023-09-13 RX ORDER — IPRATROPIUM BROMIDE AND ALBUTEROL SULFATE 2.5; .5 MG/3ML; MG/3ML
3 SOLUTION RESPIRATORY (INHALATION) ONCE AS NEEDED
Status: DISCONTINUED | OUTPATIENT
Start: 2023-09-13 | End: 2023-09-13 | Stop reason: HOSPADM

## 2023-09-13 RX ORDER — VALSARTAN 160 MG/1
160 TABLET ORAL
Status: DISCONTINUED | OUTPATIENT
Start: 2023-09-13 | End: 2023-09-17

## 2023-09-13 RX ORDER — SODIUM CHLORIDE 0.9 % (FLUSH) 0.9 %
3-10 SYRINGE (ML) INJECTION AS NEEDED
Status: DISCONTINUED | OUTPATIENT
Start: 2023-09-13 | End: 2023-09-13 | Stop reason: HOSPADM

## 2023-09-13 RX ORDER — MAGNESIUM SULFATE HEPTAHYDRATE 500 MG/ML
INJECTION, SOLUTION INTRAMUSCULAR; INTRAVENOUS AS NEEDED
Status: DISCONTINUED | OUTPATIENT
Start: 2023-09-13 | End: 2023-09-13 | Stop reason: SURG

## 2023-09-13 RX ORDER — SODIUM CHLORIDE 0.9 % (FLUSH) 0.9 %
10 SYRINGE (ML) INJECTION AS NEEDED
Status: DISCONTINUED | OUTPATIENT
Start: 2023-09-13 | End: 2023-09-13 | Stop reason: HOSPADM

## 2023-09-13 RX ORDER — EPHEDRINE SULFATE 50 MG/ML
INJECTION, SOLUTION INTRAVENOUS AS NEEDED
Status: DISCONTINUED | OUTPATIENT
Start: 2023-09-13 | End: 2023-09-13 | Stop reason: SURG

## 2023-09-13 RX ORDER — ROCURONIUM BROMIDE 10 MG/ML
INJECTION, SOLUTION INTRAVENOUS AS NEEDED
Status: DISCONTINUED | OUTPATIENT
Start: 2023-09-13 | End: 2023-09-13 | Stop reason: SURG

## 2023-09-13 RX ORDER — HYDRALAZINE HYDROCHLORIDE 20 MG/ML
5 INJECTION INTRAMUSCULAR; INTRAVENOUS
Status: DISCONTINUED | OUTPATIENT
Start: 2023-09-13 | End: 2023-09-13 | Stop reason: HOSPADM

## 2023-09-13 RX ORDER — SODIUM CHLORIDE, SODIUM LACTATE, POTASSIUM CHLORIDE, CALCIUM CHLORIDE 600; 310; 30; 20 MG/100ML; MG/100ML; MG/100ML; MG/100ML
INJECTION, SOLUTION INTRAVENOUS CONTINUOUS PRN
Status: DISCONTINUED | OUTPATIENT
Start: 2023-09-13 | End: 2023-09-13 | Stop reason: SURG

## 2023-09-13 RX ORDER — CEFAZOLIN SODIUM 2 G/100ML
2000 INJECTION, SOLUTION INTRAVENOUS EVERY 8 HOURS
Status: COMPLETED | OUTPATIENT
Start: 2023-09-13 | End: 2023-09-14

## 2023-09-13 RX ORDER — DIPHENHYDRAMINE HYDROCHLORIDE 50 MG/ML
12.5 INJECTION INTRAMUSCULAR; INTRAVENOUS
Status: DISCONTINUED | OUTPATIENT
Start: 2023-09-13 | End: 2023-09-13 | Stop reason: HOSPADM

## 2023-09-13 RX ORDER — KETAMINE HCL IN NACL, ISO-OSM 100MG/10ML
SYRINGE (ML) INJECTION AS NEEDED
Status: DISCONTINUED | OUTPATIENT
Start: 2023-09-13 | End: 2023-09-13 | Stop reason: SURG

## 2023-09-13 RX ORDER — HYDROMORPHONE HYDROCHLORIDE 1 MG/ML
0.5 INJECTION, SOLUTION INTRAMUSCULAR; INTRAVENOUS; SUBCUTANEOUS
Status: DISCONTINUED | OUTPATIENT
Start: 2023-09-13 | End: 2023-09-13 | Stop reason: HOSPADM

## 2023-09-13 RX ORDER — CEFAZOLIN SODIUM 2 G/100ML
2000 INJECTION, SOLUTION INTRAVENOUS ONCE
Status: COMPLETED | OUTPATIENT
Start: 2023-09-13 | End: 2023-09-13

## 2023-09-13 RX ORDER — SODIUM CHLORIDE 9 MG/ML
75 INJECTION, SOLUTION INTRAVENOUS CONTINUOUS
Status: DISCONTINUED | OUTPATIENT
Start: 2023-09-13 | End: 2023-09-17

## 2023-09-13 RX ORDER — PROPOFOL 10 MG/ML
VIAL (ML) INTRAVENOUS AS NEEDED
Status: DISCONTINUED | OUTPATIENT
Start: 2023-09-13 | End: 2023-09-13 | Stop reason: SURG

## 2023-09-13 RX ORDER — FAMOTIDINE 10 MG/ML
20 INJECTION, SOLUTION INTRAVENOUS ONCE
Status: COMPLETED | OUTPATIENT
Start: 2023-09-13 | End: 2023-09-13

## 2023-09-13 RX ORDER — METOPROLOL TARTRATE 5 MG/5ML
INJECTION INTRAVENOUS AS NEEDED
Status: DISCONTINUED | OUTPATIENT
Start: 2023-09-13 | End: 2023-09-13 | Stop reason: SURG

## 2023-09-13 RX ORDER — MAGNESIUM HYDROXIDE 1200 MG/15ML
LIQUID ORAL AS NEEDED
Status: DISCONTINUED | OUTPATIENT
Start: 2023-09-13 | End: 2023-09-13 | Stop reason: HOSPADM

## 2023-09-13 RX ORDER — ENOXAPARIN SODIUM 100 MG/ML
40 INJECTION SUBCUTANEOUS DAILY
Status: DISCONTINUED | OUTPATIENT
Start: 2023-09-14 | End: 2023-09-18 | Stop reason: HOSPADM

## 2023-09-13 RX ORDER — HYDROMORPHONE HYDROCHLORIDE 1 MG/ML
0.5 INJECTION, SOLUTION INTRAMUSCULAR; INTRAVENOUS; SUBCUTANEOUS
Status: DISCONTINUED | OUTPATIENT
Start: 2023-09-13 | End: 2023-09-16

## 2023-09-13 RX ORDER — LIDOCAINE HYDROCHLORIDE 10 MG/ML
0.5 INJECTION, SOLUTION INFILTRATION; PERINEURAL ONCE AS NEEDED
Status: DISCONTINUED | OUTPATIENT
Start: 2023-09-13 | End: 2023-09-13 | Stop reason: HOSPADM

## 2023-09-13 RX ORDER — ONDANSETRON 2 MG/ML
4 INJECTION INTRAMUSCULAR; INTRAVENOUS ONCE AS NEEDED
Status: DISCONTINUED | OUTPATIENT
Start: 2023-09-13 | End: 2023-09-13 | Stop reason: HOSPADM

## 2023-09-13 RX ORDER — FENTANYL CITRATE 50 UG/ML
INJECTION, SOLUTION INTRAMUSCULAR; INTRAVENOUS AS NEEDED
Status: DISCONTINUED | OUTPATIENT
Start: 2023-09-13 | End: 2023-09-13 | Stop reason: SURG

## 2023-09-13 RX ORDER — SODIUM CHLORIDE 0.9 % (FLUSH) 0.9 %
10 SYRINGE (ML) INJECTION EVERY 12 HOURS SCHEDULED
Status: DISCONTINUED | OUTPATIENT
Start: 2023-09-13 | End: 2023-09-13 | Stop reason: HOSPADM

## 2023-09-13 RX ORDER — LABETALOL HYDROCHLORIDE 5 MG/ML
5 INJECTION, SOLUTION INTRAVENOUS
Status: COMPLETED | OUTPATIENT
Start: 2023-09-13 | End: 2023-09-13

## 2023-09-13 RX ORDER — ONDANSETRON 2 MG/ML
4 INJECTION INTRAMUSCULAR; INTRAVENOUS EVERY 6 HOURS PRN
Status: DISCONTINUED | OUTPATIENT
Start: 2023-09-13 | End: 2023-09-18 | Stop reason: HOSPADM

## 2023-09-13 RX ADMIN — SUGAMMADEX 400 MG: 100 INJECTION, SOLUTION INTRAVENOUS at 10:33

## 2023-09-13 RX ADMIN — LIDOCAINE HYDROCHLORIDE 100 MG: 20 INJECTION, SOLUTION INFILTRATION; PERINEURAL at 07:48

## 2023-09-13 RX ADMIN — Medication 20 MG: at 08:12

## 2023-09-13 RX ADMIN — DROPERIDOL 0.62 MG: 2.5 INJECTION, SOLUTION INTRAMUSCULAR; INTRAVENOUS at 10:00

## 2023-09-13 RX ADMIN — HYDROMORPHONE HYDROCHLORIDE 1 MG: 1 INJECTION, SOLUTION INTRAMUSCULAR; INTRAVENOUS; SUBCUTANEOUS at 16:57

## 2023-09-13 RX ADMIN — LABETALOL HYDROCHLORIDE 5 MG: 5 INJECTION, SOLUTION INTRAVENOUS at 11:07

## 2023-09-13 RX ADMIN — METOPROLOL TARTRATE 2 MG: 1 INJECTION, SOLUTION INTRAVENOUS at 08:19

## 2023-09-13 RX ADMIN — MIDAZOLAM 1 MG: 1 INJECTION INTRAMUSCULAR; INTRAVENOUS at 07:07

## 2023-09-13 RX ADMIN — LABETALOL HYDROCHLORIDE 5 MG: 5 INJECTION, SOLUTION INTRAVENOUS at 11:02

## 2023-09-13 RX ADMIN — FAMOTIDINE 20 MG: 10 INJECTION INTRAVENOUS at 07:07

## 2023-09-13 RX ADMIN — ROCURONIUM BROMIDE 50 MG: 10 INJECTION INTRAVENOUS at 07:48

## 2023-09-13 RX ADMIN — ROCURONIUM BROMIDE 50 MG: 10 INJECTION INTRAVENOUS at 08:45

## 2023-09-13 RX ADMIN — SODIUM CHLORIDE 100 ML/HR: 9 INJECTION, SOLUTION INTRAVENOUS at 14:30

## 2023-09-13 RX ADMIN — VALSARTAN 160 MG: 160 TABLET, FILM COATED ORAL at 17:01

## 2023-09-13 RX ADMIN — FENTANYL CITRATE 50 MCG: 50 INJECTION, SOLUTION INTRAMUSCULAR; INTRAVENOUS at 07:07

## 2023-09-13 RX ADMIN — FENTANYL CITRATE 50 MCG: 50 INJECTION, SOLUTION INTRAMUSCULAR; INTRAVENOUS at 07:48

## 2023-09-13 RX ADMIN — Medication 10 MG: at 10:07

## 2023-09-13 RX ADMIN — LABETALOL HYDROCHLORIDE 5 MG: 5 INJECTION, SOLUTION INTRAVENOUS at 11:17

## 2023-09-13 RX ADMIN — PROPOFOL 200 MG: 10 INJECTION, EMULSION INTRAVENOUS at 07:48

## 2023-09-13 RX ADMIN — CEFAZOLIN SODIUM 2000 MG: 2 INJECTION, SOLUTION INTRAVENOUS at 10:19

## 2023-09-13 RX ADMIN — PROPOFOL 50 MG: 10 INJECTION, EMULSION INTRAVENOUS at 10:27

## 2023-09-13 RX ADMIN — METOPROLOL TARTRATE 25 MG: 25 TABLET, FILM COATED ORAL at 17:01

## 2023-09-13 RX ADMIN — HYDROMORPHONE HYDROCHLORIDE 0.5 MG: 1 INJECTION, SOLUTION INTRAMUSCULAR; INTRAVENOUS; SUBCUTANEOUS at 11:09

## 2023-09-13 RX ADMIN — SODIUM CHLORIDE, POTASSIUM CHLORIDE, SODIUM LACTATE AND CALCIUM CHLORIDE 9 ML/HR: 600; 310; 30; 20 INJECTION, SOLUTION INTRAVENOUS at 07:33

## 2023-09-13 RX ADMIN — PROPOFOL 50 MG: 10 INJECTION, EMULSION INTRAVENOUS at 10:10

## 2023-09-13 RX ADMIN — MAGNESIUM SULFATE HEPTAHYDRATE 2 G: 500 INJECTION, SOLUTION INTRAMUSCULAR; INTRAVENOUS at 08:14

## 2023-09-13 RX ADMIN — HYDRALAZINE HYDROCHLORIDE 5 MG: 20 INJECTION, SOLUTION INTRAMUSCULAR; INTRAVENOUS at 11:10

## 2023-09-13 RX ADMIN — SODIUM CHLORIDE, POTASSIUM CHLORIDE, SODIUM LACTATE AND CALCIUM CHLORIDE: 600; 310; 30; 20 INJECTION, SOLUTION INTRAVENOUS at 07:35

## 2023-09-13 RX ADMIN — PROPOFOL 50 MG: 10 INJECTION, EMULSION INTRAVENOUS at 10:34

## 2023-09-13 RX ADMIN — Medication 10 MG: at 09:10

## 2023-09-13 RX ADMIN — FENTANYL CITRATE 50 MCG: 50 INJECTION, SOLUTION INTRAMUSCULAR; INTRAVENOUS at 11:05

## 2023-09-13 RX ADMIN — FENTANYL CITRATE 50 MCG: 50 INJECTION, SOLUTION INTRAMUSCULAR; INTRAVENOUS at 08:19

## 2023-09-13 RX ADMIN — METOPROLOL TARTRATE 2 MG: 1 INJECTION, SOLUTION INTRAVENOUS at 10:46

## 2023-09-13 RX ADMIN — LIDOCAINE HYDROCHLORIDE 1 EACH: 40 SOLUTION TOPICAL at 07:50

## 2023-09-13 RX ADMIN — HYDROMORPHONE HYDROCHLORIDE 1 MG: 1 INJECTION, SOLUTION INTRAMUSCULAR; INTRAVENOUS; SUBCUTANEOUS at 14:29

## 2023-09-13 RX ADMIN — Medication 30 MG: at 08:07

## 2023-09-13 RX ADMIN — LABETALOL HYDROCHLORIDE 5 MG: 5 INJECTION, SOLUTION INTRAVENOUS at 11:12

## 2023-09-13 RX ADMIN — HYDROMORPHONE HYDROCHLORIDE 0.5 MG: 1 INJECTION, SOLUTION INTRAMUSCULAR; INTRAVENOUS; SUBCUTANEOUS at 10:39

## 2023-09-13 RX ADMIN — EPHEDRINE SULFATE 10 MG: 50 INJECTION INTRAVENOUS at 08:28

## 2023-09-13 RX ADMIN — HYDROCORTISONE SODIUM SUCCINATE 50 MG: 100 INJECTION, POWDER, FOR SOLUTION INTRAMUSCULAR; INTRAVENOUS at 17:01

## 2023-09-13 RX ADMIN — KETOROLAC TROMETHAMINE 15 MG: 30 INJECTION, SOLUTION INTRAMUSCULAR; INTRAVENOUS at 17:02

## 2023-09-13 RX ADMIN — METOPROLOL TARTRATE 1 MG: 1 INJECTION, SOLUTION INTRAVENOUS at 10:56

## 2023-09-13 RX ADMIN — CEFAZOLIN SODIUM 2000 MG: 2 INJECTION, SOLUTION INTRAVENOUS at 17:01

## 2023-09-13 RX ADMIN — HYDROMORPHONE HYDROCHLORIDE 0.5 MG: 1 INJECTION, SOLUTION INTRAMUSCULAR; INTRAVENOUS; SUBCUTANEOUS at 11:50

## 2023-09-13 RX ADMIN — HYDRALAZINE HYDROCHLORIDE 5 MG: 20 INJECTION, SOLUTION INTRAMUSCULAR; INTRAVENOUS at 11:20

## 2023-09-13 RX ADMIN — ONDANSETRON 4 MG: 2 INJECTION INTRAMUSCULAR; INTRAVENOUS at 10:22

## 2023-09-13 RX ADMIN — CEFAZOLIN SODIUM 2000 MG: 2 INJECTION, SOLUTION INTRAVENOUS at 07:33

## 2023-09-13 RX ADMIN — ROCURONIUM BROMIDE 10 MG: 10 INJECTION INTRAVENOUS at 09:58

## 2023-09-13 NOTE — OP NOTE
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    ANESTHESIA:  General    EBL:  Minimal    SPECIMEN(S):  Gastrectomy    DESCRIPTION:  In supine position under general anesthetic prepped and draped usual sterile manner.  Upper midline laparotomy incision made and large wound protector inserted.  Abdomen explored with no evidence of peritoneal implants.  Omentum  from the transverse colon along its entire length and loose attachments of the posterior gastric wall to the pancreas were  with electrocautery.  The right gastroepiploic's were identified, clamped proximally and then divided with the Enseal device.  Tied with 2-0 silk tie.  The right gastric was likewise identified, clamped proximally, divided with the Enseal device, and then tied off with 2-0 silk tie.  The duodenum was transected immediately distal to the pylorus with the Fajardo white loaded stapler.  Dissection was then continued proximally.  The short gastrics were divided close to the spleen using the Enseal device and dissection was carried to the left liana.  The gastrohepatic ligament was divided with the Enseal device and the left gastric was then identified close to the takeoff from celiac where it was clamped, divided with the Enseal device, and then ligated with 0 silk tie.  The phrenoesophageal ligament was then divided and with additional mediastinal dissection at least 5 cm of intra-abdominal esophageal length was achieved without tension.  A point of transection on the  esophagus at least 2 cm proximal to the GE junction was chosen.  2-0 silk fixation sutures were placed and the esophagus was divided with electrocautery.  Specimen was sent for frozen section.  While awaiting frozen section results, a small window was made in the transverse mesocolon.  The jejunum was divided distal to the ligament of Treitz with the [place] white loaded stapler and the staple line was oversewn with 2-0 silk.  The distal end was brought through the transverse mesocolon window and placed adjacent to the esophagus and there was no difficulty with reaching that point.  The transverse mesocolon mesentery was closed over that portion of the jejunum with interrupted 2-0 silk to prevent internal herniation.  The Steffany limb was measured out to 50 cm and the end of the proximal jejunum was anastomosed to the side of the Steffany limb again 50 cm distal to the planned esophagojejunostomy and a 2 layer handsewn anastomosis of outer interrupted 2-0 silk and running interlocking 3-0 chromic.  At this point the frozen section returned confirming negative margins.  The side of the jejunum was then anastomosed to the end of the esophagus and a single layer handsewn anastomosis posteriorly of interrupted 3-0 PDS sutures and anteriorly of running 3-0 PDS suture.  Good hemostasis was noted.  Nasogastric tube was passed and passed through the anastomosis without difficulty and secured.  19 [place] Vern drain was placed near the esophagojejunostomy.  A 14 [place] red rubber catheter was then placed in the left mid abdomen.  This was witzeled into the jejunum distal to the jejunojejunostomy with interrupted 3-0 Vicryl suture and secured to the abdominal wall.  Abdomen was irrigated and again good hemostasis was noted.  Half percent Marcaine with epinephrine mixed with Exparel was infiltrated into the preperitoneal and subcutaneous planes.  Fascia was closed with running 0 PDS.  Skin was closed with staples.  All sponge and  needle counts were correct.  Tolerated well, stable to recovery room.    Tito Mckenzie M.D.

## 2023-09-13 NOTE — ANESTHESIA PROCEDURE NOTES
Arterial Line      Patient location during procedure: holding area   Line placed for hemodynamic monitoring.  Performed By   Anesthesiologist: Shalom Villafuerte MD   Preanesthetic Checklist  Completed: patient identified, IV checked, site marked, risks and benefits discussed, surgical consent, monitors and equipment checked, pre-op evaluation and timeout performed  Arterial Line Prep    Sterile Tech: gloves  Prep: ChloraPrep  Patient monitoring: blood pressure monitoring, continuous pulse oximetry and EKG  Arterial Line Procedure   Laterality:right  Location:  radial artery  Catheter size: 20 G   Guidance: palpation technique  Number of attempts: 1  Successful placement: yes   Post Assessment   Dressing Type: occlusive dressing applied, secured with tape and wrist guard applied.   Complications no  Circ/Move/Sens Assessment: normal and unchanged.   Patient Tolerance: patient tolerated the procedure well with no apparent complications  Additional Notes  :

## 2023-09-13 NOTE — PLAN OF CARE
Goal Outcome Evaluation:  Plan of Care Reviewed With: patient        Progress: no change  Outcome Evaluation: VSS. Pt transferred to [facility] from surgery. Pt post op from EGD, gastrectomy, and J tube placement. Pt has gan in place. J Tube clamped, NG to low wall suction, and cyndi drain to bulb suction. SCDs in place. Pt NPO. Room air. NS at 100ml/hr. 1mg of dilaudid given x2. No needs at this time

## 2023-09-13 NOTE — ANESTHESIA PROCEDURE NOTES
Airway  Urgency: elective    Date/Time: 9/13/2023 7:50 AM  Airway not difficult    General Information and Staff    Patient location during procedure: OR  CRNA/CAA: Lyly Hilliard CRNA    Indications and Patient Condition  Indications for airway management: airway protection    Preoxygenated: yes  MILS not maintained throughout  Mask difficulty assessment: 1 - vent by mask    Final Airway Details  Final airway type: endotracheal airway      Successful airway: ETT and EBT - double lumen left  Cuffed: yes   Successful intubation technique: direct laryngoscopy  Facilitating devices/methods: cricoid pressure  Endotracheal tube insertion site: oral  Blade: Fatuma  Blade size: 4  EBT DL size (fr): 39  Cormack-Lehane Classification: grade I - full view of glottis  Placement verified by: chest auscultation, bronchoscopy and capnometry   Measured from: lips  ETT/EBT  to lips (cm): 29  Number of attempts at approach: 1  Assessment: lips, teeth, and gum same as pre-op and atraumatic intubation    Additional Comments  Dentition intact and unchanged. CBEBS.  +ETCO2.

## 2023-09-13 NOTE — ANESTHESIA PREPROCEDURE EVALUATION
Anesthesia Evaluation     Patient summary reviewed and Nursing notes reviewed   NPO Solid Status: > 8 hours  NPO Liquid Status: > 2 hours           Airway   Mallampati: I  TM distance: >3 FB  Neck ROM: full  Large neck circumference  Dental - normal exam     Pulmonary - normal exam   Cardiovascular - normal exam  Exercise tolerance: good (4-7 METS)    ECG reviewed    (+) hypertension well controlled, hyperlipidemia      Neuro/Psych  (+) numbness (RLS)  GI/Hepatic/Renal/Endo    (+) obesity, GERD well controlled, diabetes mellitus type 2 well controlled    Musculoskeletal     Abdominal  - normal exam   Substance History      OB/GYN          Other      history of cancer active    ROS/Med Hx Other: Esophageal CA                Anesthesia Plan    ASA 3     general and Deonna     intravenous induction     Anesthetic plan, risks, benefits, and alternatives have been provided, discussed and informed consent has been obtained with: patient.    Use of blood products discussed with patient .    CODE STATUS:

## 2023-09-13 NOTE — ANESTHESIA POSTPROCEDURE EVALUATION
"Patient: Mark Ken    Procedure Summary       Date: 09/13/23 Room / Location: [facility] OR  /  [facility]    Anesthesia Start: 0738 Anesthesia Stop: 1101    Procedures:       Esophagogastroduodenoscopy (Esophagus)      GASTRECTOMY WITH FEEDING JEJUNOSTOMY PLACEMENT (Abdomen) Diagnosis:       Malignant neoplasm of cardia of stomach      (Malignant neoplasm of cardia of stomach [C16.0])    Surgeons: Berry Varela MD; Tito Mckenzie MD Provider: Shalom Villafuerte MD    Anesthesia Type: general, Deonna ASA Status: 3            Anesthesia Type: general, Deonna    Vitals  Vitals Value Taken Time   /82 09/13/23 1300   Temp 37.1 °C (98.7 °F) 09/13/23 1057   Pulse 89 09/13/23 1313   Resp 16 09/13/23 1300   SpO2 93 % 09/13/23 1313   Vitals shown include unvalidated device data.        Post Anesthesia Care and Evaluation    Patient location during evaluation: bedside  Patient participation: complete - patient participated  Level of consciousness: awake and alert  Pain management: adequate    Airway patency: patent  Anesthetic complications: No anesthetic complications    Cardiovascular status: acceptable  Respiratory status: acceptable  Hydration status: acceptable    Comments: /82   Pulse 86   Temp 37.1 °C (98.7 °F) (Oral)   Resp 16   Ht 172.7 cm (68\")   Wt 93 kg (205 lb 0.4 oz)   SpO2 95%   BMI 31.17 kg/m²     "

## 2023-09-14 LAB
ANION GAP SERPL CALCULATED.3IONS-SCNC: 12.8 MMOL/L (ref 5–15)
BUN SERPL-MCNC: 11 MG/DL (ref 8–23)
BUN/CREAT SERPL: 11.1 (ref 7–25)
CALCIUM SPEC-SCNC: 9.1 MG/DL (ref 8.6–10.5)
CHLORIDE SERPL-SCNC: 106 MMOL/L (ref 98–107)
CO2 SERPL-SCNC: 21.2 MMOL/L (ref 22–29)
CREAT SERPL-MCNC: 0.99 MG/DL (ref 0.76–1.27)
DEPRECATED RDW RBC AUTO: 62.9 FL (ref 37–54)
EGFRCR SERPLBLD CKD-EPI 2021: 80.9 ML/MIN/1.73
ERYTHROCYTE [DISTWIDTH] IN BLOOD BY AUTOMATED COUNT: 19.1 % (ref 12.3–15.4)
GLUCOSE SERPL-MCNC: 123 MG/DL (ref 65–99)
HCT VFR BLD AUTO: 41.2 % (ref 37.5–51)
HGB BLD-MCNC: 13.7 G/DL (ref 13–17.7)
LAB AP CASE REPORT: NORMAL
LAB AP INTRADEPARTMENTAL CONSULT: NORMAL
LAB AP SYNOPTIC CHECKLIST: NORMAL
Lab: NORMAL
MCH RBC QN AUTO: 30.3 PG (ref 26.6–33)
MCHC RBC AUTO-ENTMCNC: 33.3 G/DL (ref 31.5–35.7)
MCV RBC AUTO: 91.2 FL (ref 79–97)
PATH REPORT.FINAL DX SPEC: NORMAL
PATH REPORT.GROSS SPEC: NORMAL
PLATELET # BLD AUTO: 229 10*3/MM3 (ref 140–450)
PMV BLD AUTO: 9.5 FL (ref 6–12)
POTASSIUM SERPL-SCNC: 3.7 MMOL/L (ref 3.5–5.2)
RBC # BLD AUTO: 4.52 10*6/MM3 (ref 4.14–5.8)
SODIUM SERPL-SCNC: 140 MMOL/L (ref 136–145)
WBC NRBC COR # BLD: 18.81 10*3/MM3 (ref 3.4–10.8)

## 2023-09-14 PROCEDURE — 25010000002 CEFAZOLIN IN DEXTROSE 2-4 GM/100ML-% SOLUTION: Performed by: SURGERY

## 2023-09-14 PROCEDURE — 63710000001 PREDNISONE PER 5 MG: Performed by: SURGERY

## 2023-09-14 PROCEDURE — 85027 COMPLETE CBC AUTOMATED: CPT | Performed by: SURGERY

## 2023-09-14 PROCEDURE — 80048 BASIC METABOLIC PNL TOTAL CA: CPT | Performed by: SURGERY

## 2023-09-14 PROCEDURE — 25010000002 HYDROCORTISONE SOD SUC (PF) 100 MG RECONSTITUTED SOLUTION: Performed by: SURGERY

## 2023-09-14 PROCEDURE — 25010000002 ENOXAPARIN PER 10 MG: Performed by: SURGERY

## 2023-09-14 PROCEDURE — 25010000002 KETOROLAC TROMETHAMINE PER 15 MG: Performed by: SURGERY

## 2023-09-14 RX ORDER — PREDNISONE 5 MG/1
5 TABLET ORAL DAILY
Status: DISCONTINUED | OUTPATIENT
Start: 2023-09-14 | End: 2023-09-17

## 2023-09-14 RX ADMIN — VALSARTAN 160 MG: 160 TABLET, FILM COATED ORAL at 11:06

## 2023-09-14 RX ADMIN — SODIUM CHLORIDE 100 ML/HR: 9 INJECTION, SOLUTION INTRAVENOUS at 01:07

## 2023-09-14 RX ADMIN — SODIUM CHLORIDE 100 ML/HR: 9 INJECTION, SOLUTION INTRAVENOUS at 21:31

## 2023-09-14 RX ADMIN — HYDROCORTISONE SODIUM SUCCINATE 50 MG: 100 INJECTION, POWDER, FOR SOLUTION INTRAMUSCULAR; INTRAVENOUS at 01:02

## 2023-09-14 RX ADMIN — KETOROLAC TROMETHAMINE 15 MG: 30 INJECTION, SOLUTION INTRAMUSCULAR; INTRAVENOUS at 11:06

## 2023-09-14 RX ADMIN — METOPROLOL TARTRATE 25 MG: 25 TABLET, FILM COATED ORAL at 21:19

## 2023-09-14 RX ADMIN — CEFAZOLIN SODIUM 2000 MG: 2 INJECTION, SOLUTION INTRAVENOUS at 01:02

## 2023-09-14 RX ADMIN — ENOXAPARIN SODIUM 40 MG: 100 INJECTION SUBCUTANEOUS at 11:07

## 2023-09-14 RX ADMIN — KETOROLAC TROMETHAMINE 15 MG: 30 INJECTION, SOLUTION INTRAMUSCULAR; INTRAVENOUS at 07:04

## 2023-09-14 RX ADMIN — SODIUM CHLORIDE 100 ML/HR: 9 INJECTION, SOLUTION INTRAVENOUS at 11:07

## 2023-09-14 RX ADMIN — PREDNISONE 5 MG: 5 TABLET ORAL at 11:06

## 2023-09-14 RX ADMIN — KETOROLAC TROMETHAMINE 15 MG: 30 INJECTION, SOLUTION INTRAMUSCULAR; INTRAVENOUS at 17:10

## 2023-09-14 RX ADMIN — METOPROLOL TARTRATE 25 MG: 25 TABLET, FILM COATED ORAL at 11:06

## 2023-09-14 RX ADMIN — KETOROLAC TROMETHAMINE 15 MG: 30 INJECTION, SOLUTION INTRAMUSCULAR; INTRAVENOUS at 01:02

## 2023-09-14 NOTE — PROGRESS NOTES
Nutrition Services    Patient Name:  Mark Ken  YOB: 1950  MRN: [id number]  Admit Date:  9/13/2023  Assessment Date:  09/14/23    Summary: Cancer of stomach s/p total gastrectomy and placement of J-tube (9/13)    Pt continues to be NPO per MD. RD spoke with pt at bedside and answered nutrition-related questions. Provided pt with handout from [employer] for post-gastrectomy nutrition therapy. Spoke with MD regarding recs for EN via J-tube. Plan to hold on TF's until later this week per MD. Will leave TF recs below.     Recommendations:   PO diet as medically appropriate per MD.   Once medically appropriate may initiate TF's with Peptamen 1.5 @ 15 mL/hr. Would advance as tolerated/per MD to goal of 65 mL/hr.   Provides 2340 kcal, 106 gm protein and 1198 mL free water + 210 mL water flushes.   Suggest 35 mL FW q 4 hr while pt receiving IVF's.     RD will continue to follow course.     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)     Medical/Surgical History Past Medical History:   Diagnosis Date    Arthritis     BPH (benign prostatic hyperplasia)     BPH (benign prostatic hyperplasia)     Depression     Diabetes mellitus     Diarrhea     GERD (gastroesophageal reflux disease)     Gout     Hyperlipidemia     Hypertension     Neuropathy     bilat feet    Pulmonary arterial hypertension     Restless legs     Skin cancer, basal cell     Stomach cancer 2023    CHEMOTHERAPY       Past Surgical History:   Procedure Laterality Date    CARPAL TUNNEL RELEASE Left 01/19/2023    Procedure: LEFT CARPAL TUNNEL RELEASE AND SYNOVIAL BIOPSY;  Surgeon: Mikel Dupont MD;  Location: [facility] [place] MAIN OR;  Service: Hand;  Laterality: Left;    CARPAL TUNNEL RELEASE Right 03/02/2023    Procedure: RIGHT CARPAL TUNNEL RELEASE;   "Surgeon: Mikel Dupont MD;  [place] [facility] [place];  Service: Hand;  Laterality: Right;    CATARACT EXTRACTION WITH INTRAOCULAR LENS IMPLANT Bilateral     COLONOSCOPY N/A 03/09/2023    DIAGNOSTIC LAPAROSCOPY N/A 05/30/2023    Procedure: DIAGNOSTIC LAPAROSCOPY with peritoneal washing;  Surgeon: Tito Mckenzie MD;  Location: [facility] OR;  Service: General;  Laterality: N/A;    ENDOSCOPY N/A 04/27/2023    Dr. Reilly    ENDOSCOPY N/A 05/30/2023    Procedure: ESOPHAGOGASTRODUODENOSCOPY;  Surgeon: Tito Mckenzie MD;  Location: [facility] OR;  Service: General;  Laterality: N/A;    ESOPHAGOGASTRECTOMY N/A 9/13/2023    Procedure: Esophagogastroduodenoscopy;  Surgeon: Berry Varela MD;  Location: [facility] MAIN OR;  Service: Thoracic;  Laterality: N/A;    GASTRECTOMY N/A 9/13/2023    Procedure: GASTRECTOMY WITH FEEDING JEJUNOSTOMY PLACEMENT;  Surgeon: Tito Mckenzie MD;  Location: [facility] OR;  Service: General;  Laterality: N/A;    KNEE ARTHROSCOPY Bilateral     TOTAL KNEE ARTHROPLASTY Right 02/21/2018    UPPER ENDOSCOPIC ULTRASOUND W/ FNA N/A 06/21/2023    Procedure: ENDOSCOPIC ULTRASOUND WITH STAGING AND FINE NEEDLE ASPIRATION;  Surgeon: Kavon Hodge MD;  Location: [facility] ENDOSCOPY;  Service: Gastroenterology;  Laterality: N/A;  Post:    VENOUS ACCESS DEVICE (PORT) INSERTION N/A 06/23/2023    Procedure: INSERTION VENOUS ACCESS DEVICE;  Surgeon: Berry Varela MD;  Location: [facility] OR;  Service: Thoracic;  Laterality: N/A;        Encounter Information        Nutrition History:     Factors Affecting Intake: altered GI function     Anthropometrics        Current Height  Current Weight  BMI kg/m2 Height: 172.7 cm (68\")  Weight: 93 kg (205 lb 0.4 oz) (09/13/23 0710)  Body mass index is 31.17 kg/m².   Adjusted BMI (if applicable)    BMI Category Obese, Class I (30 - 34.9)       Admission Weight 205 lb (93 kg)       Ideal Body Weight (IBW) 154 lb (70 kg)       Usual Body Weight (UBW) 205-210 lb  "   Weight Trend Stable       Weight History Wt Readings from Last 30 Encounters:   09/13/23 0710 93 kg (205 lb 0.4 oz)   09/06/23 0731 93.5 kg (206 lb 1.6 oz)   08/23/23 1659 90.4 kg (199 lb 6.4 oz)   08/17/23 0823 88.5 kg (195 lb)   08/08/23 0831 91.4 kg (201 lb 9.6 oz)   08/03/23 0847 90.7 kg (200 lb)   07/25/23 0800 91.5 kg (201 lb 12.8 oz)   07/11/23 0853 92.4 kg (203 lb 12.8 oz)   07/05/23 1104 92.5 kg (203 lb 14.4 oz)   06/29/23 0906 95 kg (209 lb 6.4 oz)   06/27/23 0745 94 kg (207 lb 3.2 oz)   06/21/23 1213 91.6 kg (202 lb)   06/15/23 1240 93.4 kg (206 lb)   06/20/23 1130 91.7 kg (202 lb 1.6 oz)   06/16/23 1409 93 kg (205 lb 1.6 oz)   06/13/23 1327 93.5 kg (206 lb 1.6 oz)   06/05/23 1319 92.5 kg (204 lb)   05/26/23 1430 95.2 kg (209 lb 12.8 oz)   03/02/23 0635 94.3 kg (208 lb)   02/27/23 1416 95.3 kg (210 lb)   01/19/23 0817 94.3 kg (207 lb 12.8 oz)   01/16/23 1433 95.3 kg (210 lb)   12/06/22 1033 104 kg (229 lb)   12/06/22 0839 104 kg (229 lb 8 oz)   12/06/22 0800 104 kg (229 lb 8 oz)   04/26/22 1521 93 kg (205 lb)        Estimated/Assessed Needs        Current Weight  Weight: 93 kg (205 lb 0.4 oz) (09/13/23 0710)       Energy Requirements    Weight for Calculation 93 kg   Method for Estimation  25 kcal/kg, 30 kcal/kg   EST Needs (kcal/day) 2571-9259 kcal       Protein Requirements    Weight for Calculation 93 kg   EST Protein Needs (g/kg) 1.2 gm/kg   EST Daily Needs (g/day) 111 gm       Fluid Requirements     Method for Estimation 1 mL/kcal    EST Needs (mL/day)      Tests/Procedures        Tests/Procedures Other: s/p total gastrectomy and J-tube placement (9/13)     Labs       Pertinent Labs    Results from last 7 days   Lab Units 09/14/23  0624   SODIUM mmol/L 140   POTASSIUM mmol/L 3.7   CHLORIDE mmol/L 106   CO2 mmol/L 21.2*   BUN mg/dL 11   CREATININE mg/dL 0.99   CALCIUM mg/dL 9.1   GLUCOSE mg/dL 123*     Results from last 7 days   Lab Units 09/14/23  0624   HEMOGLOBIN g/dL 13.7   HEMATOCRIT % 41.2    WBC 10*3/mm3 18.81*     Results from last 7 days   Lab Units 09/14/23  0624   PLATELETS 10*3/mm3 229     No results found for: COVID19  Lab Results   Component Value Date    HGBA1C 6.50 (H) 06/13/2023          Medications           Scheduled Medications enoxaparin, 40 mg, Subcutaneous, Daily  ketorolac, 15 mg, Intravenous, Q6H  metoprolol tartrate, 25 mg, Per J Tube, Q12H  predniSONE, 5 mg, Per J Tube, Daily  valsartan, 160 mg, Per J Tube, Q24H       Infusions sodium chloride, 100 mL/hr, Last Rate: 100 mL/hr (09/14/23 0107)       PRN Medications   HYDROmorphone **AND** naloxone    HYDROmorphone    ondansetron     Physical Findings          General Appearance alert, oriented, overweight, room air   Oral/Mouth Cavity WNL   Edema  no edema   Gastrointestinal hypoactive bowel sounds   Skin  surgical incision: midline abdomen   Tubes/Drains/Lines drain, RUQ, implantable port, jejunostomy    NFPE Not indicated at this time   --  Current Nutrition Orders & Evaluation of Intake       Oral Nutrition     Food Allergies NKFA   Current PO Diet NPO Diet NPO Type: Strict NPO   Supplement n/a   PO Evaluation     % PO Intake/# of Days NPO   --  PES STATEMENT / NUTRITION DIAGNOSIS      Nutrition Dx Problem  Problem: Altered GI Function  Etiology: Medical Diagnosis - malignant neoplasm of cardia of stomach s/p total gastrectomy and J-tube placement    Signs/Symptoms: Report/Observation     --  NUTRITION INTERVENTION / PLAN OF CARE      Intervention Goal(s) Improved nutrition related labs, Reduce/improve symptoms, Meet estimated needs, Disease management/therapy, Maintain weight, and No significant weight loss         RD Intervention/Action Await initiation of PO diet, Await initiation of EN/PN, Recommend/order: TF's, and Education regarding: diet recs following total gastrectomy         Prescription/Orders:       PO Diet       Supplements       Snacks       Enteral Nutrition       Parenteral Nutrition    New Prescription Ordered?  No, recommended      Enteral Prescription:     Enteral Route Jejunostomy    TF Delivery Method Continuous    Enteral Product Peptamen 1.5    Modular None    Propofol Rate/Kcal     TF Start Rate (mL/hr) 15 mL/hr    TF Goal Rate (mL/hr) 65 mL/hr    Free Water Flush (mL) 35 mL FW q 4 hr while pt receiving IVF's. Once IVF's d/c would change to 45 mL FW q hr     TF Provision at Goal: 2340 kcal, 106 gm protein, 1198 mL free water + 210 mL water flushes         Calories 100 % needs met         Protein  95 % needs met         Fluid (mL) 1408 mL total     Prescription Ordered No, recommended         Monitor/Evaluation Per protocol, I&O, Pertinent labs, Weight, Skin status, GI status, Symptoms   Discharge Plan/Needs Pending clinical course           Education topic: Gastrectomy     Resources given:  Printed materials, Sample menus     Advised regarding: Appropriate portions, Beverage choices, Eating pattern, Food choices, Food preparation, Food shopping, Label reading, Seasoning foods, Snacks, Supplement use     Learner:  Patient     Readiness to learn: Accepting and Eager     RD contact info provided: Yes     Outpatient referral: Outpatient Oncology RD       RD to follow per protocol.      Electronically signed by:  Citlali Frost RD  09/14/23 10:41 EDT

## 2023-09-14 NOTE — PROGRESS NOTES
Postoperative day 1 total gastrectomy for adenocarcinoma of gastric cardia    Awake and alert.  Pain well controlled.  Afebrile, vital signs stable and normal with exception of slightly high blood pressure.    Electrolytes normal.  WBC 18.8  Hemoglobin 13.7    Abdomen is soft.  Dressing is dry.  RONALD is serous.    Continue strict n.p.o. today  Nasogastric tube with almost no output, removed  Continue ambulation today

## 2023-09-14 NOTE — PLAN OF CARE
Problem: Adult Inpatient Plan of Care  Goal: Plan of Care Review  Outcome: Ongoing, Progressing  Flowsheets (Taken 9/14/2023 2974)  Progress: improving  Plan of Care Reviewed With: patient  Outcome Evaluation: Pt up ad navya. No c/o pain. Toradol scheduled. Ice pack to abd PRN. F/C d/c this a.m. and has void x2. Volume has not been much and he report feeling like bladder has emptied fully. NPO. Meds crushed in J-tube. RONALD drain with serosang output of 120mL this shift. NS @ 100/hr. Instructed and encouraged IS use. Abd dsng c/d/i.     Problem: Bleeding (Surgery Nonspecified)  Goal: Absence of Bleeding  Outcome: Ongoing, Progressing     Problem: Bowel Motility Impaired (Surgery Nonspecified)  Goal: Effective Bowel Elimination  Outcome: Ongoing, Progressing     Problem: Pain (Surgery Nonspecified)  Goal: Acceptable Pain Control  Outcome: Ongoing, Progressing  Intervention: Prevent or Manage Pain  Description: Develop mutual pain management plan with patient and caregiver; review regularly.  Consider the presence and impact of preexisting chronic pain.  Encourage patient and caregiver involvement in pain assessment, interventions and safety measures.  Individualize pharmacologic pain management plan; titrate medication to patient response.  Combine multimodal analgesia and nonpharmacologic strategies to help potentiate synergistic effects and enhance comfort (e.g., complementary therapy, diversional activity).  Offer around-the-clock dosing of pain medication to keep pain levels in control.  Manage medication-induced effects, such as constipation, nausea, vomiting.  Support and optimize psychosocial response to pain.  Recent Flowsheet Documentation  Taken 9/14/2023 0852 by Key Paniagua RN  Pain Management Interventions: cold applied  Diversional Activities:   television   tablet     Problem: Pain (Surgery Nonspecified)  Goal: Acceptable Pain Control  Intervention: Prevent or Manage Pain  Description: Develop  mutual pain management plan with patient and caregiver; review regularly.  Consider the presence and impact of preexisting chronic pain.  Encourage patient and caregiver involvement in pain assessment, interventions and safety measures.  Individualize pharmacologic pain management plan; titrate medication to patient response.  Combine multimodal analgesia and nonpharmacologic strategies to help potentiate synergistic effects and enhance comfort (e.g., complementary therapy, diversional activity).  Offer around-the-clock dosing of pain medication to keep pain levels in control.  Manage medication-induced effects, such as constipation, nausea, vomiting.  Support and optimize psychosocial response to pain.  Recent Flowsheet Documentation  Taken 9/14/2023 0852 by Key Paniagua RN  Pain Management Interventions: cold applied  Diversional Activities:   television   tablet     Problem: Postoperative Urinary Retention (Surgery Nonspecified)  Goal: Effective Urinary Elimination  Outcome: Ongoing, Progressing   Goal Outcome Evaluation:  Plan of Care Reviewed With: patient        Progress: improving  Outcome Evaluation: Pt up ad navya. No c/o pain. Toradol scheduled. Ice pack to abd PRN. F/C d/c this a.m. and has void x2. Volume has not been much and he report feeling like bladder has emptied fully. NPO. Meds crushed in J-tube. RONALD drain with serosang output of 120mL this shift. NS @ 100/hr. Instructed and encouraged IS use. Abd dsng c/d/i.

## 2023-09-15 LAB
ANION GAP SERPL CALCULATED.3IONS-SCNC: 12.4 MMOL/L (ref 5–15)
BUN SERPL-MCNC: 13 MG/DL (ref 8–23)
BUN/CREAT SERPL: 17.3 (ref 7–25)
CALCIUM SPEC-SCNC: 9.1 MG/DL (ref 8.6–10.5)
CHLORIDE SERPL-SCNC: 108 MMOL/L (ref 98–107)
CO2 SERPL-SCNC: 20.6 MMOL/L (ref 22–29)
CREAT SERPL-MCNC: 0.75 MG/DL (ref 0.76–1.27)
DEPRECATED RDW RBC AUTO: 62.9 FL (ref 37–54)
EGFRCR SERPLBLD CKD-EPI 2021: 95.9 ML/MIN/1.73
ERYTHROCYTE [DISTWIDTH] IN BLOOD BY AUTOMATED COUNT: 18.9 % (ref 12.3–15.4)
GLUCOSE SERPL-MCNC: 107 MG/DL (ref 65–99)
HCT VFR BLD AUTO: 41.8 % (ref 37.5–51)
HGB BLD-MCNC: 14 G/DL (ref 13–17.7)
MCH RBC QN AUTO: 30.4 PG (ref 26.6–33)
MCHC RBC AUTO-ENTMCNC: 33.5 G/DL (ref 31.5–35.7)
MCV RBC AUTO: 90.9 FL (ref 79–97)
PLATELET # BLD AUTO: 207 10*3/MM3 (ref 140–450)
PMV BLD AUTO: 10 FL (ref 6–12)
POTASSIUM SERPL-SCNC: 3.9 MMOL/L (ref 3.5–5.2)
RBC # BLD AUTO: 4.6 10*6/MM3 (ref 4.14–5.8)
SODIUM SERPL-SCNC: 141 MMOL/L (ref 136–145)
WBC NRBC COR # BLD: 11.47 10*3/MM3 (ref 3.4–10.8)

## 2023-09-15 PROCEDURE — 85027 COMPLETE CBC AUTOMATED: CPT | Performed by: SURGERY

## 2023-09-15 PROCEDURE — 25010000002 ENOXAPARIN PER 10 MG: Performed by: SURGERY

## 2023-09-15 PROCEDURE — 25010000002 KETOROLAC TROMETHAMINE PER 15 MG: Performed by: SURGERY

## 2023-09-15 PROCEDURE — 80048 BASIC METABOLIC PNL TOTAL CA: CPT | Performed by: SURGERY

## 2023-09-15 PROCEDURE — 63710000001 PREDNISONE PER 5 MG: Performed by: SURGERY

## 2023-09-15 PROCEDURE — 25010000002 HYDRALAZINE PER 20 MG: Performed by: SURGERY

## 2023-09-15 RX ORDER — KETOROLAC TROMETHAMINE 30 MG/ML
15 INJECTION, SOLUTION INTRAMUSCULAR; INTRAVENOUS EVERY 6 HOURS
Status: DISPENSED | OUTPATIENT
Start: 2023-09-15 | End: 2023-09-16

## 2023-09-15 RX ORDER — HYDRALAZINE HYDROCHLORIDE 20 MG/ML
10 INJECTION INTRAMUSCULAR; INTRAVENOUS EVERY 6 HOURS PRN
Status: DISCONTINUED | OUTPATIENT
Start: 2023-09-15 | End: 2023-09-18 | Stop reason: HOSPADM

## 2023-09-15 RX ADMIN — ENOXAPARIN SODIUM 40 MG: 100 INJECTION SUBCUTANEOUS at 09:04

## 2023-09-15 RX ADMIN — SODIUM CHLORIDE 100 ML/HR: 9 INJECTION, SOLUTION INTRAVENOUS at 09:04

## 2023-09-15 RX ADMIN — KETOROLAC TROMETHAMINE 15 MG: 30 INJECTION, SOLUTION INTRAMUSCULAR at 13:12

## 2023-09-15 RX ADMIN — VALSARTAN 160 MG: 160 TABLET, FILM COATED ORAL at 09:03

## 2023-09-15 RX ADMIN — KETOROLAC TROMETHAMINE 15 MG: 30 INJECTION, SOLUTION INTRAMUSCULAR; INTRAVENOUS at 00:23

## 2023-09-15 RX ADMIN — PREDNISONE 5 MG: 5 TABLET ORAL at 09:03

## 2023-09-15 RX ADMIN — HYDRALAZINE HYDROCHLORIDE 10 MG: 20 INJECTION, SOLUTION INTRAMUSCULAR; INTRAVENOUS at 09:04

## 2023-09-15 RX ADMIN — METOPROLOL TARTRATE 25 MG: 25 TABLET, FILM COATED ORAL at 20:20

## 2023-09-15 RX ADMIN — KETOROLAC TROMETHAMINE 15 MG: 30 INJECTION, SOLUTION INTRAMUSCULAR; INTRAVENOUS at 07:11

## 2023-09-15 RX ADMIN — KETOROLAC TROMETHAMINE 15 MG: 30 INJECTION, SOLUTION INTRAMUSCULAR at 18:26

## 2023-09-15 RX ADMIN — METOPROLOL TARTRATE 25 MG: 25 TABLET, FILM COATED ORAL at 09:03
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
## 2023-09-15 NOTE — PLAN OF CARE
Problem: Adult Inpatient Plan of Care  Goal: Plan of Care Review  Outcome: Ongoing, Progressing  Flowsheets  Taken 9/15/2023 1628  Progress: improving  Outcome Evaluation: Up ad navya. Scheduled Toradol. No PRN needed for pain. Voiding. Ice chips started. RONALD x1 with serosang output. NS @ 100/hr.  Taken 9/14/2023 1755  Plan of Care Reviewed With: patient  Goal: Patient-Specific Goal (Individualized)  Outcome: Ongoing, Progressing  Goal: Absence of Hospital-Acquired Illness or Injury  Outcome: Ongoing, Progressing  Intervention: Identify and Manage Fall Risk  Description: Perform standard risk assessment on admission using a validated tool or comprehensive approach appropriate to the patient; reassess fall risk frequently, with change in status or transfer to another level of care.  Communicate fall injury risk to interprofessional healthcare team.  Determine need for increased observation, equipment and environmental modification, such as low bed, signage and supportive, nonskid footwear.  Adjust safety measures to individual developmental age, stage and identified risk factors.  Reinforce the importance of safety and physical activity with patient and family.  Perform regular intentional rounding to assess need for position change, pain assessment and personal needs, including assistance with toileting.  Recent Flowsheet Documentation  Taken 9/15/2023 1422 by Key Paniagua, RN  Safety Promotion/Fall Prevention:   safety round/check completed   room organization consistent  Taken 9/15/2023 1240 by Key Paniagua, RN  Safety Promotion/Fall Prevention:   safety round/check completed   room organization consistent  Taken 9/15/2023 1002 by Key Paniagua, RN  Safety Promotion/Fall Prevention:   safety round/check completed   room organization consistent  Taken 9/15/2023 0859 by Key Paniagua, RN  Safety Promotion/Fall Prevention:   safety round/check completed   room organization consistent   nonskid  shoes/slippers when out of bed   clutter free environment maintained   assistive device/personal items within reach   activity supervised  Intervention: Prevent Skin Injury  Description: Perform a screening for skin injury risk, such as pressure or moisture associated skin damage on admission and at regular intervals throughout hospital stay.  Keep all areas of skin (especially folds) clean and dry.  Maintain adequate skin hydration.  Relieve and redistribute pressure and protect bony prominences; implement measures based on patient-specific risk factors.  Match turning and repositioning schedule to clinical condition.  Encourage weight shift frequently; assist with reposition if unable to complete independently.  Float heels off bed; avoid pressure on the Achilles tendon.  Keep skin free from extended contact with medical devices.  Encourage functional activity and mobility, as early as tolerated.  Use aids (e.g., slide boards, mechanical lift) during transfer.  Recent Flowsheet Documentation  Taken 9/15/2023 1422 by Key Paniagua RN  Body Position: position changed independently  Taken 9/15/2023 1240 by Key Paniagua RN  Body Position: position changed independently  Intervention: Prevent and Manage VTE (Venous Thromboembolism) Risk  Description: Assess for VTE (venous thromboembolism) risk.  Encourage and assist with early ambulation.  Initiate and maintain compression or other therapy, as indicated, based on identified risk in accordance with organizational protocol and provider order.  Encourage both active and passive leg exercises while in bed, if unable to ambulate.  Recent Flowsheet Documentation  Taken 9/15/2023 1455 by Key Paniagua RN  Activity Management: ambulated outside room  Taken 9/15/2023 1422 by Key Paniagua RN  Activity Management: up ad navya  Taken 9/15/2023 1240 by Key Paniagua RN  Activity Management: up ad navya  Taken 9/15/2023 1002 by Key Paniagua RN  Activity  Management: up ad navya  Taken 9/15/2023 0859 by Key Paniagua RN  Activity Management:   up ad navya   ambulated in room  VTE Prevention/Management: (up ad navya. Lovenox) sequential compression devices off  Goal: Optimal Comfort and Wellbeing  Outcome: Ongoing, Progressing  Intervention: Provide Person-Centered Care  Description: Use a family-focused approach to care.  Develop trust and rapport by proactively providing information, encouraging questions, addressing concerns and offering reassurance.  Acknowledge emotional response to hospitalization.  Recognize and utilize personal coping strategies.  Honor spiritual and cultural preferences.  Recent Flowsheet Documentation  Taken 9/15/2023 0859 by Key Paniagua RN  Trust Relationship/Rapport:   care explained   questions answered   thoughts/feelings acknowledged  Goal: Readiness for Transition of Care  Outcome: Ongoing, Progressing     Problem: Skin Injury Risk Increased  Goal: Skin Health and Integrity  Outcome: Ongoing, Progressing     Problem: Bleeding (Surgery Nonspecified)  Goal: Absence of Bleeding  Outcome: Ongoing, Progressing     Problem: Bowel Motility Impaired (Surgery Nonspecified)  Goal: Effective Bowel Elimination  Outcome: Ongoing, Progressing     Problem: Fluid and Electrolyte Imbalance (Surgery Nonspecified)  Goal: Fluid and Electrolyte Balance  Outcome: Ongoing, Progressing     Problem: Infection (Surgery Nonspecified)  Goal: Absence of Infection Signs and Symptoms  Outcome: Ongoing, Progressing     Problem: Pain (Surgery Nonspecified)  Goal: Acceptable Pain Control  Outcome: Ongoing, Progressing  Intervention: Prevent or Manage Pain  Description: Develop mutual pain management plan with patient and caregiver; review regularly.  Consider the presence and impact of preexisting chronic pain.  Encourage patient and caregiver involvement in pain assessment, interventions and safety measures.  Individualize pharmacologic pain management plan;  titrate medication to patient response.  Combine multimodal analgesia and nonpharmacologic strategies to help potentiate synergistic effects and enhance comfort (e.g., complementary therapy, diversional activity).  Offer around-the-clock dosing of pain medication to keep pain levels in control.  Manage medication-induced effects, such as constipation, nausea, vomiting.  Support and optimize psychosocial response to pain.  Recent Flowsheet Documentation  Taken 9/15/2023 0859 by Key Paniagua RN  Diversional Activities: tablet     Problem: Postoperative Urinary Retention (Surgery Nonspecified)  Goal: Effective Urinary Elimination  Outcome: Ongoing, Progressing     Problem: Respiratory Compromise (Surgery Nonspecified)  Goal: Effective Oxygenation and Ventilation  Outcome: Ongoing, Progressing   Goal Outcome Evaluation:  Plan of Care Reviewed With: patient        Progress: improving  Outcome Evaluation: Up ad navya. Scheduled Toradol. No PRN needed for pain. Voiding. Ice chips started. RONALD x1 with serosang output. NS @ 100/hr.

## 2023-09-16 PROCEDURE — 25010000002 ENOXAPARIN PER 10 MG: Performed by: SURGERY

## 2023-09-16 PROCEDURE — 63710000001 PREDNISONE PER 5 MG: Performed by: SURGERY

## 2023-09-16 PROCEDURE — 25010000002 HYDRALAZINE PER 20 MG: Performed by: SURGERY

## 2023-09-16 PROCEDURE — 25010000002 KETOROLAC TROMETHAMINE PER 15 MG: Performed by: SURGERY

## 2023-09-16 RX ORDER — DULOXETIN HYDROCHLORIDE 60 MG/1
60 CAPSULE, DELAYED RELEASE ORAL DAILY
Status: DISCONTINUED | OUTPATIENT
Start: 2023-09-16 | End: 2023-09-18 | Stop reason: HOSPADM

## 2023-09-16 RX ORDER — ROPINIROLE 2 MG/1
2 TABLET, FILM COATED ORAL NIGHTLY
Status: DISCONTINUED | OUTPATIENT
Start: 2023-09-16 | End: 2023-09-18 | Stop reason: HOSPADM

## 2023-09-16 RX ADMIN — SODIUM CHLORIDE 75 ML/HR: 9 INJECTION, SOLUTION INTRAVENOUS at 12:44

## 2023-09-16 RX ADMIN — HYDRALAZINE HYDROCHLORIDE 10 MG: 20 INJECTION, SOLUTION INTRAMUSCULAR; INTRAVENOUS at 18:54

## 2023-09-16 RX ADMIN — VALSARTAN 160 MG: 160 TABLET, FILM COATED ORAL at 09:26

## 2023-09-16 RX ADMIN — ENOXAPARIN SODIUM 40 MG: 100 INJECTION SUBCUTANEOUS at 09:26

## 2023-09-16 RX ADMIN — METOPROLOL TARTRATE 25 MG: 25 TABLET, FILM COATED ORAL at 20:28

## 2023-09-16 RX ADMIN — METOPROLOL TARTRATE 25 MG: 25 TABLET, FILM COATED ORAL at 09:26

## 2023-09-16 RX ADMIN — PREDNISONE 5 MG: 5 TABLET ORAL at 09:26

## 2023-09-16 RX ADMIN — ROPINIROLE 2 MG: 2 TABLET, FILM COATED ORAL at 23:05

## 2023-09-16 RX ADMIN — KETOROLAC TROMETHAMINE 15 MG: 30 INJECTION, SOLUTION INTRAMUSCULAR at 00:53

## 2023-09-16 RX ADMIN — DULOXETINE HYDROCHLORIDE 60 MG: 60 CAPSULE, DELAYED RELEASE ORAL at 23:05

## 2023-09-16 RX ADMIN — SODIUM CHLORIDE 100 ML/HR: 9 INJECTION, SOLUTION INTRAVENOUS at 00:57

## 2023-09-16 NOTE — PLAN OF CARE
Problem: Adult Inpatient Plan of Care  Goal: Plan of Care Review  Outcome: Ongoing, Progressing  Flowsheets (Taken 9/16/2023 1825)  Progress: improving  Plan of Care Reviewed With: patient  Outcome Evaluation: Pt was switched to a clear liquid diet. Tolerating well. Pt RONALD drain was removed. Meds placed through Gtube. Up ad navya. Urinal at bedside. Abd incision CDI. IV fluids NS decreased to 75mL. MD ok with patient taking recuip for restless leg by mouth. No pain or anxiety     Problem: Adult Inpatient Plan of Care  Goal: Patient-Specific Goal (Individualized)  Outcome: Ongoing, Progressing  Flowsheets (Taken 9/16/2023 1825)  Patient-Specific Goals (Include Timeframe): to feel better and go home  Individualized Care Needs: PRN and scheduled meds, IV fluids, full liquid diet

## 2023-09-16 NOTE — PROGRESS NOTES
Postoperative day 3 total gastrectomy for adenocarcinoma of the gastric cardia    Continues to do well with almost no pain.  Tolerating ice chips and popsicles.    Remains afebrile.  Pulse 67 and stable.  Blood pressure 165/87 and stable.    Abdomen soft.  Incision healing well with no evidence of infection.  RONALD drain is purely serous fluid and minimal output.    Start clear liquids today  DC RONALD drain

## 2023-09-16 NOTE — PLAN OF CARE
Goal Outcome Evaluation:   Pt up ad navya. No c/o pain. Toradol given.. .NPO w/ ice chips.   Meds crushed in J-tube.  NS @ 100/hr. Instructed and encouraged IS use. Abd dsng c/d/i.    Will continue to monitor

## 2023-09-17 PROCEDURE — 99024 POSTOP FOLLOW-UP VISIT: CPT | Performed by: SURGERY

## 2023-09-17 PROCEDURE — 63710000001 PREDNISONE PER 5 MG: Performed by: SURGERY

## 2023-09-17 PROCEDURE — 25010000002 HYDRALAZINE PER 20 MG: Performed by: SURGERY

## 2023-09-17 PROCEDURE — 25010000002 ENOXAPARIN PER 10 MG: Performed by: SURGERY

## 2023-09-17 RX ORDER — PREDNISONE 2.5 MG/1
2.5 TABLET ORAL
Status: DISCONTINUED | OUTPATIENT
Start: 2023-09-17 | End: 2023-09-18 | Stop reason: HOSPADM

## 2023-09-17 RX ORDER — TAMSULOSIN HYDROCHLORIDE 0.4 MG/1
0.4 CAPSULE ORAL DAILY
Status: DISCONTINUED | OUTPATIENT
Start: 2023-09-17 | End: 2023-09-18 | Stop reason: HOSPADM

## 2023-09-17 RX ORDER — METOPROLOL SUCCINATE 50 MG/1
50 TABLET, EXTENDED RELEASE ORAL
Status: DISCONTINUED | OUTPATIENT
Start: 2023-09-17 | End: 2023-09-18 | Stop reason: HOSPADM

## 2023-09-17 RX ORDER — SPIRONOLACTONE 25 MG/1
25 TABLET ORAL DAILY
Status: DISCONTINUED | OUTPATIENT
Start: 2023-09-17 | End: 2023-09-18 | Stop reason: HOSPADM

## 2023-09-17 RX ORDER — HYDROCODONE BITARTRATE AND ACETAMINOPHEN 5; 325 MG/1; MG/1
1 TABLET ORAL EVERY 4 HOURS PRN
Status: DISCONTINUED | OUTPATIENT
Start: 2023-09-17 | End: 2023-09-18 | Stop reason: HOSPADM

## 2023-09-17 RX ORDER — VALSARTAN 160 MG/1
160 TABLET ORAL
Status: DISCONTINUED | OUTPATIENT
Start: 2023-09-17 | End: 2023-09-18 | Stop reason: HOSPADM

## 2023-09-17 RX ADMIN — ROPINIROLE 2 MG: 2 TABLET, FILM COATED ORAL at 20:42

## 2023-09-17 RX ADMIN — SPIRONOLACTONE 25 MG: 25 TABLET ORAL at 08:51

## 2023-09-17 RX ADMIN — SODIUM CHLORIDE 75 ML/HR: 9 INJECTION, SOLUTION INTRAVENOUS at 02:12

## 2023-09-17 RX ADMIN — PREDNISONE 2.5 MG: 2.5 TABLET ORAL at 08:51

## 2023-09-17 RX ADMIN — VALSARTAN 160 MG: 160 TABLET, FILM COATED ORAL at 08:51

## 2023-09-17 RX ADMIN — METOPROLOL SUCCINATE 50 MG: 50 TABLET, FILM COATED, EXTENDED RELEASE ORAL at 08:51

## 2023-09-17 RX ADMIN — TAMSULOSIN HYDROCHLORIDE 0.4 MG: 0.4 CAPSULE ORAL at 08:51

## 2023-09-17 RX ADMIN — HYDRALAZINE HYDROCHLORIDE 10 MG: 20 INJECTION, SOLUTION INTRAMUSCULAR; INTRAVENOUS at 00:24

## 2023-09-17 RX ADMIN — ENOXAPARIN SODIUM 40 MG: 100 INJECTION SUBCUTANEOUS at 08:52

## 2023-09-17 NOTE — PLAN OF CARE
Goal Outcome Evaluation:  Plan of Care Reviewed With: patient        Progress: improving     Pt AxOx4, calm and cooperative, VSS. Meds given whole by mouth with water. Pt tolerating full liquid diet. Ambulating independently in room. IVF discontinued. J-tube clamped. RN will continue to monitor.

## 2023-09-17 NOTE — PROGRESS NOTES
Doing well. Tolerating clears. No nausea or emesis.   Has remained afebrile, vital signs stable.   Abdomen soft, NT, ND, incision healing well.     •advance to full liquids  •diuresing well, d/c IVF  •switched all meds to po  •likely home tomorrow

## 2023-09-18 ENCOUNTER — READMISSION MANAGEMENT (OUTPATIENT)
Dept: CALL CENTER | Facility: HOSPITAL | Age: 73
End: 2023-09-18
Payer: MEDICARE

## 2023-09-18 VITALS
BODY MASS INDEX: 31.07 KG/M2 | DIASTOLIC BLOOD PRESSURE: 99 MMHG | TEMPERATURE: 98.8 F | SYSTOLIC BLOOD PRESSURE: 178 MMHG | HEART RATE: 75 BPM | OXYGEN SATURATION: 98 % | RESPIRATION RATE: 18 BRPM | WEIGHT: 205.03 LBS | HEIGHT: 68 IN

## 2023-09-18 PROCEDURE — 63710000001 PREDNISONE PER 5 MG: Performed by: SURGERY

## 2023-09-18 RX ADMIN — SPIRONOLACTONE 25 MG: 25 TABLET ORAL at 08:51

## 2023-09-18 RX ADMIN — VALSARTAN 160 MG: 160 TABLET, FILM COATED ORAL at 08:51

## 2023-09-18 RX ADMIN — METOPROLOL SUCCINATE 50 MG: 50 TABLET, FILM COATED, EXTENDED RELEASE ORAL at 08:51

## 2023-09-18 RX ADMIN — DULOXETINE HYDROCHLORIDE 60 MG: 60 CAPSULE, DELAYED RELEASE ORAL at 08:51

## 2023-09-18 RX ADMIN — PREDNISONE 2.5 MG: 2.5 TABLET ORAL at 08:51

## 2023-09-18 RX ADMIN — TAMSULOSIN HYDROCHLORIDE 0.4 MG: 0.4 CAPSULE ORAL at 08:51

## 2023-09-18 NOTE — DISCHARGE SUMMARY
DATE OF ADMIT:    9/13/2023    DATE OF DISCHARGE:    9/18/2023    DIAGNOSIS:    Gastric cardia adenocarcinoma    FINAL PATHOLOGY:    Invasive moderate to poorly differentiated adenocarcinoma with treatment effect measuring approximately 1 cm maximally.  Margins clear.  2 of 16 lymph nodes positive for metastatic adenocarcinoma, largest metastatic focus measures 4 mm maximally. yT1bN1 tumor.    PROCEDURES:    Open total gastrectomy with Steffany-en-Y esophagojejunostomy and jejunal feeding tube 9/13/2023    SUMMARY OF HOSPITAL COURSE:     Admitted following surgery.  Had a very uneventful postoperative course.  Started on clear liquids on postoperative day 3, discharged on full liquids and soft foods on postoperative day 5.  Was not requiring prescription pain medications.  Abdomen was soft and incision was healing well.    DIET: Full liquids and softer solid foods, frequent smaller meals recommended    ACTIVITY: Walking encouraged, no lifting or strenuous activity    MEDICATIONS: He was instructed to decrease his prednisone dose to 2.5 mg daily for 7 more days and then discontinue (he was on this for arthritis/carpal tunnel pain).  He was instructed to stop omeprazole.  He was to continue all of his other preadmission medications.    FOLLOW-UP: To be seen in 1 week for staple removal and checkup    Tito Mckenzie M.D.

## 2023-09-18 NOTE — DISCHARGE INSTR - ACTIVITY
Per Dr. Mckenzie  1. May shower anytime   2. Diet: full liquids and soft foods   3. My office will call him with postop appointment   4. No special care of feeding tube required, no flushing needed

## 2023-09-18 NOTE — PLAN OF CARE
Goal Outcome Evaluation: Patient VSS with no complaints of pain. Patient looking forward to discharge this AM. No new issues overnight.  RN will continue to monitor patient's progress.    Problem: Adult Inpatient Plan of Care  Goal: Plan of Care Review  Outcome: Ongoing, Progressing  Goal: Patient-Specific Goal (Individualized)  Outcome: Ongoing, Progressing  Goal: Absence of Hospital-Acquired Illness or Injury  Outcome: Ongoing, Progressing  Intervention: Identify and Manage Fall Risk  Recent Flowsheet Documentation  Taken 9/18/2023 0415 by Jessika Ojeda RN  Safety Promotion/Fall Prevention:   safety round/check completed   assistive device/personal items within reach   clutter free environment maintained   fall prevention program maintained  Taken 9/18/2023 0226 by Jessika Ojeda RN  Safety Promotion/Fall Prevention:   safety round/check completed   assistive device/personal items within reach   clutter free environment maintained   fall prevention program maintained  Taken 9/18/2023 0017 by Jessika Ojeda RN  Safety Promotion/Fall Prevention:   safety round/check completed   assistive device/personal items within reach   clutter free environment maintained   fall prevention program maintained  Taken 9/17/2023 2041 by Jessika Ojeda RN  Safety Promotion/Fall Prevention:   safety round/check completed   assistive device/personal items within reach   clutter free environment maintained   fall prevention program maintained  Intervention: Prevent Skin Injury  Recent Flowsheet Documentation  Taken 9/17/2023 2041 by Jessika Ojeda RN  Body Position: supine  Goal: Optimal Comfort and Wellbeing  Outcome: Ongoing, Progressing  Intervention: Monitor Pain and Promote Comfort  Recent Flowsheet Documentation  Taken 9/17/2023 2041 by Jessika Ojeda RN  Pain Management Interventions: medication offered but refused  Goal: Readiness for Transition of Care  Outcome: Ongoing, Progressing     Problem: Skin Injury Risk  Increased  Goal: Skin Health and Integrity  Outcome: Ongoing, Progressing  Intervention: Optimize Skin Protection  Recent Flowsheet Documentation  Taken 9/17/2023 2041 by Jessika Ojeda RN  Head of Bed ([facility]) Positioning: HOB elevated     Problem: Bleeding (Surgery Nonspecified)  Goal: Absence of Bleeding  Outcome: Ongoing, Progressing     Problem: Bowel Motility Impaired (Surgery Nonspecified)  Goal: Effective Bowel Elimination  Outcome: Ongoing, Progressing     Problem: Fluid and Electrolyte Imbalance (Surgery Nonspecified)  Goal: Fluid and Electrolyte Balance  Outcome: Ongoing, Progressing     Problem: Infection (Surgery Nonspecified)  Goal: Absence of Infection Signs and Symptoms  Outcome: Ongoing, Progressing     Problem: Pain (Surgery Nonspecified)  Goal: Acceptable Pain Control  Outcome: Ongoing, Progressing  Intervention: Prevent or Manage Pain  Recent Flowsheet Documentation  Taken 9/17/2023 2041 by Jessika Ojeda RN  Pain Management Interventions: medication offered but refused     Problem: Postoperative Urinary Retention (Surgery Nonspecified)  Goal: Effective Urinary Elimination  Outcome: Ongoing, Progressing     Problem: Respiratory Compromise (Surgery Nonspecified)  Goal: Effective Oxygenation and Ventilation  Outcome: Ongoing, Progressing  Intervention: Optimize Oxygenation and Ventilation  Recent Flowsheet Documentation  Taken 9/17/2023 2041 by Jessika Ojeda, RN  Head of Bed ([facility]) Positioning: HOB elevated

## 2023-09-19 NOTE — OUTREACH NOTE
Prep Survey      Flowsheet Row Responses   [facility] facility patient discharged from? [place]   Is LACE score < 7 ? No   Eligibility Readm Mgmt   Discharge diagnosis GASTRECTOMY WITH FEEDING JEJUNOSTOMY PLACEMENT, CA of gastric cardia   Does the patient have one of the following disease processes/diagnoses(primary or secondary)? General Surgery   Does the patient have Home health ordered? No   Prep survey completed? Yes            Yasemin LINARES - Registered Nurse

## 2023-09-19 NOTE — CASE MANAGEMENT/SOCIAL WORK
Case Management Discharge Note      Final Note: dc home         Selected Continued Care - Discharged on 9/18/2023 Admission date: 9/13/2023 - Discharge disposition: Home or Self Care      Destination    No services have been selected for the patient.                Durable Medical Equipment    No services have been selected for the patient.                Dialysis/Infusion    No services have been selected for the patient.                Home Medical Care    No services have been selected for the patient.                Therapy    No services have been selected for the patient.                Community Resources    No services have been selected for the patient.                Community & DME    No services have been selected for the patient.                         Final Discharge Disposition Code: 01 - home or self-care

## 2023-09-21 ENCOUNTER — READMISSION MANAGEMENT (OUTPATIENT)
Dept: CALL CENTER | Facility: HOSPITAL | Age: 73
End: 2023-09-21
Payer: MEDICARE

## 2023-09-21 NOTE — OUTREACH NOTE
General Surgery Week 1 Survey      Flowsheet Row Responses   [facility] patient discharged from? [place]   Does the patient have one of the following disease processes/diagnoses(primary or secondary)? General Surgery   Week 1 attempt successful? Yes   Call start time 1726   Call end time 1733   Discharge diagnosis GASTRECTOMY WITH FEEDING JEJUNOSTOMY PLACEMENT, CA of gastric cardia   Meds reviewed with patient/caregiver? Yes   Is the patient having any side effects they believe may be caused by any medication additions or changes? No   Does the patient have all medications related to this admission filled (includes all antibiotics, pain medications, etc.) Yes   Is the patient taking all medications as directed (includes completed medication regime)? Yes   Does the patient have a follow up appointment scheduled with their surgeon? Yes   Has the patient kept scheduled appointments due by today? N/A   Has home health visited the patient within 72 hours of discharge? N/A   Psychosocial issues? No   Did the patient receive a copy of their discharge instructions? Yes   Nursing interventions Reviewed instructions with patient   What is the patient's perception of their health status since discharge? Improving   Nursing interventions Nurse provided patient education   Is the patient /caregiver able to teach back basic post-op care? Lifting as instructed by MD in discharge instructions, Do not remove steri-strips, Keep incision areas clean,dry and protected, No tub bath, swimming, or hot tub until instructed by MD, Take showers only when approved by MD-sponge bathe until then, Drive as instructed by MD in discharge instructions, Practice 'cough and deep breath', Continue use of incentive spirometry at least 1 week post discharge   Is the patient/caregiver able to teach back signs and symptoms of incisional infection? Increased redness, swelling or pain at the incisonal site, Increased drainage or bleeding,  Incisional warmth, Pus or odor from incision, Fever   Is the patient/caregiver able to teach back steps to recovery at home? Set small, achievable goals for return to baseline health, Rest and rebuild strength, gradually increase activity, Eat a well-balance diet   If the patient is a current smoker, are they able to teach back resources for cessation? Not a smoker   Is the patient/caregiver able to teach back the hierarchy of who to call/visit for symptoms/problems? PCP, Specialist, Home health nurse, Urgent Care, ED, 911 Yes   Additional teach back comments asked for specific dietary instructions for post Steffany-en-Y esophagojejunostomy and jejunal feeding tube surgery, having gas and bloating after PO meals, , not sure what to eat, advised pt to call surgeon's office and request dietary professional consult, pt agreed with plan   Week 1 call completed? Yes   Call end time [phone]            Brenna LINARES - Registered Nurse

## 2023-09-27 ENCOUNTER — OFFICE VISIT (OUTPATIENT)
Dept: SURGERY | Facility: CLINIC | Age: 73
End: 2023-09-27
Payer: MEDICARE

## 2023-09-27 VITALS
DIASTOLIC BLOOD PRESSURE: 90 MMHG | SYSTOLIC BLOOD PRESSURE: 150 MMHG | BODY MASS INDEX: 28.31 KG/M2 | HEIGHT: 68 IN | WEIGHT: 186.8 LBS

## 2023-09-27 DIAGNOSIS — Z48.89 POSTOPERATIVE VISIT: Primary | ICD-10-CM

## 2023-09-27 DIAGNOSIS — C16.9 MALIGNANT NEOPLASM OF STOMACH, UNSPECIFIED LOCATION: ICD-10-CM

## 2023-09-27 DIAGNOSIS — C16.0 MALIGNANT NEOPLASM OF CARDIA OF STOMACH: Primary | ICD-10-CM

## 2023-09-27 PROCEDURE — 1159F MED LIST DOCD IN RCRD: CPT | Performed by: SURGERY

## 2023-09-27 PROCEDURE — 99024 POSTOP FOLLOW-UP VISIT: CPT | Performed by: SURGERY

## 2023-09-27 PROCEDURE — 1160F RVW MEDS BY RX/DR IN RCRD: CPT | Performed by: SURGERY

## 2023-09-28 NOTE — PROGRESS NOTES
Postoperative visit    Open total gastrectomy with Steffany-en-Y esophagojejunostomy and jejunal feeding tube 9/13/2023    Pathology:  Invasive moderate to poorly differentiated adenocarcinoma with treatment effect measuring approximately 1 cm maximally.  Margins clear.  2 of 16 lymph nodes positive for metastatic adenocarcinoma, largest metastatic focus measures 4 mm maximally. yT1bN1 tumor.    Office visit: Incision has healed beautifully and staples were removed.  J-tube has never been used and was removed.  He is tolerating regular diet and learning to eat smaller amounts more frequently but having a remarkable lack of any issues regarding his dietary intake.  Overall, he is done as well as any individual I have seen after this type of procedure.  I have spoken with Dr. Ludwig who will make an appointment with him to discuss pathology report and further treatment as well as long-term follow-up.  He will return here as needed.

## 2023-09-29 ENCOUNTER — READMISSION MANAGEMENT (OUTPATIENT)
Dept: CALL CENTER | Facility: HOSPITAL | Age: 73
End: 2023-09-29
Payer: MEDICARE

## 2023-09-29 NOTE — OUTREACH NOTE
General Surgery Week 2 Survey      Flowsheet Row Responses   [facility] patient discharged from? [place]   Does the patient have one of the following disease processes/diagnoses(primary or secondary)? General Surgery   Week 2 attempt successful? No   Unsuccessful attempts Attempt 1            Kaitlyn FONSECA - Registered Nurse

## 2023-10-04 ENCOUNTER — READMISSION MANAGEMENT (OUTPATIENT)
Dept: CALL CENTER | Facility: HOSPITAL | Age: 73
End: 2023-10-04
Payer: MEDICARE

## 2023-10-04 NOTE — OUTREACH NOTE
General Surgery Week 2 Survey      Flowsheet Row Responses   [facility] patient discharged from? [place]   Does the patient have one of the following disease processes/diagnoses(primary or secondary)? General Surgery   Week 2 attempt successful? Yes   Call start time 0944   Call end time 0946   Discharge diagnosis GASTRECTOMY WITH FEEDING JEJUNOSTOMY PLACEMENT, CA of gastric cardia   Meds reviewed with patient/caregiver? Yes   Is the patient taking all medications as directed (includes completed medication regime)? Yes   Does the patient have a follow up appointment scheduled with their surgeon? Yes   Has the patient kept scheduled appointments due by today? Yes   Has home health visited the patient within 72 hours of discharge? N/A   Psychosocial issues? No   Did the patient receive a copy of their discharge instructions? Yes   Nursing interventions Reviewed instructions with patient   What is the patient's perception of their health status since discharge? Improving   Nursing interventions Nurse provided patient education   Is the patient /caregiver able to teach back basic post-op care? Lifting as instructed by MD in discharge instructions, Take showers only when approved by MD-sponge bathe until then, No tub bath, swimming, or hot tub until instructed by MD   Is the patient/caregiver able to teach back signs and symptoms of incisional infection? Increased redness, swelling or pain at the incisonal site, Increased drainage or bleeding, Incisional warmth, Pus or odor from incision, Fever   Is the patient/caregiver able to teach back steps to recovery at home? Set small, achievable goals for return to baseline health, Eat a well-balance diet   Is the patient/caregiver able to teach back the hierarchy of who to call/visit for symptoms/problems? PCP, Specialist, Home health nurse, Urgent Care, ED, 911 Yes   Week 2 call completed? Yes   Wrap up additional comments Pt doing well. Staples have been removed as  well as Feeding tube.   Call end time [phone]            LORI SPENCE - Registered Nurse

## 2023-10-16 NOTE — PROGRESS NOTES
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 remained active and continues to eat and drink well.  He does not feel that he needs any IV fluids today.  The patient is seen 7/11/2023 for his second cycle of FOLFOX chemotherapy.  7/25/2023 cycle 3 neoadjuvant FOLFOX (out of 4 preoperative cycles planned).  Tolerating well.  We went on to proceed with cycle 3 FOLFOX and plans for the patient to be seen 2 weeks later.  In the meantime he has been seen by Dr. Vraela anticipating surgical resection to require total gastrectomy, partial [place] Arn[place]fo esophagectomy and Steffany-en-Y reconstruction.  Currently the patient is scheduled for a PET 8/14/2023 and review by Dr. Varela 8/17/2023.  The patient is next seen 8/9/2023.  He is ready to proceed with his fourth cycle of therapy and subsequent PET/CT.  His case has been discussed with  as well as to his plans described above.  The patient proceeded to PET/CT 8/14/2023 to document resolution of focal hypermetabolism along the gastric lesser curvature, subcentimeter upper abdominal retroperitoneal lymphadenopathy was too small to characterize as were unchanged 5 to 6 mm right lower lobe solid pulmonary nodules.  The patient was admitted 9/13-9/18 undergoing Noble total gastrectomy with Steffany-en-Y, esophagojejunostomy and jejunal feeding tube placement.  Pathology revealed the stomach with a total gastrectomy and omentectomy-invasive moderately to  poorly differentiated adenocarcinoma with treatment effect measuring 1 cm arising the background of intestinal metaplasia, associated low and high-grade dysplasia, margins free of dysplasia or malignancy, intestinal metaplasia present at proximal and distal margins excision, 2 of 16 lymph nodes positive for metastatic adenocarcinoma the largest focus measuring 4 mm, benign lobulated adipose tissue consistent with omentum negative for tumor.  Final stage: [id number].  The patient was seen back by Dr. Mckenzie 9/28/2023 with his incision healing and staples removed, J-tube had not been used and was removed patient was doing well enough to be referred back to proceed with his next portion of therapy-4 cycles of FOLFOX.  Patient seen 10/17/2023.He is seen formally 10/16/2023.  Fortunately he is doing exceedingly well and, while he is working to manage his new diet, is confirmed with Dr. Mckenzie that we can proceed with her second phase of chemotherapy with 4 additional cycles of FOLFOX.    *Venous access  patient underwent Mediport placement 6/23/2023   Patient's port evaluated 7/10/2023 having been accessed for week after last visit.  Fortunately the area in question is not significantly inflamed nor fluctuant.  We have deaccessed, cleaned the site, reaccessed and plan to proceed with blood cultures pending.  Size reevaluated 8/8/2023 without additional information    After discussion plan:  1 week return for FOLFOX, NP assessment  Plans are to proceed with 4 additional treatments  Discussed with radiation therapy concerning additional treatment though this is now becoming more frequent used only in cases that had positive margins or inadequate lymph node dissection.      Patient is on a high risk medication requiring close monitoring for toxicity.

## 2023-10-17 ENCOUNTER — OFFICE VISIT (OUTPATIENT)
Dept: ONCOLOGY | Facility: CLINIC | Age: 73
End: 2023-10-17
Payer: MEDICARE

## 2023-10-17 ENCOUNTER — LAB (OUTPATIENT)
Dept: ONCOLOGY | Facility: HOSPITAL | Age: 73
End: 2023-10-17
Payer: MEDICARE

## 2023-10-17 VITALS
HEIGHT: 68 IN | SYSTOLIC BLOOD PRESSURE: 179 MMHG | RESPIRATION RATE: 18 BRPM | BODY MASS INDEX: 27.93 KG/M2 | DIASTOLIC BLOOD PRESSURE: 103 MMHG | WEIGHT: 184.3 LBS | OXYGEN SATURATION: 99 % | TEMPERATURE: 98.6 F | HEART RATE: 76 BPM

## 2023-10-17 DIAGNOSIS — C16.9 GASTRIC ADENOCARCINOMA: Primary | ICD-10-CM

## 2023-10-17 DIAGNOSIS — Z45.2 FITTING AND ADJUSTMENT OF VASCULAR CATHETER: Primary | ICD-10-CM

## 2023-10-17 DIAGNOSIS — C16.9 MALIGNANT NEOPLASM OF STOMACH, UNSPECIFIED LOCATION: ICD-10-CM

## 2023-10-17 DIAGNOSIS — C16.0 MALIGNANT NEOPLASM OF CARDIA OF STOMACH: ICD-10-CM

## 2023-10-17 LAB
ALBUMIN SERPL-MCNC: 4 G/DL (ref 3.5–5.2)
ALBUMIN/GLOB SERPL: 1.5 G/DL
ALP SERPL-CCNC: 97 U/L (ref 39–117)
ALT SERPL W P-5'-P-CCNC: 17 U/L (ref 1–41)
ANION GAP SERPL CALCULATED.3IONS-SCNC: 12.2 MMOL/L (ref 5–15)
AST SERPL-CCNC: 18 U/L (ref 1–40)
BASOPHILS # BLD AUTO: 0.03 10*3/MM3 (ref 0–0.2)
BASOPHILS NFR BLD AUTO: 0.5 % (ref 0–1.5)
BILIRUB SERPL-MCNC: 0.4 MG/DL (ref 0–1.2)
BUN SERPL-MCNC: 7 MG/DL (ref 8–23)
BUN/CREAT SERPL: 9.3 (ref 7–25)
CALCIUM SPEC-SCNC: 9.4 MG/DL (ref 8.6–10.5)
CHLORIDE SERPL-SCNC: 106 MMOL/L (ref 98–107)
CO2 SERPL-SCNC: 24.8 MMOL/L (ref 22–29)
CREAT SERPL-MCNC: 0.75 MG/DL (ref 0.76–1.27)
DEPRECATED RDW RBC AUTO: 52.6 FL (ref 37–54)
EGFRCR SERPLBLD CKD-EPI 2021: 95.9 ML/MIN/1.73
EOSINOPHIL # BLD AUTO: 0.36 10*3/MM3 (ref 0–0.4)
EOSINOPHIL NFR BLD AUTO: 5.6 % (ref 0.3–6.2)
ERYTHROCYTE [DISTWIDTH] IN BLOOD BY AUTOMATED COUNT: 15.6 % (ref 12.3–15.4)
GLOBULIN UR ELPH-MCNC: 2.6 GM/DL
GLUCOSE SERPL-MCNC: 115 MG/DL (ref 65–99)
HCT VFR BLD AUTO: 38.8 % (ref 37.5–51)
HGB BLD-MCNC: 12.5 G/DL (ref 13–17.7)
IMM GRANULOCYTES # BLD AUTO: 0.01 10*3/MM3 (ref 0–0.05)
IMM GRANULOCYTES NFR BLD AUTO: 0.2 % (ref 0–0.5)
LYMPHOCYTES # BLD AUTO: 1.68 10*3/MM3 (ref 0.7–3.1)
LYMPHOCYTES NFR BLD AUTO: 26.2 % (ref 19.6–45.3)
MCH RBC QN AUTO: 29.5 PG (ref 26.6–33)
MCHC RBC AUTO-ENTMCNC: 32.2 G/DL (ref 31.5–35.7)
MCV RBC AUTO: 91.5 FL (ref 79–97)
MONOCYTES # BLD AUTO: 0.57 10*3/MM3 (ref 0.1–0.9)
MONOCYTES NFR BLD AUTO: 8.9 % (ref 5–12)
NEUTROPHILS NFR BLD AUTO: 3.77 10*3/MM3 (ref 1.7–7)
NEUTROPHILS NFR BLD AUTO: 58.6 % (ref 42.7–76)
NRBC BLD AUTO-RTO: 0 /100 WBC (ref 0–0.2)
PLATELET # BLD AUTO: 184 10*3/MM3 (ref 140–450)
PMV BLD AUTO: 10.5 FL (ref 6–12)
POTASSIUM SERPL-SCNC: 3.3 MMOL/L (ref 3.5–5.2)
PROT SERPL-MCNC: 6.6 G/DL (ref 6–8.5)
RBC # BLD AUTO: 4.24 10*6/MM3 (ref 4.14–5.8)
SODIUM SERPL-SCNC: 143 MMOL/L (ref 136–145)
WBC NRBC COR # BLD: 6.42 10*3/MM3 (ref 3.4–10.8)

## 2023-10-17 PROCEDURE — 36591 DRAW BLOOD OFF VENOUS DEVICE: CPT

## 2023-10-17 PROCEDURE — 85025 COMPLETE CBC W/AUTO DIFF WBC: CPT | Performed by: INTERNAL MEDICINE

## 2023-10-17 PROCEDURE — 36415 COLL VENOUS BLD VENIPUNCTURE: CPT

## 2023-10-17 PROCEDURE — 80053 COMPREHEN METABOLIC PANEL: CPT | Performed by: INTERNAL MEDICINE

## 2023-10-17 PROCEDURE — 25010000002 HEPARIN LOCK FLUSH PER 10 UNITS: Performed by: INTERNAL MEDICINE

## 2023-10-17 RX ORDER — SODIUM CHLORIDE 0.9 % (FLUSH) 0.9 %
10 SYRINGE (ML) INJECTION AS NEEDED
OUTPATIENT
Start: 2023-10-17

## 2023-10-17 RX ORDER — SODIUM CHLORIDE 0.9 % (FLUSH) 0.9 %
10 SYRINGE (ML) INJECTION AS NEEDED
Status: DISCONTINUED | OUTPATIENT
Start: 2023-10-17 | End: 2023-10-17 | Stop reason: HOSPADM

## 2023-10-17 RX ORDER — HEPARIN SODIUM (PORCINE) LOCK FLUSH IV SOLN 100 UNIT/ML 100 UNIT/ML
500 SOLUTION INTRAVENOUS AS NEEDED
OUTPATIENT
Start: 2023-10-17

## 2023-10-17 RX ORDER — HEPARIN SODIUM (PORCINE) LOCK FLUSH IV SOLN 100 UNIT/ML 100 UNIT/ML
500 SOLUTION INTRAVENOUS AS NEEDED
Status: DISCONTINUED | OUTPATIENT
Start: 2023-10-17 | End: 2023-10-17 | Stop reason: HOSPADM

## 2023-10-17 RX ADMIN — Medication 500 UNITS: at 11:00

## 2023-10-17 RX ADMIN — Medication 10 ML: at 11:00

## 2023-10-23 NOTE — PROGRESS NOTES
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 reconstruction.  Seen back in office 6/27/2023 to proceed with chemotherapy-FOLFOX.  We discussed that he would be offered 4 cycles preoperative and 4 cycles postoperative pending tolerance.  His findings and course will be discussed with his surgeons as we proceed.  Patient reviewed back today following first cycle of FOLFOX.  He reports he is feeling well and has remained asymptomatic.  He has not noted any increased neuropathy from his baseline.  He denies any nausea, vomiting, increased arthralgias, cold sensation, or diarrhea.  He did have some mild constipation initially that resolved on its own.  He has remained active and continues to eat and drink well.  He does not feel that he needs any IV fluids today.  The patient is seen 7/11/2023 for his second cycle of FOLFOX chemotherapy.  7/25/2023 cycle 3 neoadjuvant FOLFOX (out of 4 preoperative cycles planned).  Tolerating well.  We went on to proceed with cycle 3 FOLFOX and plans for the patient to be seen 2 weeks later.  In the meantime he has been seen by Dr. Varela anticipating surgical resection to require total gastrectomy, partial Falcon Arnulfo esophagectomy and Steffany-en-Y reconstruction.  Currently the patient is scheduled for a PET 8/14/2023 and review by Dr. Varela 8/17/2023.  The patient is next seen 8/9/2023.  He is ready to proceed with his fourth cycle of therapy and  subsequent PET/CT.  His case has been discussed with  as well as to his plans described above.  The patient proceeded to PET/CT 8/14/2023 to document resolution of focal hypermetabolism along the gastric lesser curvature, subcentimeter upper abdominal retroperitoneal lymphadenopathy was too small to characterize as were unchanged 5 to 6 mm right lower lobe solid pulmonary nodules.  The patient was admitted 9/13-9/18 undergoing Noble total gastrectomy with Steffany-en-Y, esophagojejunostomy and jejunal feeding tube placement.  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  Final stage: dyM8cmD2.  The patient was seen back by Dr. Mckenzie 9/28/2023 with his incision healing and staples removed, J-tube had not been used and was removed patient was doing well enough to be referred back to proceed with his next portion of therapy-4 cycles of FOLFOX.  Patient seen 10/17/2023.  He is seen formally 10/16/2023.  Fortunately he is doing exceedingly well and, while he is working to manage his new diet, is confirmed with Dr. Mckenzie that we can proceed with her second phase of chemotherapy with 4 additional cycles of FOLFOX.  10/24/2023: Cycle 5 FOLFOX.  Was previously struggling with constipation, for the last week has had diarrhea up to 8 episodes daily.  Has not taken any antidiarrheals, reports he was unsure if he could take these following surgery.  Weight is down approximately 10 pounds since his last visit.  Admits he is not eating  much, as he typically has diarrhea after eating.  Also admits he is not drinking a lot of fluid, about 20 ounces daily.  He is scheduled to see [facility] dietitian following our visit today who plans to review dietary recommendations.  Recommended he start Imodium as needed for diarrhea.  If no improvement with Imodium plan to start Lomotil.  We will proceed with 500 cc normal saline prior to chemotherapy.   Magnesium checked today and normal at 1.9.    *Venous access  patient underwent Mediport placement 6/23/2023   Patient's port evaluated 7/10/2023 having been accessed for week after last visit.  Fortunately the area in question is not significantly inflamed nor fluctuant.  We have deaccessed, cleaned the site, reaccessed and plan to proceed with blood cultures pending.  Size reevaluated 8/8/2023 without additional information    *Hypertension-patient admits he has been noncompliant with his medications, does not take them regularly.  Blood pressure today 185/102.  He is fortunately asymptomatic.  Strongly encouraged him to resume blood pressure medications and close monitoring of blood pressure at home, reports he is agreeable with this.    *Hypokalemia-potassium today 3.1.  Patient has not been taking his oral potassium, encouraged him to resume potassium 20 mill equivalent tablets 1 tablet twice daily.    PLAN:   Cycle 5 FOLFOX today.  500 cc normal saline today.  Resume potassium 20 mill equivalent tablets 1 tablet twice daily.  Patient will follow-up with oncology dietitian in the infusion area today.  Start Imodium as needed for diarrhea.  Return in 1 week for NP visit and possible IV fluids.  We will reevaluate weight loss and diarrhea.  Return in 2 weeks for MD follow up and cycle 6 FOLFOX.  Plans are to proceed with 4 additional treatments    Patient is on a high risk medication requiring close monitoring for toxicity.    Ngozi Elizabeth, BHARAT  10/24/23

## 2023-10-24 ENCOUNTER — OFFICE VISIT (OUTPATIENT)
Dept: ONCOLOGY | Facility: CLINIC | Age: 73
End: 2023-10-24
Payer: MEDICARE

## 2023-10-24 ENCOUNTER — INFUSION (OUTPATIENT)
Dept: ONCOLOGY | Facility: HOSPITAL | Age: 73
End: 2023-10-24
Payer: MEDICARE

## 2023-10-24 ENCOUNTER — NUTRITION (OUTPATIENT)
Dept: OTHER | Facility: HOSPITAL | Age: 73
End: 2023-10-24
Payer: MEDICARE

## 2023-10-24 VITALS
BODY MASS INDEX: 27.08 KG/M2 | DIASTOLIC BLOOD PRESSURE: 102 MMHG | SYSTOLIC BLOOD PRESSURE: 185 MMHG | WEIGHT: 178.7 LBS | HEART RATE: 67 BPM | TEMPERATURE: 98 F | HEIGHT: 68 IN | OXYGEN SATURATION: 99 % | RESPIRATION RATE: 16 BRPM

## 2023-10-24 DIAGNOSIS — R63.4 UNINTENTIONAL WEIGHT LOSS: ICD-10-CM

## 2023-10-24 DIAGNOSIS — Z79.899 HIGH RISK MEDICATION USE: ICD-10-CM

## 2023-10-24 DIAGNOSIS — C16.9 GASTRIC ADENOCARCINOMA: Primary | ICD-10-CM

## 2023-10-24 DIAGNOSIS — C16.9 GASTRIC ADENOCARCINOMA: ICD-10-CM

## 2023-10-24 DIAGNOSIS — Z45.2 FITTING AND ADJUSTMENT OF VASCULAR CATHETER: Primary | ICD-10-CM

## 2023-10-24 LAB
ALBUMIN SERPL-MCNC: 4.2 G/DL (ref 3.5–5.2)
ALBUMIN/GLOB SERPL: 1.7 G/DL
ALP SERPL-CCNC: 100 U/L (ref 39–117)
ALT SERPL W P-5'-P-CCNC: 20 U/L (ref 1–41)
ANION GAP SERPL CALCULATED.3IONS-SCNC: 10.6 MMOL/L (ref 5–15)
AST SERPL-CCNC: 22 U/L (ref 1–40)
BASOPHILS # BLD AUTO: 0.04 10*3/MM3 (ref 0–0.2)
BASOPHILS NFR BLD AUTO: 0.6 % (ref 0–1.5)
BILIRUB SERPL-MCNC: 0.6 MG/DL (ref 0–1.2)
BUN SERPL-MCNC: 8 MG/DL (ref 8–23)
BUN/CREAT SERPL: 9.4 (ref 7–25)
CALCIUM SPEC-SCNC: 9.4 MG/DL (ref 8.6–10.5)
CHLORIDE SERPL-SCNC: 108 MMOL/L (ref 98–107)
CO2 SERPL-SCNC: 26.4 MMOL/L (ref 22–29)
CREAT SERPL-MCNC: 0.85 MG/DL (ref 0.76–1.27)
DEPRECATED RDW RBC AUTO: 51.5 FL (ref 37–54)
EGFRCR SERPLBLD CKD-EPI 2021: 92.3 ML/MIN/1.73
EOSINOPHIL # BLD AUTO: 0.32 10*3/MM3 (ref 0–0.4)
EOSINOPHIL NFR BLD AUTO: 4.6 % (ref 0.3–6.2)
ERYTHROCYTE [DISTWIDTH] IN BLOOD BY AUTOMATED COUNT: 15.3 % (ref 12.3–15.4)
GLOBULIN UR ELPH-MCNC: 2.5 GM/DL
GLUCOSE SERPL-MCNC: 107 MG/DL (ref 65–99)
HCT VFR BLD AUTO: 39.1 % (ref 37.5–51)
HGB BLD-MCNC: 12.6 G/DL (ref 13–17.7)
IMM GRANULOCYTES # BLD AUTO: 0.02 10*3/MM3 (ref 0–0.05)
IMM GRANULOCYTES NFR BLD AUTO: 0.3 % (ref 0–0.5)
LYMPHOCYTES # BLD AUTO: 2.04 10*3/MM3 (ref 0.7–3.1)
LYMPHOCYTES NFR BLD AUTO: 29.5 % (ref 19.6–45.3)
MAGNESIUM SERPL-MCNC: 1.9 MG/DL (ref 1.6–2.4)
MCH RBC QN AUTO: 29.5 PG (ref 26.6–33)
MCHC RBC AUTO-ENTMCNC: 32.2 G/DL (ref 31.5–35.7)
MCV RBC AUTO: 91.6 FL (ref 79–97)
MONOCYTES # BLD AUTO: 0.61 10*3/MM3 (ref 0.1–0.9)
MONOCYTES NFR BLD AUTO: 8.8 % (ref 5–12)
NEUTROPHILS NFR BLD AUTO: 3.88 10*3/MM3 (ref 1.7–7)
NEUTROPHILS NFR BLD AUTO: 56.2 % (ref 42.7–76)
NRBC BLD AUTO-RTO: 0 /100 WBC (ref 0–0.2)
PLATELET # BLD AUTO: 174 10*3/MM3 (ref 140–450)
PMV BLD AUTO: 10.7 FL (ref 6–12)
POTASSIUM SERPL-SCNC: 3.1 MMOL/L (ref 3.5–5.2)
PROT SERPL-MCNC: 6.7 G/DL (ref 6–8.5)
RBC # BLD AUTO: 4.27 10*6/MM3 (ref 4.14–5.8)
SODIUM SERPL-SCNC: 145 MMOL/L (ref 136–145)
WBC NRBC COR # BLD: 6.91 10*3/MM3 (ref 3.4–10.8)

## 2023-10-24 PROCEDURE — 96413 CHEMO IV INFUSION 1 HR: CPT

## 2023-10-24 PROCEDURE — 0 DEXTROSE 5 % SOLUTION: Performed by: NURSE PRACTITIONER

## 2023-10-24 PROCEDURE — 25010000002 PALONOSETRON PER 25 MCG: Performed by: NURSE PRACTITIONER

## 2023-10-24 PROCEDURE — 96411 CHEMO IV PUSH ADDL DRUG: CPT

## 2023-10-24 PROCEDURE — 96416 CHEMO PROLONG INFUSE W/PUMP: CPT

## 2023-10-24 PROCEDURE — 80053 COMPREHEN METABOLIC PANEL: CPT | Performed by: INTERNAL MEDICINE

## 2023-10-24 PROCEDURE — 25010000002 FOSAPREPITANT PER 1 MG: Performed by: NURSE PRACTITIONER

## 2023-10-24 PROCEDURE — 25010000002 FLUOROURACIL PER 500 MG: Performed by: NURSE PRACTITIONER

## 2023-10-24 PROCEDURE — 25010000002 LEUCOVORIN CALCIUM PER 50 MG: Performed by: NURSE PRACTITIONER

## 2023-10-24 PROCEDURE — G0498 CHEMO EXTEND IV INFUS W/PUMP: HCPCS

## 2023-10-24 PROCEDURE — 96415 CHEMO IV INFUSION ADDL HR: CPT

## 2023-10-24 PROCEDURE — 96367 TX/PROPH/DG ADDL SEQ IV INF: CPT

## 2023-10-24 PROCEDURE — 25810000003 SODIUM CHLORIDE 0.9 % SOLUTION 500 ML FLEX CONT: Performed by: NURSE PRACTITIONER

## 2023-10-24 PROCEDURE — 25810000003 SODIUM CHLORIDE 0.9 % SOLUTION: Performed by: NURSE PRACTITIONER

## 2023-10-24 PROCEDURE — 96417 CHEMO IV INFUS EACH ADDL SEQ: CPT

## 2023-10-24 PROCEDURE — 0 DEXTROSE 5 % SOLUTION 250 ML FLEX CONT: Performed by: NURSE PRACTITIONER

## 2023-10-24 PROCEDURE — 25010000002 OXALIPLATIN PER 0.5 MG: Performed by: NURSE PRACTITIONER

## 2023-10-24 PROCEDURE — 85025 COMPLETE CBC W/AUTO DIFF WBC: CPT | Performed by: INTERNAL MEDICINE

## 2023-10-24 PROCEDURE — 96375 TX/PRO/DX INJ NEW DRUG ADDON: CPT

## 2023-10-24 PROCEDURE — 25010000002 DEXAMETHASONE SODIUM PHOSPHATE 100 MG/10ML SOLUTION: Performed by: NURSE PRACTITIONER

## 2023-10-24 PROCEDURE — 96368 THER/DIAG CONCURRENT INF: CPT

## 2023-10-24 PROCEDURE — 83735 ASSAY OF MAGNESIUM: CPT | Performed by: INTERNAL MEDICINE

## 2023-10-24 RX ORDER — SODIUM CHLORIDE 0.9 % (FLUSH) 0.9 %
10 SYRINGE (ML) INJECTION AS NEEDED
Status: CANCELLED | OUTPATIENT
Start: 2023-10-24

## 2023-10-24 RX ORDER — HEPARIN SODIUM (PORCINE) LOCK FLUSH IV SOLN 100 UNIT/ML 100 UNIT/ML
500 SOLUTION INTRAVENOUS AS NEEDED
Status: CANCELLED | OUTPATIENT
Start: 2023-10-24

## 2023-10-24 RX ORDER — FLUOROURACIL 50 MG/ML
400 INJECTION, SOLUTION INTRAVENOUS ONCE
Status: CANCELLED | OUTPATIENT
Start: 2023-10-24

## 2023-10-24 RX ORDER — PALONOSETRON 0.05 MG/ML
0.25 INJECTION, SOLUTION INTRAVENOUS ONCE
Status: COMPLETED | OUTPATIENT
Start: 2023-10-24 | End: 2023-10-24

## 2023-10-24 RX ORDER — SODIUM CHLORIDE 9 MG/ML
500 INJECTION, SOLUTION INTRAVENOUS ONCE
Status: COMPLETED | OUTPATIENT
Start: 2023-10-24 | End: 2023-10-24

## 2023-10-24 RX ORDER — FAMOTIDINE 10 MG/ML
20 INJECTION, SOLUTION INTRAVENOUS AS NEEDED
Status: CANCELLED | OUTPATIENT
Start: 2023-10-24

## 2023-10-24 RX ORDER — HEPARIN SODIUM (PORCINE) LOCK FLUSH IV SOLN 100 UNIT/ML 100 UNIT/ML
500 SOLUTION INTRAVENOUS AS NEEDED
Status: DISCONTINUED | OUTPATIENT
Start: 2023-10-24 | End: 2023-10-24 | Stop reason: HOSPADM

## 2023-10-24 RX ORDER — DIPHENHYDRAMINE HYDROCHLORIDE 50 MG/ML
50 INJECTION INTRAMUSCULAR; INTRAVENOUS AS NEEDED
Status: CANCELLED | OUTPATIENT
Start: 2023-10-24

## 2023-10-24 RX ORDER — FAMOTIDINE 10 MG/ML
20 INJECTION, SOLUTION INTRAVENOUS AS NEEDED
Status: DISCONTINUED | OUTPATIENT
Start: 2023-10-24 | End: 2023-10-24 | Stop reason: HOSPADM

## 2023-10-24 RX ORDER — FLUOROURACIL 50 MG/ML
400 INJECTION, SOLUTION INTRAVENOUS ONCE
Status: COMPLETED | OUTPATIENT
Start: 2023-10-24 | End: 2023-10-24

## 2023-10-24 RX ORDER — PALONOSETRON 0.05 MG/ML
0.25 INJECTION, SOLUTION INTRAVENOUS ONCE
Status: CANCELLED | OUTPATIENT
Start: 2023-10-24

## 2023-10-24 RX ORDER — SODIUM CHLORIDE 0.9 % (FLUSH) 0.9 %
10 SYRINGE (ML) INJECTION AS NEEDED
Status: DISCONTINUED | OUTPATIENT
Start: 2023-10-24 | End: 2023-10-24 | Stop reason: HOSPADM

## 2023-10-24 RX ORDER — DEXTROSE MONOHYDRATE 50 MG/ML
250 INJECTION, SOLUTION INTRAVENOUS ONCE
Status: COMPLETED | OUTPATIENT
Start: 2023-10-24 | End: 2023-10-24

## 2023-10-24 RX ORDER — DEXTROSE MONOHYDRATE 50 MG/ML
250 INJECTION, SOLUTION INTRAVENOUS ONCE
Status: CANCELLED | OUTPATIENT
Start: 2023-10-24

## 2023-10-24 RX ADMIN — LEUCOVORIN CALCIUM 780 MG: 350 INJECTION, POWDER, LYOPHILIZED, FOR SOLUTION INTRAMUSCULAR; INTRAVENOUS at 11:47

## 2023-10-24 RX ADMIN — FLUOROURACIL 780 MG: 50 INJECTION, SOLUTION INTRAVENOUS at 13:54

## 2023-10-24 RX ADMIN — DEXTROSE MONOHYDRATE 250 ML: 50 INJECTION, SOLUTION INTRAVENOUS at 10:43

## 2023-10-24 RX ADMIN — DEXAMETHASONE SODIUM PHOSPHATE 12 MG: 10 INJECTION, SOLUTION INTRAMUSCULAR; INTRAVENOUS at 11:26

## 2023-10-24 RX ADMIN — PALONOSETRON 0.25 MG: 0.05 INJECTION, SOLUTION INTRAVENOUS at 11:24

## 2023-10-24 RX ADMIN — OXALIPLATIN 165 MG: 5 INJECTION, SOLUTION INTRAVENOUS at 11:48

## 2023-10-24 RX ADMIN — FLUOROURACIL 4660 MG: 50 INJECTION, SOLUTION INTRAVENOUS at 13:56

## 2023-10-24 RX ADMIN — SODIUM CHLORIDE 500 ML: 9 INJECTION, SOLUTION INTRAVENOUS at 10:20

## 2023-10-24 RX ADMIN — FOSAPREPITANT 100 ML: 150 INJECTION, POWDER, LYOPHILIZED, FOR SOLUTION INTRAVENOUS at 10:44

## 2023-10-24 NOTE — PROGRESS NOTES
OUTPATIENT ONCOLOGY NUTRITION ASSESSMENT    Patient Name: Mark Ken  YOB: 1950  MRN: [id number]  Assessment Date: 10/24/2023    COMMENTS: Met with patient today in the infusion area at [facility].   He is s/p total gastrectomy, partial esophagectomy, parvin en y on 9/13/23. He's lost about 20 lb since his surgery.    He has had intermittent constipation and is currently having diarrhea, shortly after he eats. He is learning to eat smaller portions more frequently but continues to have dumping symptoms.   Receiving FOLFOX today.  He says he did have some cold sensitivity with previous treatments that lasted one or two days.   He does not take any multivitamins or mineral supplements.   Lives alone. He is getting some meals from a neighbor and eats those most nights for dinner. He is not much of a cook.   Provided him with several days of sample meals as well as a list of foods to include and foods that may cause dumping.  He has been instructed to start taking imodium for diarrhea.   Encouraged small frequent meals, at least 6 times a day. He has used carnation instant breakfast in the past and we discussed that this could also be used as one of the small meals but would suggest using the sugar free variety. Emphasized the need to add protein at his snacks and meals and to start adding some variety to what he is eating. Discussed easy meals he can look for at the store, deli counter, etc.   Will follow up with him next week for tolerance.           Reason for Assessment Physician referral, New assessment, Education      Diagnosis/Problem   Gastric cancer   Treatment Plan Chemotherapy   Frequency FOLFOX every two weeks   Goal of cancer treatment Disease control   Comments:      MST = 2 more Patient at risk for malnutrition     Encounter Information        Nutrition/Diet History:     Oral Nutrition Supplements:    Factors/Symptoms Affecting Intake: Diarrhea, Early satiety, Vomiting, Weight loss    Comments:      Medical/Surgical History Past Medical History:   Diagnosis Date    Arthritis     BPH (benign prostatic hyperplasia)     Depression     Diabetes mellitus     Diarrhea     GERD (gastroesophageal reflux disease)     Gout     Hyperlipidemia     Hypertension     Neuropathy     bilat feet    Pulmonary arterial hypertension     Restless legs     Skin cancer, basal cell     Stomach cancer 2023    CHEMOTHERAPY       Past Surgical History:   Procedure Laterality Date    CARPAL TUNNEL RELEASE Left 01/19/2023    Procedure: LEFT CARPAL TUNNEL RELEASE AND SYNOVIAL BIOPSY;  Surgeon: Mikel Dupont MD;  Location: [facility];  Service: Hand;  Laterality: Left;    CARPAL TUNNEL RELEASE Right 03/02/2023    Procedure: RIGHT CARPAL TUNNEL RELEASE;  Surgeon: Mikel Dupont MD;  Location: [facility];  Service: Hand;  Laterality: Right;    CATARACT EXTRACTION WITH INTRAOCULAR LENS IMPLANT Bilateral     COLONOSCOPY N/A 03/09/2023    DIAGNOSTIC LAPAROSCOPY N/A 05/30/2023    Procedure: DIAGNOSTIC LAPAROSCOPY with peritoneal washing;  Surgeon: Tito Mckenzie MD;  Location: [facility] MAIN OR;  Service: General;  Laterality: N/A;    ENDOSCOPY N/A 04/27/2023    Dr. Reilly    ENDOSCOPY N/A 05/30/2023    Procedure: ESOPHAGOGASTRODUODENOSCOPY;  Surgeon: Tito Mckenzie MD;  Location: [facility] MAIN OR;  Service: General;  Laterality: N/A;    ESOPHAGOGASTRECTOMY N/A 9/13/2023    Procedure: Esophagogastroduodenoscopy;  Surgeon: Berry Varela MD;  Location[facility] MA[facility] OR;  Service: Thoracic;  Laterality: N/A;    GASTRECTOMY N/A 9/13/2023    Procedure: GASTRECTOMY WITH FEEDING JEJUNOSTOMY PLACEMENT;  Surgeon: Tito Mckenzie MD;  Location: [facility] MAIN OR;  Service: General;  Laterality: N/A;    KNEE ARTHROSCOPY Bilateral     TOTAL KNEE ARTHROPLASTY Right 02/21/2018    UPPER ENDOSCOPIC ULTRASOUND W/ FNA N/A 06/21/2023    Procedure: ENDOSCOPIC ULTRASOUND WITH STAGING AND FINE NEEDLE ASPIRATION;  Surgeon: Ayesha  "MD Kavon;  Location[facility] ENDOSCOPY;  Service: Gastroenterology;  Laterality: N/A;  Post:    VENOUS ACCESS DEVICE (PORT) INSERTION N/A 06/23/2023    Procedure: INSERTION VENOUS ACCESS DEVICE;  Surgeon: Berry Varela MD;  Location: [facility] OR;  Service: Thoracic;  Laterality: N/A;            Anthropometrics        Current Height Ht Readings from Last 1 Encounters:   10/24/23 172 cm (67.72\")      Current Weight Wt Readings from Last 1 Encounters:   10/24/23 81.1 kg (178 lb 11.2 oz)      BMI  27.4   Ideal Body Weight (IBW) 148 lb   Usual Body Weight (UBW) 200 lb   Weight Change/Trend Loss  22 lb weight loss in two months   Weight History Wt Readings from Last 30 Encounters:   10/24/23 0931 81.1 kg (178 lb 11.2 oz)   10/17/23 1111 83.6 kg (184 lb 4.8 oz)   09/27/23 1421 84.7 kg (186 lb 12.8 oz)   09/13/23 0710 93 kg (205 lb 0.4 oz)   09/06/23 0731 93.5 kg (206 lb 1.6 oz)   08/23/23 1659 90.4 kg (199 lb 6.4 oz)   08/17/23 0823 88.5 kg (195 lb)   08/08/23 0831 91.4 kg (201 lb 9.6 oz)   08/03/23 0847 90.7 kg (200 lb)   07/25/23 0800 91.5 kg (201 lb 12.8 oz)   07/11/23 0853 92.4 kg (203 lb 12.8 oz)   07/05/23 1104 92.5 kg (203 lb 14.4 oz)   06/29/23 0906 95 kg (209 lb 6.4 oz)   06/27/23 0745 94 kg (207 lb 3.2 oz)   06/21/23 1213 91.6 kg (202 lb)   06/15/23 1240 93.4 kg (206 lb)   06/20/23 1130 91.7 kg (202 lb 1.6 oz)   06/16/23 1409 93 kg (205 lb 1.6 oz)   06/13/23 1327 93.5 kg (206 lb 1.6 oz)   06/05/23 1319 92.5 kg (204 lb)   05/26/23 1430 95.2 kg (209 lb 12.8 oz)   03/02/23 0635 94.3 kg (208 lb)   02/27/23 1416 95.3 kg (210 lb)   01/19/23 0817 94.3 kg (207 lb 12.8 oz)   01/16/23 1433 95.3 kg (210 lb)   12/06/22 1033 104 kg (229 lb)   12/06/22 0839 104 kg (229 lb 8 oz)   12/06/22 0800 104 kg (229 lb 8 oz)   04/26/22 1521 93 kg (205 lb)          Medications           Current medications: DULoxetine, Unable to find, allopurinol, metoprolol succinate XL, potassium chloride, rOPINIRole, spironolactone, tamsulosin, " "and valsartan                 Tests/Procedures        Tests/Procedures Chemotherapy     Labs       Pertinent Labs    Results from last 7 days   Lab Units 10/24/23  0928   SODIUM mmol/L 145   POTASSIUM mmol/L 3.1*   CHLORIDE mmol/L 108*   CO2 mmol/L 26.4   BUN mg/dL 8   CREATININE mg/dL 0.85   CALCIUM mg/dL 9.4   BILIRUBIN mg/dL 0.6   ALK PHOS U/L 100   ALT (SGPT) U/L 20   AST (SGOT) U/L 22   GLUCOSE mg/dL 107*     Results from last 7 days   Lab Units 10/24/23  0928   MAGNESIUM mg/dL 1.9   HEMOGLOBIN g/dL 12.6*   HEMATOCRIT % 39.1   WBC 10*3/mm3 6.91   ALBUMIN g/dL 4.2     Results from last 7 days   Lab Units 10/24/23  0928   PLATELETS 10*3/mm3 174     No results found for: \"COVID19\"  Lab Results   Component Value Date    HGBA1C 6.50 (H) 06/13/2023          Physical Findings        Physical Appearance alert     Edema  no edema   Gastrointestinal diarrhea   Tubes/Drains none   Oral/Mouth Cavity WNL   Skin intact       Estimated/Assessed Needs        Energy Requirements    Height for Calculation      Weight for Calculation 81 kg   Method for Estimation  30 kcal/kg   EST Needs (kcal/day) 2430 kcals/d       Protein Requirements    Weight for Calculation 81 kg   EST Protein Needs (g/kg) 1.2 gm/kg   EST Daily Needs (g/day) 97 g/d       Fluid Requirements     Method for Estimation 30 mL/kg    Estimated Needs (mL/day) 2430 ml/d           PES STATEMENT / NUTRITION DIAGNOSIS      Nutrition Dx Problem Problem:    NutritionDiagnosisProblem: Knowledge Deficit, Unintentional Weight Loss, Altered GI Function, Inadequate Oral Intake, and Inadequate Protein Intake    Etiology:  Medical diagnosis: Gastric cancer  Factors affecting nutrition: Reported/Observed By, Appetite, Food Habit/Preferences, Reported GI Symptoms      Signs/Symptoms:  Information Deficit, Report of Minimal PO Intake, Unintended Weight Change, and Report/Observation    Comment:      NUTRITION INTERVENTION / PLAN OF CARE      Intervention Goal(s) Reduce/improve " symptoms, Provide information, Meet estimated needs, Disease management/therapy, Tolerate PO , Increase intake, Maintain weight, and No significant weight loss         RD Intervention/Action Encouraged intake, Follow Tx progress, Recommended/ordered         Recommendations:       PO Diet Small frequent meals, high protein       Supplements Try CIB sugar free, or glucerna, Ensure max, between meals      Snacks       Other Gummy multivitamin for men, calcium citrate with vitamin D, imodium as directed   --      Monitor/Evaluation PO intake, Supplement intake, Pertinent labs, Weight, Symptoms   Education Education provided   --    RD to follow     Electronically signed by:  Altagracia Peter RD, LD  10/24/23 11:13 EDT

## 2023-10-26 ENCOUNTER — INFUSION (OUTPATIENT)
Dept: ONCOLOGY | Facility: HOSPITAL | Age: 73
End: 2023-10-26
Payer: MEDICARE

## 2023-10-26 DIAGNOSIS — Z45.2 FITTING AND ADJUSTMENT OF VASCULAR CATHETER: ICD-10-CM

## 2023-10-26 DIAGNOSIS — C16.9 GASTRIC ADENOCARCINOMA: Primary | ICD-10-CM

## 2023-10-26 PROCEDURE — 25010000002 HEPARIN LOCK FLUSH PER 10 UNITS: Performed by: INTERNAL MEDICINE

## 2023-10-26 RX ORDER — SODIUM CHLORIDE 0.9 % (FLUSH) 0.9 %
10 SYRINGE (ML) INJECTION AS NEEDED
Status: DISCONTINUED | OUTPATIENT
Start: 2023-10-26 | End: 2023-10-26 | Stop reason: HOSPADM

## 2023-10-26 RX ORDER — SODIUM CHLORIDE 0.9 % (FLUSH) 0.9 %
10 SYRINGE (ML) INJECTION AS NEEDED
OUTPATIENT
Start: 2023-10-26

## 2023-10-26 RX ORDER — HEPARIN SODIUM (PORCINE) LOCK FLUSH IV SOLN 100 UNIT/ML 100 UNIT/ML
500 SOLUTION INTRAVENOUS AS NEEDED
Status: DISCONTINUED | OUTPATIENT
Start: 2023-10-26 | End: 2023-10-26 | Stop reason: HOSPADM

## 2023-10-26 RX ORDER — HEPARIN SODIUM (PORCINE) LOCK FLUSH IV SOLN 100 UNIT/ML 100 UNIT/ML
500 SOLUTION INTRAVENOUS AS NEEDED
OUTPATIENT
Start: 2023-10-26

## 2023-10-26 RX ADMIN — Medication 500 UNITS: at 12:03

## 2023-10-26 RX ADMIN — Medication 10 ML: at 12:02

## 2023-10-30 ENCOUNTER — NUTRITION (OUTPATIENT)
Dept: OTHER | Facility: HOSPITAL | Age: 73
End: 2023-10-30
Payer: MEDICARE

## 2023-10-30 NOTE — PROGRESS NOTES
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  Overall, he is tolerated treatment quite well.  He states he did have 1 day that he forgot about the cold sensitivity, and ate a popsicle.  This resulted in some numbness/tingling of his mouth, which resolved once his mouth warmed back up.  He does have some chronic numbness in his left hand in his middle and ring finger, related to carpal tunnel.  He also has some mild chronic diarrhea which has not worsened.  He denies skin rash, and denies issues with nausea or vomiting.    We went on to proceed with cycle 3 FOLFOX and plans for the patient to be seen 2 weeks later.  In the meantime he has been seen by Dr. Varela anticipating surgical resection to require total gastrectomy, partial Jasiel Arnulfo esophagectomy and Steffany-en-Y reconstruction.  Currently the patient is scheduled for a PET 8/14/2023 and review by Dr. Varela 8/17/2023.    The patient is next seen 8/9/2023.  He is ready to proceed with his fourth cycle of therapy and subsequent PET/CT.  His case has been discussed with  as well as to his plans described above.    The patient proceeded to PET/CT 8/14/2023 to document resolution of focal hypermetabolism along the gastric lesser curvature, subcentimeter upper abdominal retroperitoneal lymphadenopathy was too small to characterize as were unchanged 5 to 6 mm right lower lobe solid pulmonary nodules.    The patient was admitted 9/13-9/18 undergoing Noble total gastrectomy with Steffany-en-Y, esophagojejunostomy and jejunal feeding tube placement.  Pathology revealed the stomach with a total gastrectomy and omentectomy-invasive moderately to poorly differentiated adenocarcinoma with treatment effect measuring 1 cm arising the background of  intestinal metaplasia, associated low and high-grade dysplasia, margins free of dysplasia or malignancy, intestinal metaplasia present at proximal and distal margins excision, 2 of 16 lymph nodes positive for metastatic adenocarcinoma the largest focus measuring 4 mm, benign lobulated adipose tissue consistent with omentum negative for tumor.  Final stage: [id number].    The patient was seen back by Dr. Mckenzie 9/28/2023 with his incision healing and staples removed, J-tube had not been used and was removed patient was doing well enough to be referred back to proceed with his next portion of therapy.    He is seen formally 10/16/2023.  Fortunately he is doing exceedingly well and, while he is working to manage his new diet, is confirmed with Dr. Mckenzie that we can proceed with her second phase of chemotherapy with 4 additional cycles of FOLFOX.    Patient returns today for evaluation prior to initiating his second phase of chemotherapy with cycle 5 FOLFOX.  Continues with alternating constipation and diarrhea.  Previously had 4 days of no bowel movement, but for the last week has now been having diarrhea, up to 8 episodes daily.  He has not taken any medications for his diarrhea.  Reports he is not eating much due to having diarrhea following eating.  Also reports he is not drinking a lot of water, maybe 20 ounces daily.  Continues with neuropathy in the bilateral feet, this remains stable.  Also admits he has been noncompliant with his blood pressure medications, reports he does not take these regularly.  He is again hypertensive in office today, fortunately he is asymptomatic.  Strongly encouraged him to resume his blood pressure medications.  Denies fever or chills.  Denies nausea or vomiting.  Denies new or worsening pain.    Patient is evaluated 10/31/2023 for toxicity check and possible IV fluids.  Diarrhea has greatly improved with the use of Imodium.  Currently taking 1 Imodium tablet every 3-4 days with good  control of his diarrhea.  Appetite has been good.  He does admit he needs to try and eat smaller portions, as he is experiencing bloating by overeating.  His blood pressure is again elevated today.  He does report being compliant with his blood pressure medicine since his last visit.  He has also been checking his blood pressure daily at home, reports it runs 150s over 90s at home.  Reports that his blood pressure typically runs higher at the doctor's office.  Denies any headache, vision changes, or chest pain.  He does plan to follow-up with his PCP concerning persistently elevated blood pressures despite being compliant with his blood pressure medicine over the last week.  Did advise him if he starts to have headache, chest pain, or vision changes or his blood pressure consistently runs higher at home that he may need to present to the ER for prompt evaluation of his elevated blood pressure.  Continues with neuropathy in the bilateral feet, stable.  Denies fever or chills.  Denies nausea or vomiting.  Denies new or worsening pain.    Past Medical History:   Diagnosis Date    Arthritis     BPH (benign prostatic hyperplasia)     Depression     Diabetes mellitus     Diarrhea     GERD (gastroesophageal reflux disease)     Gout     Hyperlipidemia     Hypertension     Neuropathy     bilat feet    Pulmonary arterial hypertension     Restless legs     Skin cancer, basal cell     Stomach cancer 2023    CHEMOTHERAPY      Hematologic/oncologic history:      The patient is a 72-year-old male followed by primary care with a history of hypertension, hyperlipidemia, type 2 diabetes, chronic venous stasis, osteoarthritis and gout.  He has been admitted 12/5- 7/2022 with joint pain that was polyarticular with associated edema.  This became quite marked and increasing 20 pounds over 7 days.  His initial studies included hemoglobin 11.4 hematocrit 34.7, sed rate of 39, negative anti-double-stranded DNA, Brambila antigen, RNP, SSA and  SSB, normal liver function tests, normal echocardiogram, normal ultrasound of kidneys.  The patient was placed on a 2 g sodium diet, starting of IV diuretics with adjustment of his amlodipine and ropinirole and he did lose approximately 15 pounds.  Studies in around this time included 8/29/2022 with limited abdominal ultrasound showed a small amount of echogenic sludge in the gallbladder, echogenicity liver indicative hepatic steatosis without mass and HIDA scan which was essentially normal.  Renal ultrasound suggested a potential focal cortical defect in the left kidney and follow-up CT abdomen pelvis 1/10/2023 demonstrated no renal calculi, hydronephrosis or suspicious renal mass, diffuse hepatic steatosis and colonic diverticulosis.    The patient later in March required right carpal tunnel release and had been seen by GI (Dr. Reilly) 3/9/2023 undergoing colonoscopy for surveillance with a history of colonic polyps (adenomatous).  After the procedure he did mention upper GI symptoms with nausea left upper abdominal pain and reflux and had previously had upper abdominal symptoms which resolved thought to be gastritis.  His recent radiologic studies did not explain his symptoms and plans were made for EGD through Dr. Reilly.    EGD was scheduled on 4/27/2023 demonstrating normal mucosa in the upper third of the esophagus the middle third and the lower third, Z-line regular at 40 cm, scattered mild mucosal changes characterized by granularity at the GE junction with biopsy, diffuse mucosal changes with atrophy in the gastric body with biopsies taken and localized severe mucosal change with ulceration found on the posterior wall of the gastric body.  The cardia and gastric fundus were normal retroflexion and no mucosal was found in the entire duodenum with additional biopsies taken.    Pathology results included random duodenal biopsies negative, random gastric biopsy with intestinal metaplasia, negative H.  pylori and random lower esophageal biopsy with reactive changes only but gastric ulcer biopsy was consistent with moderately to poorly differentiated adenocarcinoma arising in a background of intestinal metaplasia with high-grade dysplasia, gastric ulcer with necroinflammatory exudate present and subsequent immunostain for HER2 was negative.    The patient's subsequent imaging included CT chest abdomen pelvis showing circumferential thickening of the lesser curvature of the stomach with a central area of ulceration, prominent gastric hepatic ligament and lymph nodes concerning for metastatic disease but no evidence of distant metastatic disease.    The patient is now referred to oncology.  It is not clear that he has been seen by surgery as of yet.    The patient was to be seen in CBC office today but was unable to make the appointment as a result of accidents on the express[place] path for many miles and leading to him having to return to home.  He is contacted by telephone (agrees with the call) recognizing that he truly needs an assessment by PET/CT and possibly a surgical assessment now.  Fortunately his symptoms are relatively well controlled at present.      The patient was more appropriately directed to general surgery initially and seen 5/26/2023 with consideration that he undergo peritoneal washing to evaluate for malignant cells.  This occurred through Dr. Mckenzie 5/30/2023-EGD and laparoscopy.  Study revealed through a retroflexed view the fundus and ulcerated tumor located along the lesser curvature immediately distal to the GE junction, 4 cm distal to the GE junction.  Washings were negative for definitive for malignancy.    It was concluded that he would need an esophagogastrectomy and the case was discussed with  who favored upfront chemotherapy given neoadjuvantly.  The patient was seen 6/5/2023 and it was determined that endoscopic ultrasound be obtained and if this revealed T1 tumor  and he be a good candidate for initial surgery.  The patient is to be seen by Dr. Hodge concerning this.    The patient is seen on 6/13/2023 in office.  We have discussed, in detail with his son present, his current status including the need for EUS, subsequent port placement as we determine his tumor stage.  Available, currently, his PET/CT performed 5/3/2023 showing low-level activity in the short segment of the lesser curvature at the site of his gastric malignancy, otherwise negative with very low-level metabolic activity in the abdomen and shotty nodes in the axilla and both groin regions.    The patient continued his staging undergoing EGD and EUS 6/21/2023 demonstrated gastric ulceration/mass in the mid to proximal portion lesser curvature of the stomach measuring 5-7 cm, EUS with gastric mass and invasion of muscularis propria with 1 area penetrating through the muscularis propria into the adventitia-likely T2 lesion, 3 small lymph nodes celiac axis/gastrohepatic region not overtly hyperechoic largest measuring 6 mm, normal pancreatic EUS and fatty liver with no metastatic lesions.  FNB x2 performed the largest lymph node negative cytologically.  He had seen  6/21/2023 with plans to approach a T2 lesion or higher with chemotherapy neoadjuvant adjuvant settings-likely to require total gastrectomy, partial Jasiel Arnulfo esophagectomy and Steffany-en-Y reconstruction.  The patient underwent Mediport placement 6/23/2023 and is seen back in office 6/27/2023 to proceed with chemotherapy-FOLFOX.    Patient returned to the the office  on 7/5/2023 for toxicity check and possible IV fluids following completion of his first cycle of FOLFOX on 6/27/2023.  Patient reports he is doing well and he denies any nausea, vomiting, or worsening neuropathy.  Patient did have some mild constipation initially following treatment that resolved on its own.  He continues to eat and drink well.  He has been able to still play golf since  completing his first treatment.  He does not feel that he needs any IV fluids today.  No other concerns noted at this time.    He is next seen 7/11/2023 for his second cycle of FOLFOX.  Unfortunately his access needle was not removed after his last visit and thus his port has been accessed for a week.  He is not having pain or significant tenderness in the area nor fever nor chills.  His port is now been deaccessed, clean, reaccessed and we have discussed how to proceed.    We went on to proceed with cycle 3 FOLFOX and plans for the patient to be seen 2 weeks later.  In the meantime he has been seen by Dr. Varela anticipating surgical resection to require total gastrectomy, partial Jasiel Arnulfo esophagectomy and Steffany-en-Y reconstruction.  Currently the patient is scheduled for a PET 8/14/2023 and review by Dr. Varela 8/17/2023.    The patient is next seen 8/9/2023.  He is ready to proceed with his fourth cycle of therapy and subsequent PET/CT.  His case has been discussed with  as well as to his plans described above.    The patient proceeded to PET/CT 8/14/2023 to document resolution of focal hypermetabolism along the gastric lesser curvature, subcentimeter upper abdominal retroperitoneal lymphadenopathy was too small to characterize as were unchanged 5 to 6 mm right lower lobe solid pulmonary nodules.    The patient was admitted 9/13-9/18 undergoing Noble total gastrectomy with Steffany-en-Y, esophagojejunostomy and jejunal feeding tube placement.  Pathology revealed the stomach with a total gastrectomy and omentectomy-invasive moderately to poorly differentiated adenocarcinoma with treatment effect measuring 1 cm arising the background of intestinal metaplasia, associated low and high-grade dysplasia, margins free of dysplasia or malignancy, intestinal metaplasia present at proximal and distal margins excision, 2 of 16 lymph nodes positive for metastatic adenocarcinoma the largest focus measuring 4 mm, benign lobulated  adipose tissue consistent with omentum negative for tumor.  Final stage: [id number].    The patient was seen back by Dr. Mckenzie 9/28/2023 with his incision healing and staples removed, J-tube had not been used and was removed patient was doing well enough to be referred back to proceed with his next portion of therapy.    He is seen formally 10/16/2023.  Fortunately he is doing exceedingly well and, while he is working to manage his new diet, is confirmed with Dr. Mckenzie that we can proceed with her second phase of chemotherapy with 4 additional cycles of FOLFOX.  Past Surgical History:   Procedure Laterality Date    CARPAL TUNNEL RELEASE Left 01/19/2023    Procedure: LEFT CARPAL TUNNEL RELEASE AND SYNOVIAL BIOPSY;  Surgeon: Mikel Dupont MD;  Location: [facility] MA[facility];  Service: Hand;  Laterality: Left;    CARPAL TUNNEL RELEASE Right 03/02/2023    Procedure: RIGHT CARPAL TUNNEL RELEASE;  Surgeon: Mikel Dupont MD;  Location: [facility];  Service: Hand;  Laterality: Right;    CATARACT EXTRACTION WITH INTRAOCULAR LENS IMPLANT Bilateral     COLONOSCOPY N/A 03/09/2023    DIAGNOSTIC LAPAROSCOPY N/A 05/30/2023    Procedure: DIAGNOSTIC LAPAROSCOPY with peritoneal washing;  Surgeon: Tito Mckenzie MD;  Location: [facility] MAIN OR;  Service: General;  Laterality: N/A;    ENDOSCOPY N/A 04/27/2023    Dr. Reilly    ENDOSCOPY N/A 05/30/2023    Procedure: ESOPHAGOGASTRODUODENOSCOPY;  Surgeon: Tito Mckenzie MD;  Location: [facility] MAIN OR;  Service: General;  Laterality: N/A;    ESOPHAGOGASTRECTOMY N/A 9/13/2023    Procedure: Esophagogastroduodenoscopy;  Surgeon: Berry Varela MD;  Location: [facility] MAIN OR;  Service: Thoracic;  Laterality: N/A;    GASTRECTOMY N/A 9/13/2023    Procedure: GASTRECTOMY WITH FEEDING JEJUNOSTOMY PLACEMENT;  Surgeon: Tito Mckenzie MD;  Location: [facility] MAIN OR;  Service: General;  Laterality: N/A;    KNEE ARTHROSCOPY Bilateral     TOTAL KNEE ARTHROPLASTY Right 02/21/2018     UPPER ENDOSCOPIC ULTRASOUND W/ FNA N/A 06/21/2023    Procedure: ENDOSCOPIC ULTRASOUND WITH STAGING AND FINE NEEDLE ASPIRATION;  Surgeon: Kavon Hodge MD;  Location: [facility] ENDOSCOPY;  Service: Gastroenterology;  Laterality: N/A;  Post:    VENOUS ACCESS DEVICE (PORT) INSERTION N/A 06/23/2023    Procedure: INSERTION VENOUS ACCESS DEVICE;  Surgeon: Berry Varela MD;  Location: [facility] MAIN OR;  Service: Thoracic;  Laterality: N/A;        Current Outpatient Medications on File Prior to Visit   Medication Sig Dispense Refill    allopurinol (ZYLOPRIM) 300 MG tablet Take 1 tablet by mouth Daily.      DULoxetine (CYMBALTA) 60 MG capsule Take 1 capsule by mouth Daily.      metoprolol succinate XL (TOPROL-XL) 50 MG 24 hr tablet Take 1 tablet by mouth Daily. 30 tablet 3    potassium chloride (K-DUR,KLOR-CON) 20 MEQ CR tablet Take 1 tablet by mouth 2 (Two) Times a Day. 60 tablet 5    rOPINIRole (REQUIP) 2 MG tablet Take 1 tablet by mouth Every 12 (Twelve) Hours. (Patient taking differently: Take 1 tablet by mouth Every Night.) 60 tablet 3    spironolactone (ALDACTONE) 25 MG tablet Take 1 tablet by mouth Daily.      tamsulosin (FLOMAX) 0.4 MG capsule 24 hr capsule Take 1 capsule by mouth Every Evening.      Unable to find Take 1 each by mouth Every Night. PURE HIMALAYAN SHILAJIT  1 TABLET UNDER TONGUE IN THE EVENINGS  (FOR RESTLESS LEGS)      valsartan (DIOVAN) 160 MG tablet Take 1 tablet by mouth Daily. (Patient taking differently: Take 1 tablet by mouth Daily.) 90 tablet 3     No current facility-administered medications on file prior to visit.        ALLERGIES:  No Known Allergies     Social History     Socioeconomic History    Marital status:    Tobacco Use    Smoking status: Never     Passive exposure: Never    Smokeless tobacco: Never   Vaping Use    Vaping Use: Never used   Substance and Sexual Activity    Alcohol use: Yes     Comment: once a month  one  Samantha/with Mexican Dinner    Drug use: Yes     Types:  Marijuana     Comment: thc gummies for sleep    Sexual activity: Not Currently     Partners: Female     Birth control/protection: Vasectomy        Family History   Problem Relation Age of Onset    Malig Hyperthermia Neg Hx         Review of Systems   Constitutional:  Positive for activity change (Unchanged from his baseline) and fatigue (Unchanged from his baseline).   HENT: Negative.     Eyes: Negative.    Respiratory: Negative.     Cardiovascular: Negative.    Gastrointestinal:  Positive for abdominal pain (Unchanged from his baseline).   Genitourinary: Negative.    Musculoskeletal:  Positive for arthralgias (Unchanged from his baseline).   Skin: Negative.    Allergic/Immunologic: Negative.    Neurological:  Positive for numbness (Patient describes 30 years of peripheral neuropathy-severe-this has not worsened with chemotherapy thus far.).   Psychiatric/Behavioral: Negative.          Objective     There were no vitals filed for this visit.            10/17/2023    11:12 AM   Current Status   ECOG score 0       Physical Exam  Vitals and nursing note reviewed.   Constitutional:       Appearance: Normal appearance. He is well-developed.   HENT:      Head: Normocephalic and atraumatic.      Nose: Nose normal.   Eyes:      Pupils: Pupils are equal, round, and reactive to light.   Cardiovascular:      Rate and Rhythm: Normal rate and regular rhythm.      Heart sounds: Normal heart sounds.   Pulmonary:      Effort: Pulmonary effort is normal. No respiratory distress.      Breath sounds: Normal breath sounds. No wheezing, rhonchi or rales.   Abdominal:      General: Bowel sounds are normal. There is no distension.      Palpations: Abdomen is soft.      Tenderness: There is no abdominal tenderness.   Musculoskeletal:         General: Normal range of motion.      Cervical back: Normal range of motion and neck supple.   Skin:     General: Skin is warm and dry.   Neurological:      Mental Status: He is alert and oriented to  person, place, and time.   Psychiatric:         Behavior: Behavior normal.           RECENT LABS:  Hematology WBC   Date Value Ref Range Status   10/24/2023 6.91 3.40 - 10.80 10*3/mm3 Final   05/26/2021 7.73 4.5 - 11.0 10*3/uL Final     RBC   Date Value Ref Range Status   10/24/2023 4.27 4.14 - 5.80 10*6/mm3 Final   05/26/2021 4.94 4.5 - 5.9 10*6/uL Final     Hemoglobin   Date Value Ref Range Status   10/24/2023 12.6 (L) 13.0 - 17.7 g/dL Final   03/09/2023 14.4 13.7 - 17.5 Gram/dL Final   05/26/2021 15.9 13.5 - 17.5 g/dL Final     Hematocrit   Date Value Ref Range Status   10/24/2023 39.1 37.5 - 51.0 % Final   03/09/2023 41.2 40.1 - 51.0 % Final     Platelets   Date Value Ref Range Status   10/24/2023 174 140 - 450 10*3/mm3 Final   05/26/2021 208 140 - 440 10*3/uL Final          Assessment & Plan     *Gastric Adenocarcinoma  73-year-old male followed by primary care with hypertension, hyperlipidemia, type 2 diabetes, chronic venous stasis osteoarthritis and gout.  He had developed polyarticular joint pain in December and required hospitalization with diet alteration and IV diuretics.  He did improve fortunately but began to have abdominal discomfort thereafter leading to assessment of gallbladder, renal ultrasound with a potential cortical defect left kidney and a follow-up CT scan of abdomen pelvis that revealed hepatic steatosis and colonic diverticulosis.  The patient was seen by GI for screening colonoscopy and surveillance of adenomatous colonic polyps.  At this point he is having upper abdominal symptoms thought to be gastritis though EGD was performed 4/27/2023 ultimately revealing an ulceration found on the posterior wall of the gastric body.  Biopsy was positive for poorly differentiated adenocarcinoma arising in a background of intestinal metaplasia and high-grade dysplasia, immunostain HER2 negative.  The patient's subsequent CT scan of chest and pelvis showed circumferential thickening of the lesser  curvature of the stomach with a central area of ulceration and prominent gastrohepatic ligament lymphadenopathy concerning metastatic disease but no clear evidence of distant metastatic disease.  The patient is contacted by telephone 5/18/2023 and we plan to proceed with PET/CT next available, surgical assessment and follow-up in in CBC office subsequently.  The patient was more appropriately directed to general surgery initially and seen 5/26/2023 with consideration that he undergo peritoneal washing to evaluate for malignant cells.  This occurred through Dr. Mckenzie 5/30/2023-EGD and laparoscopy.  Study revealed through a retroflexed view the fundus and ulcerated tumor located along the lesser curvature immediately distal to the GE junction, 4 cm distal to the GE junction.  Washings were negative for definitive for malignancy.  It was concluded that he would need an esophagogastrectomy and the case was discussed with  who favored upfront chemotherapy given neoadjuvantly.  The patient was seen 6/5/2023 and it was determined that endoscopic ultrasound be obtained and if this revealed T1 tumor and he be a good candidate for initial surgery.  The patient is to be seen by Dr. Hodge concerning this.  The patient is seen on 6/13/2023 in office.  We have discussed, in detail with his son present, his current status including the need for EUS, subsequent port placement as we determine his tumor stage.  Available, currently, his PET/CT performed 5/3/2023 showing low-level activity in the short segment of the lesser curvature at the site of his gastric malignancy, otherwise negative with very low-level metabolic activity in the abdomen and shotty nodes in the axilla and both groin regions.  The patient was now scheduled for EUS next week with results pending.  Pending the T stage he could well be a candidate for neoadjuvant chemotherapy but we find now that his peripheral neuropathy is both severe and longstanding and  thus the use of certain chemotherapy regimens such as FLOT (frequently used) is of concern since peripheral neuropathy risk could be quite high.  Alternative FOLFOX could be utilized with oxaliplatin dose adjusted pending his degree of neuropathic symptoms.  He returned to laboratory undergoing exams including normal B12, ferritin, iron profile 10% saturation, CEA 4.56, hemoglobin A1c of 6.50.  Additionally underwent teaching for FOLFOX chemotherapy.  Dr. Varela has contacted me indicating that EUS is scheduled 6/21/2023 and he will schedule PowerPort 6/23/2023.  The patient proceeded to PET/CT performed 5/3/2023 showing low-level activity in the short segment of the lesser curvature at the site of his gastric malignancy, otherwise negative with very low-level metabolic activity in the abdomen and shotty nodes in the axilla and both groin regions.  The patient continued his staging undergoing EGD and EUS 6/21/2023 demonstrated gastric ulceration/mass in the mid to proximal portion lesser curvature of the stomach measuring 5-7 cm, EUS with gastric mass and invasion of muscularis propria with 1 area penetrating through the muscularis propria into the adventitia-likely T2 lesion, 3 small lymph nodes celiac axis/gastrohepatic region not overtly hyperechoic largest measuring 6 mm, normal pancreatic EUS and fatty liver with no metastatic lesions.  FNB x2 performed the largest lymph node negative cytologically.  He had seen  6/21/2023 with plans to approach a T2 lesion or higher with chemotherapy neoadjuvant adjuvant settings-likely to require total gastrectomy, partial Jasiel Arnulfo esophagectomy and Steffany-en-Y reconstruction.  Seen back in office 6/27/2023 to proceed with chemotherapy-FOLFOX.  We discussed that he would be offered 4 cycles preoperative and 4 cycles postoperative pending tolerance.  His findings and course will be discussed with his surgeons as we proceed.  Patient reviewed back today following first cycle  of FOLFOX.  He reports he is feeling well and has remained asymptomatic.  He has not noted any increased neuropathy from his baseline.  He denies any nausea, vomiting, increased arthralgias, cold sensation, or diarrhea.  He did have some mild constipation initially that resolved on its own.  He has remained active and continues to eat and drink well.  He does not feel that he needs any IV fluids today.  The patient is seen 7/11/2023 for his second cycle of FOLFOX chemotherapy.  7/25/2023 cycle 3 neoadjuvant FOLFOX (out of 4 preoperative cycles planned).  Tolerating well.  We went on to proceed with cycle 3 FOLFOX and plans for the patient to be seen 2 weeks later.  In the meantime he has been seen by Dr. Varela anticipating surgical resection to require total gastrectomy, partial [place] Arnulfo esophagectomy and Steffany-en-Y reconstruction.  Currently the patient is scheduled for a PET 8/14/2023 and review by Dr. Varela 8/17/2023.  The patient is next seen 8/9/2023.  He is ready to proceed with his fourth cycle of therapy and subsequent PET/CT.  His case has been discussed with  as well as to his plans described above.  The patient proceeded to PET/CT 8/14/2023 to document resolution of focal hypermetabolism along the gastric lesser curvature, subcentimeter upper abdominal retroperitoneal lymphadenopathy was too small to characterize as were unchanged 5 to 6 mm right lower lobe solid pulmonary nodules.  The patient was admitted 9/13-9/18 undergoing Noble total gastrectomy with Steffany-en-Y, esophagojejunostomy and jejunal feeding tube placement.  Pathology revealed the stomach with a total gastrectomy and omentectomy-invasive moderately to poorly differentiated adenocarcinoma with treatment effect measuring 1 cm arising the background of intestinal metaplasia, associated low and high-grade dysplasia, margins free of dysplasia or malignancy, intestinal metaplasia present at proximal and distal margins excision, 2 of 16 lymph  nodes positive for metastatic adenocarcinoma the largest focus measuring 4 mm, benign lobulated adipose tissue consistent with omentum negative for tumor.  Final stage: [id number].  The patient was seen back by Dr. Mckenzie 9/28/2023 with his incision healing and staples removed, J-tube had not been used and was removed patient was doing well enough to be referred back to proceed with his next portion of therapy-4 cycles of FOLFOX.  Patient seen 10/17/2023.  He is seen formally 10/16/2023.  Fortunately he is doing exceedingly well and, while he is working to manage his new diet, is confirmed with Dr. Mckenzie that we can proceed with her second phase of chemotherapy with 4 additional cycles of FOLFOX.  10/24/2023: Cycle 5 FOLFOX.  Was previously struggling with constipation, for the last week has had diarrhea up to 8 episodes daily.  Has not taken any antidiarrheals, reports he was unsure if he could take these following surgery.  Weight is down approximately 10 pounds since his last visit.  Admits he is not eating much, as he typically has diarrhea after eating.  Also admits he is not drinking a lot of fluid, about 20 ounces daily.  He is scheduled to see [facility] dietitian following our visit today who plans to review dietary recommendations.  Recommended he start Imodium as needed for diarrhea.  If no improvement with Imodium plan to start Lomotil.  We will proceed with 500 cc normal saline prior to chemotherapy.   Magnesium checked today and normal at 1.9.  10/31/2023: Diarrhea is significantly improved with Imodium use.  Typically taking 1 Imodium tablet every 3-4 days.  Feels his fluid intake has been doing good.  Proceed with 500 cc normal saline today while we await CMP results.  Creatinine normal, patient will not receive any further fluids.    *Venous access  patient underwent Mediport placement 6/23/2023   Patient's port evaluated 7/10/2023 having been accessed for week after last visit.  Fortunately the area  in question is not significantly inflamed nor fluctuant.  We have deaccessed, cleaned the site, reaccessed and plan to proceed with blood cultures pending.  Size reevaluated 8/8/2023 without additional information    *Hypertension  Patient admits he has been noncompliant with his medications, does not take them regularly.    Blood pressure 10/26/2023 185/102.  He is fortunately asymptomatic.  Strongly encouraged him to resume blood pressure medications and close monitoring of blood pressure at home, reports he is agreeable with this.   10/31/2023: Blood pressure 176/102.  Has been compliant with his blood pressure medication over the last week.  Asymptomatic.  Checking his blood pressure at home and reports it typically runs 150s over 90s.  He plans to follow-up with his PCP in regards to blood pressure management.  Did advise if he becomes symptomatic he should present to the ER.    *Hypokalemia  Potassium 3.1.    Patient has not been taking his oral potassium, encouraged him to resume potassium 20 mill equivalent tablets 1 tablet twice daily.     PLAN:   500 cc normal saline today.  Continue potassium 20 mill equivalent tablets 1 tablet twice daily.  Continue follow-up with oncology dietitian.  Continue Imodium as needed for diarrhea  Continue blood pressure medication and monitoring blood pressure closely at home.  We will follow-up with PCP in regards to hypertension control.  Return in 1 week for MD follow up and cycle 6 FOLFOX.  Plans are to proceed with 4 total additional treatments    Patient is on a high risk medication requiring close monitoring for toxicity.    BHARAT Jones  10/30/23

## 2023-10-30 NOTE — PROGRESS NOTES
OUTPATIENT ONCOLOGY NUTRITION ASSESSMENT    Patient Name: Mark Ken  YOB: 1950  MRN: [id number]  Assessment Date: 10/30/2023    COMMENTS: Follow up by phone today.  The patient reports that he purchased some different food items and has been eating them. Diarrhea is improved with the  imodium. Still has some bloating and discomfort after eating which he attributes to eating too much. He will continue to work on eating less at meals but more frequently during the day. Will continue to follow up.           Reason for Assessment Follow up, Education      Diagnosis/Problem   Gastric cancer   Treatment Plan ChemotherapyFOLFOX   Frequency Every two weeks   Goal of cancer treatment Disease control   Comments:      MST = 2 more Patient at risk for malnutrition     Encounter Information        Nutrition/Diet History:  Patient reports feeling bloated after he eats   Oral Nutrition Supplements:    Factors/Symptoms Affecting Intake: Diarrhea, Early satiety diarrhea better with imodium    Comments:      Medical/Surgical History Past Medical History:   Diagnosis Date    Arthritis     BPH (benign prostatic hyperplasia)     Depression     Diabetes mellitus     Diarrhea     GERD (gastroesophageal reflux disease)     Gout     Hyperlipidemia     Hypertension     Neuropathy     bilat feet    Pulmonary arterial hypertension     Restless legs     Skin cancer, basal cell     Stomach cancer 2023    CHEMOTHERAPY       Past Surgical History:   Procedure Laterality Date    CARPAL TUNNEL RELEASE Left 01/19/2023    Procedure: LEFT CARPAL TUNNEL RELEASE AND SYNOVIAL BIOPSY;  Surgeon: Mikel Dupont MD;  [place] [facility] [place];  Service: Hand;  Laterality: Left;    CARPAL TUNNEL RELEASE Right 03/02/2023    Procedure: RIGHT CARPAL TUNNEL RELEASE;  Surgeon: Mikel Dupont MD;  [place] [facility] [place];  Service: Hand;  Laterality: Right;    CATARACT EXTRACTION WITH INTRAOCULAR LENS IMPLANT Bilateral      "COLONOSCOPY N/A 03/09/2023    DIAGNOSTIC LAPAROSCOPY N/A 05/30/2023    Procedure: DIAGNOSTIC LAPAROSCOPY with peritoneal washing;  Surgeon: Tito Mckenzie MD;  Location: [facility] MAIN OR;  Service: General;  Laterality: N/A;    ENDOSCOPY N/A 04/27/2023    Dr. Reilly    ENDOSCOPY N/A 05/30/2023    Procedure: ESOPHAGOGASTRODUODENOSCOPY;  Surgeon: Tito Mckenzie MD;  Location: [facility] MAIN OR;  Service: General;  Laterality: N/A;    ESOPHAGOGASTRECTOMY N/A 9/13/2023    Procedure: Esophagogastroduodenoscopy;  Surgeon: Berry Varela MD;  Location: [facility] MAIN OR;  Service: Thoracic;  Laterality: N/A;    GASTRECTOMY N/A 9/13/2023    Procedure: GASTRECTOMY WITH FEEDING JEJUNOSTOMY PLACEMENT;  Surgeon: Tito Mckenzie MD;  Location: [facility] MAIN OR;  Service: General;  Laterality: N/A;    KNEE ARTHROSCOPY Bilateral     TOTAL KNEE ARTHROPLASTY Right 02/21/2018    UPPER ENDOSCOPIC ULTRASOUND W/ FNA N/A 06/21/2023    Procedure: ENDOSCOPIC ULTRASOUND WITH STAGING AND FINE NEEDLE ASPIRATION;  Surgeon: Kavon Hodge MD;  Location: [facility] ENDOSCOPY;  Service: Gastroenterology;  Laterality: N/A;  Post:    VENOUS ACCESS DEVICE (PORT) INSERTION N/A 06/23/2023    Procedure: INSERTION VENOUS ACCESS DEVICE;  Surgeon: Berry Varela MD;  Location: [facility] MAIN OR;  Service: Thoracic;  Laterality: N/A;            Anthropometrics        Current Height Ht Readings from Last 1 Encounters:   10/24/23 172 cm (67.72\")      Current Weight Wt Readings from Last 1 Encounters:   10/24/23 81.1 kg (178 lb 11.2 oz)      Weight History Wt Readings from Last 30 Encounters:   10/24/23 0931 81.1 kg (178 lb 11.2 oz)   10/17/23 1111 83.6 kg (184 lb 4.8 oz)   09/27/23 1421 84.7 kg (186 lb 12.8 oz)   09/13/23 0710 93 kg (205 lb 0.4 oz)   09/06/23 0731 93.5 kg (206 lb 1.6 oz)   08/23/23 1659 90.4 kg (199 lb 6.4 oz)   08/17/23 0823 88.5 kg (195 lb)   08/08/23 0831 91.4 kg (201 lb 9.6 oz)   08/03/23 0847 90.7 kg (200 lb)   07/25/23 0800 91.5 kg (201 lb " "12.8 oz)   07/11/23 0853 92.4 kg (203 lb 12.8 oz)   07/05/23 1104 92.5 kg (203 lb 14.4 oz)   06/29/23 0906 95 kg (209 lb 6.4 oz)   06/27/23 0745 94 kg (207 lb 3.2 oz)   06/21/23 1213 91.6 kg (202 lb)   06/15/23 1240 93.4 kg (206 lb)   06/20/23 1130 91.7 kg (202 lb 1.6 oz)   06/16/23 1409 93 kg (205 lb 1.6 oz)   06/13/23 1327 93.5 kg (206 lb 1.6 oz)   06/05/23 1319 92.5 kg (204 lb)   05/26/23 1430 95.2 kg (209 lb 12.8 oz)   03/02/23 0635 94.3 kg (208 lb)   02/27/23 1416 95.3 kg (210 lb)   01/19/23 0817 94.3 kg (207 lb 12.8 oz)   01/16/23 1433 95.3 kg (210 lb)   12/06/22 1033 104 kg (229 lb)   12/06/22 0839 104 kg (229 lb 8 oz)   12/06/22 0800 104 kg (229 lb 8 oz)   04/26/22 1521 93 kg (205 lb)          Medications           Current medications: DULoxetine, Unable to find, allopurinol, metoprolol succinate XL, potassium chloride, rOPINIRole, spironolactone, tamsulosin, and valsartan                 Tests/Procedures        Tests/Procedures Chemotherapy     Labs       Pertinent Labs    Results from last 7 days   Lab Units 10/24/23  0928   SODIUM mmol/L 145   POTASSIUM mmol/L 3.1*   CHLORIDE mmol/L 108*   CO2 mmol/L 26.4   BUN mg/dL 8   CREATININE mg/dL 0.85   CALCIUM mg/dL 9.4   BILIRUBIN mg/dL 0.6   ALK PHOS U/L 100   ALT (SGPT) U/L 20   AST (SGOT) U/L 22   GLUCOSE mg/dL 107*     Results from last 7 days   Lab Units 10/24/23  0928   MAGNESIUM mg/dL 1.9   HEMOGLOBIN g/dL 12.6*   HEMATOCRIT % 39.1   WBC 10*3/mm3 6.91   ALBUMIN g/dL 4.2     Results from last 7 days   Lab Units 10/24/23  0928   PLATELETS 10*3/mm3 174     No results found for: \"COVID19\"  Lab Results   Component Value Date    HGBA1C 6.50 (H) 06/13/2023          PES STATEMENT / NUTRITION DIAGNOSIS        Nutrition Dx Problem Problem:    NutritionDiagnosisProblem: Knowledge Deficit, Unintentional Weight Loss, Altered GI Function, Inadequate Oral Intake, and Inadequate Protein Intake     Etiology:  Medical diagnosis: Gastric cancer  Factors affecting nutrition: " Reported/Observed By, Appetite, Food Habit/Preferences, Reported GI Symptoms        Signs/Symptoms:  Information Deficit, Report of Minimal PO Intake, Unintended Weight Change, and Report/Observation     Comment:       NUTRITION INTERVENTION / PLAN OF CARE        Intervention Goal(s) Reduce/improve symptoms, Provide information, Meet estimated needs, Disease management/therapy, Tolerate PO , Increase intake, Maintain weight, and No significant weight loss            RD Intervention/Action Encouraged intake, Follow Tx progress, Recommended/ordered            Recommendations:        PO Diet Small frequent meals, high protein       Supplements Try CIB sugar free, or glucerna, Ensure max, between meals      Snacks        Other Gummy multivitamin for men, calcium citrate with vitamin D, imodium as directed   --        Monitor/Evaluation PO intake, Supplement intake, Pertinent labs, Weight, Symptoms   Education Education provided   --         RD to follow     Electronically signed by:  Altagracia Peter RD, LD  10/30/23 11:20 EDT

## 2023-10-31 ENCOUNTER — INFUSION (OUTPATIENT)
Dept: ONCOLOGY | Facility: HOSPITAL | Age: 73
End: 2023-10-31
Payer: MEDICARE

## 2023-10-31 ENCOUNTER — OFFICE VISIT (OUTPATIENT)
Dept: ONCOLOGY | Facility: CLINIC | Age: 73
End: 2023-10-31
Payer: MEDICARE

## 2023-10-31 VITALS
SYSTOLIC BLOOD PRESSURE: 176 MMHG | TEMPERATURE: 98 F | RESPIRATION RATE: 16 BRPM | OXYGEN SATURATION: 100 % | WEIGHT: 179.6 LBS | DIASTOLIC BLOOD PRESSURE: 102 MMHG | BODY MASS INDEX: 27.22 KG/M2 | HEART RATE: 73 BPM | HEIGHT: 68 IN

## 2023-10-31 DIAGNOSIS — Z79.899 HIGH RISK MEDICATION USE: ICD-10-CM

## 2023-10-31 DIAGNOSIS — C16.9 GASTRIC ADENOCARCINOMA: ICD-10-CM

## 2023-10-31 DIAGNOSIS — Z45.2 FITTING AND ADJUSTMENT OF VASCULAR CATHETER: Primary | ICD-10-CM

## 2023-10-31 DIAGNOSIS — R63.4 UNINTENTIONAL WEIGHT LOSS: ICD-10-CM

## 2023-10-31 DIAGNOSIS — C16.9 GASTRIC ADENOCARCINOMA: Primary | ICD-10-CM

## 2023-10-31 LAB
ALBUMIN SERPL-MCNC: 4.1 G/DL (ref 3.5–5.2)
ALBUMIN/GLOB SERPL: 1.7 G/DL
ALP SERPL-CCNC: 87 U/L (ref 39–117)
ALT SERPL W P-5'-P-CCNC: 21 U/L (ref 1–41)
ANION GAP SERPL CALCULATED.3IONS-SCNC: 10.7 MMOL/L (ref 5–15)
AST SERPL-CCNC: 22 U/L (ref 1–40)
BASOPHILS # BLD AUTO: 0.02 10*3/MM3 (ref 0–0.2)
BASOPHILS NFR BLD AUTO: 0.3 % (ref 0–1.5)
BILIRUB SERPL-MCNC: 0.3 MG/DL (ref 0–1.2)
BUN SERPL-MCNC: 15 MG/DL (ref 8–23)
BUN/CREAT SERPL: 18.8 (ref 7–25)
CALCIUM SPEC-SCNC: 9.1 MG/DL (ref 8.6–10.5)
CHLORIDE SERPL-SCNC: 106 MMOL/L (ref 98–107)
CO2 SERPL-SCNC: 24.3 MMOL/L (ref 22–29)
CREAT SERPL-MCNC: 0.8 MG/DL (ref 0.76–1.27)
DEPRECATED RDW RBC AUTO: 49.4 FL (ref 37–54)
EGFRCR SERPLBLD CKD-EPI 2021: 94 ML/MIN/1.73
EOSINOPHIL # BLD AUTO: 0.35 10*3/MM3 (ref 0–0.4)
EOSINOPHIL NFR BLD AUTO: 4.5 % (ref 0.3–6.2)
ERYTHROCYTE [DISTWIDTH] IN BLOOD BY AUTOMATED COUNT: 14.8 % (ref 12.3–15.4)
GLOBULIN UR ELPH-MCNC: 2.4 GM/DL
GLUCOSE SERPL-MCNC: 61 MG/DL (ref 65–99)
HCT VFR BLD AUTO: 38.9 % (ref 37.5–51)
HGB BLD-MCNC: 12.7 G/DL (ref 13–17.7)
IMM GRANULOCYTES # BLD AUTO: 0.02 10*3/MM3 (ref 0–0.05)
IMM GRANULOCYTES NFR BLD AUTO: 0.3 % (ref 0–0.5)
LYMPHOCYTES # BLD AUTO: 2.1 10*3/MM3 (ref 0.7–3.1)
LYMPHOCYTES NFR BLD AUTO: 26.9 % (ref 19.6–45.3)
MCH RBC QN AUTO: 29.7 PG (ref 26.6–33)
MCHC RBC AUTO-ENTMCNC: 32.6 G/DL (ref 31.5–35.7)
MCV RBC AUTO: 90.9 FL (ref 79–97)
MONOCYTES # BLD AUTO: 0.68 10*3/MM3 (ref 0.1–0.9)
MONOCYTES NFR BLD AUTO: 8.7 % (ref 5–12)
NEUTROPHILS NFR BLD AUTO: 4.63 10*3/MM3 (ref 1.7–7)
NEUTROPHILS NFR BLD AUTO: 59.3 % (ref 42.7–76)
NRBC BLD AUTO-RTO: 0 /100 WBC (ref 0–0.2)
PLATELET # BLD AUTO: 169 10*3/MM3 (ref 140–450)
PMV BLD AUTO: 10.9 FL (ref 6–12)
POTASSIUM SERPL-SCNC: 3.5 MMOL/L (ref 3.5–5.2)
PROT SERPL-MCNC: 6.5 G/DL (ref 6–8.5)
RBC # BLD AUTO: 4.28 10*6/MM3 (ref 4.14–5.8)
SODIUM SERPL-SCNC: 141 MMOL/L (ref 136–145)
WBC NRBC COR # BLD: 7.8 10*3/MM3 (ref 3.4–10.8)

## 2023-10-31 PROCEDURE — 25010000002 HEPARIN LOCK FLUSH PER 10 UNITS: Performed by: INTERNAL MEDICINE

## 2023-10-31 PROCEDURE — 25810000003 SODIUM CHLORIDE 0.9 % SOLUTION: Performed by: NURSE PRACTITIONER

## 2023-10-31 PROCEDURE — 96360 HYDRATION IV INFUSION INIT: CPT

## 2023-10-31 PROCEDURE — 85025 COMPLETE CBC W/AUTO DIFF WBC: CPT | Performed by: NURSE PRACTITIONER

## 2023-10-31 PROCEDURE — 80053 COMPREHEN METABOLIC PANEL: CPT | Performed by: NURSE PRACTITIONER

## 2023-10-31 RX ORDER — SODIUM CHLORIDE 9 MG/ML
500 INJECTION, SOLUTION INTRAVENOUS ONCE
Status: COMPLETED | OUTPATIENT
Start: 2023-10-31 | End: 2023-10-31

## 2023-10-31 RX ORDER — HEPARIN SODIUM (PORCINE) LOCK FLUSH IV SOLN 100 UNIT/ML 100 UNIT/ML
500 SOLUTION INTRAVENOUS AS NEEDED
Status: DISCONTINUED | OUTPATIENT
Start: 2023-10-31 | End: 2023-10-31 | Stop reason: HOSPADM

## 2023-10-31 RX ORDER — HEPARIN SODIUM (PORCINE) LOCK FLUSH IV SOLN 100 UNIT/ML 100 UNIT/ML
500 SOLUTION INTRAVENOUS AS NEEDED
OUTPATIENT
Start: 2023-10-31

## 2023-10-31 RX ORDER — SODIUM CHLORIDE 0.9 % (FLUSH) 0.9 %
10 SYRINGE (ML) INJECTION AS NEEDED
Status: DISCONTINUED | OUTPATIENT
Start: 2023-10-31 | End: 2023-10-31 | Stop reason: HOSPADM

## 2023-10-31 RX ORDER — SODIUM CHLORIDE 0.9 % (FLUSH) 0.9 %
10 SYRINGE (ML) INJECTION AS NEEDED
OUTPATIENT
Start: 2023-10-31

## 2023-10-31 RX ADMIN — Medication 10 ML: at 11:24

## 2023-10-31 RX ADMIN — SODIUM CHLORIDE 500 ML: 9 INJECTION, SOLUTION INTRAVENOUS at 10:34

## 2023-10-31 RX ADMIN — Medication 500 UNITS: at 11:24

## 2023-10-31 NOTE — NURSING NOTE
Lab Results   Component Value Date    GLUCOSE 61 (L) 10/31/2023    BUN 15 10/31/2023    CREATININE 0.80 10/31/2023    EGFR 94.0 10/31/2023    BCR 18.8 10/31/2023    K 3.5 10/31/2023    CO2 24.3 10/31/2023    CALCIUM 9.1 10/31/2023    PROTENTOTREF 7.0 06/13/2023    ALBUMIN 4.1 10/31/2023    BILITOT 0.3 10/31/2023    AST 22 10/31/2023    ALT 21 10/31/2023    Glucose 61.  Pt given orange juice and peanut butter and crackers.  Kidney function WNL.  Pt to receive a total of 500 cc NS per IVÁN NICHOLSON.

## 2023-11-06 DIAGNOSIS — C16.9 GASTRIC ADENOCARCINOMA: Primary | ICD-10-CM

## 2023-11-06 NOTE — PROGRESS NOTES
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 nodes in the axilla and both groin regions.  The patient was now scheduled for EUS next week with results pending.  Pending the T stage he could well be a candidate for neoadjuvant chemotherapy but we find now that his peripheral neuropathy is both severe and longstanding and thus the use of certain chemotherapy regimens such as FLOT (frequently used) is of concern since peripheral neuropathy risk could be quite high.  Alternative FOLFOX could be utilized with oxaliplatin dose adjusted pending his degree of neuropathic symptoms.  He returned to laboratory undergoing exams including normal B12, ferritin, iron profile 10% saturation, CEA 4.56, hemoglobin A1c of 6.50.  Additionally underwent teaching for FOLFOX chemotherapy.  Dr. Varela has contacted me indicating that EUS is scheduled 6/21/2023 and he will schedule PowerPort 6/23/2023.  The patient proceeded to PET/CT performed 5/3/2023 showing low-level activity in the short segment of the lesser curvature at the site of his gastric malignancy, otherwise negative with very low-level metabolic activity in the abdomen and shotty nodes in the axilla and both groin regions.  The patient continued his staging undergoing EGD and EUS 6/21/2023 demonstrated gastric ulceration/mass in the mid to proximal portion lesser curvature of the stomach measuring 5-7 cm, EUS with gastric  mass and invasion of muscularis propria with 1 area penetrating through the muscularis propria into the adventitia-likely T2 lesion, 3 small lymph nodes celiac axis/gastrohepatic region not overtly hyperechoic largest measuring 6 mm, normal pancreatic EUS and fatty liver with no metastatic lesions.  FNB x2 performed the largest lymph node negative cytologically.  He had seen  6/21/2023 with plans to approach a T2 lesion or higher with chemotherapy neoadjuvant adjuvant settings-likely to require total gastrectomy, partial Ellsworth Arnulfo esophagectomy and Steffany-en-Y reconstruction.  Seen back in office 6/27/2023 to proceed with chemotherapy-FOLFOX.  We discussed that he would be offered 4 cycles preoperative and 4 cycles postoperative pending tolerance.  His findings and course will be discussed with his surgeons as we proceed.  Patient reviewed back today following first cycle of FOLFOX.  He reports he is feeling well and has remained asymptomatic.  He has not noted any increased neuropathy from his baseline.  He denies any nausea, vomiting, increased arthralgias, cold sensation, or diarrhea.  He did have some mild constipation initially that resolved on its own.  He has remained active and continues to eat and drink well.  He does not feel that he needs any IV fluids today.  The patient is seen 7/11/2023 for his second cycle of FOLFOX chemotherapy.  7/25/2023 cycle 3 neoadjuvant FOLFOX (out of 4 preoperative cycles planned).  Tolerating well.  We went on to proceed with cycle 3 FOLFOX and plans for the patient to be seen 2 weeks later.  In the meantime he has been seen by Dr. Varela anticipating surgical resection to require total gastrectomy, partial Jasiel Arnulfo esophagectomy and Steffany-en-Y reconstruction.  Currently the patient is scheduled for a PET 8/14/2023 and review by Dr. Varela 8/17/2023.  The patient is next seen 8/9/2023.  He is ready to proceed with his fourth cycle of therapy and subsequent PET/CT.  His case  has been discussed with  as well as to his plans described above.  The patient proceeded to PET/CT 8/14/2023 to document resolution of focal hypermetabolism along the gastric lesser curvature, subcentimeter upper abdominal retroperitoneal lymphadenopathy was too small to characterize as were unchanged 5 to 6 mm right lower lobe solid pulmonary nodules.  The patient was admitted 9/13-9/18 undergoing Noble total gastrectomy with Steffany-en-Y, esophagojejunostomy and jejunal feeding tube placement.  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  Final stage: [id number].  The patient was seen back by Dr. Mckenzie 9/28/2023 with his incision healing and staples removed, J-tube had not been used and was removed patient was doing well enough to be referred back to proceed with his next portion of therapy-4 cycles of FOLFOX.  Patient seen 10/17/2023.  He is seen formally 10/16/2023.  Fortunately he is doing exceedingly well and, while he is working to manage his new diet, is confirmed with Dr. Mckenzie that we can proceed with her second phase of chemotherapy with 4 additional cycles of FOLFOX.  10/24/2023: Cycle 5 FOLFOX.  Was previously struggling with constipation, for the last week has had diarrhea up to 8 episodes daily.  Has not taken any antidiarrheals, reports he was unsure if he could take these following surgery.  Weight is down approximately 10 pounds since his last visit.  Admits he is not eating much, as he typically has  diarrhea after eating.  Also admits he is not drinking a lot of fluid, about 20 ounces daily.  He is scheduled to see [facility] dietitian following our visit today who plans to review dietary recommendations.  Recommended he start Imodium as needed for diarrhea.  If no improvement with Imodium plan to start Lomotil.  We will proceed with 500 cc normal saline prior to chemotherapy.   Magnesium checked today and normal at 1.9.  10/31/2023: Diarrhea is significantly improved with Imodium use.  Typically taking 1 Imodium tablet every 3-4 days.  Feels his fluid intake has been doing good.  Proceed with 500 cc normal saline today while we await CMP results.  Creatinine normal, patient will not receive any further fluids.  11/7/2023: cycle 6 FOLFOX today.  Tolerating well.    *Venous access  patient underwent Mediport placement 6/23/2023   Patient's port evaluated 7/10/2023 having been accessed for week after last visit.  Fortunately the area in question is not significantly inflamed nor fluctuant.  We have deaccessed, cleaned the site, reaccessed and plan to proceed with blood cultures pending.  Size reevaluated 8/8/2023 without additional information    *Hypertension  Patient admits he has been noncompliant with his medications, does not take them regularly.    Blood pressure 10/26/2023 185/102.  He is fortunately asymptomatic.  Strongly encouraged him to resume blood pressure medications and close monitoring of blood pressure at home, reports he is agreeable with this.   10/31/2023: Blood pressure 176/102.  Has been compliant with his blood pressure medication over the last week.  Asymptomatic.  Checking his blood pressure at home and reports it typically runs 150s over 90s.  He plans to follow-up with his PCP in regards to blood pressure management.  Did advise if he becomes symptomatic he should present to the ER.  11/7/2023: BP today 167/91.  Recently started on clonidine patch.  Having some neck stiffness from the  clonidine patch.  Follows again with his PCP Thursday for further management..    *Hypokalemia  Potassium 2.9.  Patient again admits he has not been taking his oral potassium regularly.  Encouraged him to continue taking 20 mEq twice daily.  Also encouraged increasing potassium in his diet.    PLAN:   Proceed with cycle 6 FOLFOX today.  Continue potassium 20 mEq tablets 1 tablet twice daily.  Continue follow-up with oncology dietitian.  Continue Imodium as needed for diarrhea  Continue blood pressure medication and monitoring blood pressure closely at home.  We will follow-up with PCP in regards to hypertension control.  Return in 2 weeks for MD follow-up and cycle 7 FOLFOX.  Plans are to proceed with 4 total additional treatments  Offered referral to supportive oncology team today.  Patient did decline.    Patient is on a high risk medication requiring close monitoring for toxicity.    Ngozi Elizabeth, APRN  11/07/23

## 2023-11-07 ENCOUNTER — OFFICE VISIT (OUTPATIENT)
Dept: ONCOLOGY | Facility: CLINIC | Age: 73
End: 2023-11-07
Payer: MEDICARE

## 2023-11-07 ENCOUNTER — INFUSION (OUTPATIENT)
Dept: ONCOLOGY | Facility: HOSPITAL | Age: 73
End: 2023-11-07
Payer: MEDICARE

## 2023-11-07 VITALS
HEART RATE: 71 BPM | SYSTOLIC BLOOD PRESSURE: 167 MMHG | BODY MASS INDEX: 27.28 KG/M2 | OXYGEN SATURATION: 98 % | DIASTOLIC BLOOD PRESSURE: 91 MMHG | WEIGHT: 180 LBS | RESPIRATION RATE: 15 BRPM | TEMPERATURE: 96.4 F | HEIGHT: 68 IN

## 2023-11-07 DIAGNOSIS — C16.9 GASTRIC ADENOCARCINOMA: Primary | ICD-10-CM

## 2023-11-07 DIAGNOSIS — Z79.899 HIGH RISK MEDICATION USE: ICD-10-CM

## 2023-11-07 DIAGNOSIS — C16.9 GASTRIC ADENOCARCINOMA: ICD-10-CM

## 2023-11-07 LAB
ALBUMIN SERPL-MCNC: 3.7 G/DL (ref 3.5–5.2)
ALBUMIN/GLOB SERPL: 1.3 G/DL
ALP SERPL-CCNC: 80 U/L (ref 39–117)
ALT SERPL W P-5'-P-CCNC: 15 U/L (ref 1–41)
ANION GAP SERPL CALCULATED.3IONS-SCNC: 12.3 MMOL/L (ref 5–15)
AST SERPL-CCNC: 20 U/L (ref 1–40)
BASOPHILS # BLD AUTO: 0.01 10*3/MM3 (ref 0–0.2)
BASOPHILS NFR BLD AUTO: 0.2 % (ref 0–1.5)
BILIRUB SERPL-MCNC: 0.5 MG/DL (ref 0–1.2)
BUN SERPL-MCNC: 9 MG/DL (ref 8–23)
BUN/CREAT SERPL: 11.3 (ref 7–25)
CALCIUM SPEC-SCNC: 9 MG/DL (ref 8.6–10.5)
CHLORIDE SERPL-SCNC: 106 MMOL/L (ref 98–107)
CO2 SERPL-SCNC: 24.7 MMOL/L (ref 22–29)
CREAT SERPL-MCNC: 0.8 MG/DL (ref 0.76–1.27)
DEPRECATED RDW RBC AUTO: 46.1 FL (ref 37–54)
EGFRCR SERPLBLD CKD-EPI 2021: 94 ML/MIN/1.73
EOSINOPHIL # BLD AUTO: 0.14 10*3/MM3 (ref 0–0.4)
EOSINOPHIL NFR BLD AUTO: 2.2 % (ref 0.3–6.2)
ERYTHROCYTE [DISTWIDTH] IN BLOOD BY AUTOMATED COUNT: 14.4 % (ref 12.3–15.4)
GLOBULIN UR ELPH-MCNC: 2.8 GM/DL
GLUCOSE SERPL-MCNC: 97 MG/DL (ref 65–99)
HCT VFR BLD AUTO: 33.8 % (ref 37.5–51)
HGB BLD-MCNC: 11.3 G/DL (ref 13–17.7)
IMM GRANULOCYTES # BLD AUTO: 0.01 10*3/MM3 (ref 0–0.05)
IMM GRANULOCYTES NFR BLD AUTO: 0.2 % (ref 0–0.5)
LYMPHOCYTES # BLD AUTO: 1.57 10*3/MM3 (ref 0.7–3.1)
LYMPHOCYTES NFR BLD AUTO: 24.2 % (ref 19.6–45.3)
MCH RBC QN AUTO: 29.4 PG (ref 26.6–33)
MCHC RBC AUTO-ENTMCNC: 33.4 G/DL (ref 31.5–35.7)
MCV RBC AUTO: 88 FL (ref 79–97)
MONOCYTES # BLD AUTO: 0.84 10*3/MM3 (ref 0.1–0.9)
MONOCYTES NFR BLD AUTO: 13 % (ref 5–12)
NEUTROPHILS NFR BLD AUTO: 3.91 10*3/MM3 (ref 1.7–7)
NEUTROPHILS NFR BLD AUTO: 60.2 % (ref 42.7–76)
NRBC BLD AUTO-RTO: 0 /100 WBC (ref 0–0.2)
PLATELET # BLD AUTO: 179 10*3/MM3 (ref 140–450)
PMV BLD AUTO: 9.6 FL (ref 6–12)
POTASSIUM SERPL-SCNC: 2.9 MMOL/L (ref 3.5–5.2)
PROT SERPL-MCNC: 6.5 G/DL (ref 6–8.5)
RBC # BLD AUTO: 3.84 10*6/MM3 (ref 4.14–5.8)
SODIUM SERPL-SCNC: 143 MMOL/L (ref 136–145)
WBC NRBC COR # BLD: 6.48 10*3/MM3 (ref 3.4–10.8)

## 2023-11-07 PROCEDURE — 96368 THER/DIAG CONCURRENT INF: CPT

## 2023-11-07 PROCEDURE — 96375 TX/PRO/DX INJ NEW DRUG ADDON: CPT

## 2023-11-07 PROCEDURE — 0 DEXTROSE 5 % SOLUTION 250 ML FLEX CONT: Performed by: NURSE PRACTITIONER

## 2023-11-07 PROCEDURE — 96367 TX/PROPH/DG ADDL SEQ IV INF: CPT

## 2023-11-07 PROCEDURE — 25010000002 FOSAPREPITANT PER 1 MG: Performed by: NURSE PRACTITIONER

## 2023-11-07 PROCEDURE — 25010000002 LEUCOVORIN CALCIUM PER 50 MG: Performed by: NURSE PRACTITIONER

## 2023-11-07 PROCEDURE — 25010000002 DEXAMETHASONE SODIUM PHOSPHATE 100 MG/10ML SOLUTION: Performed by: NURSE PRACTITIONER

## 2023-11-07 PROCEDURE — G0498 CHEMO EXTEND IV INFUS W/PUMP: HCPCS

## 2023-11-07 PROCEDURE — 96413 CHEMO IV INFUSION 1 HR: CPT

## 2023-11-07 PROCEDURE — 80053 COMPREHEN METABOLIC PANEL: CPT | Performed by: INTERNAL MEDICINE

## 2023-11-07 PROCEDURE — 25010000002 OXALIPLATIN PER 0.5 MG: Performed by: NURSE PRACTITIONER

## 2023-11-07 PROCEDURE — 25010000002 PALONOSETRON PER 25 MCG: Performed by: NURSE PRACTITIONER

## 2023-11-07 PROCEDURE — 96416 CHEMO PROLONG INFUSE W/PUMP: CPT

## 2023-11-07 PROCEDURE — 25810000003 SODIUM CHLORIDE 0.9 % SOLUTION 250 ML FLEX CONT: Performed by: NURSE PRACTITIONER

## 2023-11-07 PROCEDURE — 0 DEXTROSE 5 % SOLUTION: Performed by: NURSE PRACTITIONER

## 2023-11-07 PROCEDURE — 96415 CHEMO IV INFUSION ADDL HR: CPT

## 2023-11-07 PROCEDURE — 25010000002 FLUOROURACIL PER 500 MG: Performed by: NURSE PRACTITIONER

## 2023-11-07 PROCEDURE — 96411 CHEMO IV PUSH ADDL DRUG: CPT

## 2023-11-07 PROCEDURE — 85025 COMPLETE CBC W/AUTO DIFF WBC: CPT | Performed by: INTERNAL MEDICINE

## 2023-11-07 RX ORDER — FLUOROURACIL 50 MG/ML
400 INJECTION, SOLUTION INTRAVENOUS ONCE
Status: CANCELLED | OUTPATIENT
Start: 2023-11-07

## 2023-11-07 RX ORDER — CLONIDINE 0.2 MG/24H
1 PATCH, EXTENDED RELEASE TRANSDERMAL WEEKLY
COMMUNITY
Start: 2023-11-04

## 2023-11-07 RX ORDER — FLUOROURACIL 50 MG/ML
400 INJECTION, SOLUTION INTRAVENOUS ONCE
Status: COMPLETED | OUTPATIENT
Start: 2023-11-07 | End: 2023-11-07

## 2023-11-07 RX ORDER — DIPHENHYDRAMINE HYDROCHLORIDE 50 MG/ML
50 INJECTION INTRAMUSCULAR; INTRAVENOUS AS NEEDED
Status: CANCELLED | OUTPATIENT
Start: 2023-11-07

## 2023-11-07 RX ORDER — FAMOTIDINE 10 MG/ML
20 INJECTION, SOLUTION INTRAVENOUS AS NEEDED
Status: CANCELLED | OUTPATIENT
Start: 2023-11-07

## 2023-11-07 RX ORDER — DEXTROSE MONOHYDRATE 50 MG/ML
250 INJECTION, SOLUTION INTRAVENOUS ONCE
Status: CANCELLED | OUTPATIENT
Start: 2023-11-07

## 2023-11-07 RX ORDER — PALONOSETRON 0.05 MG/ML
0.25 INJECTION, SOLUTION INTRAVENOUS ONCE
Status: COMPLETED | OUTPATIENT
Start: 2023-11-07 | End: 2023-11-07

## 2023-11-07 RX ORDER — PALONOSETRON 0.05 MG/ML
0.25 INJECTION, SOLUTION INTRAVENOUS ONCE
Status: CANCELLED | OUTPATIENT
Start: 2023-11-07

## 2023-11-07 RX ORDER — DEXTROSE MONOHYDRATE 50 MG/ML
250 INJECTION, SOLUTION INTRAVENOUS ONCE
Status: COMPLETED | OUTPATIENT
Start: 2023-11-07 | End: 2023-11-07

## 2023-11-07 RX ADMIN — OXALIPLATIN 165 MG: 5 INJECTION, SOLUTION INTRAVENOUS at 12:26

## 2023-11-07 RX ADMIN — FLUOROURACIL 780 MG: 50 INJECTION, SOLUTION INTRAVENOUS at 14:39

## 2023-11-07 RX ADMIN — PALONOSETRON 0.25 MG: 0.05 INJECTION, SOLUTION INTRAVENOUS at 11:28

## 2023-11-07 RX ADMIN — FOSAPREPITANT 100 ML: 150 INJECTION, POWDER, LYOPHILIZED, FOR SOLUTION INTRAVENOUS at 11:45

## 2023-11-07 RX ADMIN — FLUOROURACIL 4660 MG: 50 INJECTION, SOLUTION INTRAVENOUS at 14:45

## 2023-11-07 RX ADMIN — DEXTROSE MONOHYDRATE 250 ML: 50 INJECTION, SOLUTION INTRAVENOUS at 10:02

## 2023-11-07 RX ADMIN — DEXAMETHASONE SODIUM PHOSPHATE 12 MG: 10 INJECTION, SOLUTION INTRAMUSCULAR; INTRAVENOUS at 11:29

## 2023-11-07 RX ADMIN — LEUCOVORIN CALCIUM 780 MG: 350 INJECTION, POWDER, LYOPHILIZED, FOR SOLUTION INTRAMUSCULAR; INTRAVENOUS at 12:26

## 2023-11-07 NOTE — NURSING NOTE
Patient voices having a new stiff neck. Patient recently started on clonidine patch for his BP and states he thinks the symptoms is from that. Patient states he has not yet notified ordering provider. RN notified BHARAT Almeida for NP follow-up today.     Patient's potassium is 2.9 today. Has PO potassium BID ordered, but states he has not been taking it due to it getting stuck in mucus or him spitting it back up because it is large. Patient educated on importance of taking his potassium and he states he will resume taking it and put it in applesauce. Provided handout with list of high potassium foods. RN notified BHARAT Almeida for NP follow-up today.     BHARAT Almeida at chairside with patient for follow-up. OK from BHARAT Almeida to proceed with treatment.     Patient tolerated treatment without complaints. Discharged in stable condition with 5fu ball attached/infusing.

## 2023-11-09 ENCOUNTER — INFUSION (OUTPATIENT)
Dept: ONCOLOGY | Facility: HOSPITAL | Age: 73
End: 2023-11-09
Payer: MEDICARE

## 2023-11-09 DIAGNOSIS — Z45.2 FITTING AND ADJUSTMENT OF VASCULAR CATHETER: Primary | ICD-10-CM

## 2023-11-09 DIAGNOSIS — C16.9 GASTRIC ADENOCARCINOMA: ICD-10-CM

## 2023-11-09 PROCEDURE — 25010000002 HEPARIN LOCK FLUSH PER 10 UNITS: Performed by: INTERNAL MEDICINE

## 2023-11-09 RX ORDER — SODIUM CHLORIDE 0.9 % (FLUSH) 0.9 %
10 SYRINGE (ML) INJECTION AS NEEDED
OUTPATIENT
Start: 2023-11-09

## 2023-11-09 RX ORDER — HEPARIN SODIUM (PORCINE) LOCK FLUSH IV SOLN 100 UNIT/ML 100 UNIT/ML
500 SOLUTION INTRAVENOUS AS NEEDED
Status: DISCONTINUED | OUTPATIENT
Start: 2023-11-09 | End: 2023-11-09 | Stop reason: HOSPADM

## 2023-11-09 RX ORDER — SODIUM CHLORIDE 0.9 % (FLUSH) 0.9 %
10 SYRINGE (ML) INJECTION AS NEEDED
Status: DISCONTINUED | OUTPATIENT
Start: 2023-11-09 | End: 2023-11-09 | Stop reason: HOSPADM

## 2023-11-09 RX ORDER — HEPARIN SODIUM (PORCINE) LOCK FLUSH IV SOLN 100 UNIT/ML 100 UNIT/ML
500 SOLUTION INTRAVENOUS AS NEEDED
OUTPATIENT
Start: 2023-11-09

## 2023-11-09 RX ADMIN — Medication 10 ML: at 12:31

## 2023-11-09 RX ADMIN — Medication 500 UNITS: at 12:31

## 2023-11-14 ENCOUNTER — TELEPHONE (OUTPATIENT)
Dept: ONCOLOGY | Facility: CLINIC | Age: 73
End: 2023-11-14
Payer: MEDICARE

## 2023-11-14 DIAGNOSIS — C16.0 MALIGNANT NEOPLASM OF CARDIA OF STOMACH: ICD-10-CM

## 2023-11-14 DIAGNOSIS — C16.9 GASTRIC ADENOCARCINOMA: ICD-10-CM

## 2023-11-14 DIAGNOSIS — C16.5 MALIGNANT NEOPLASM OF LESSER CURVATURE OF STOMACH, UNSPECIFIED: ICD-10-CM

## 2023-11-14 DIAGNOSIS — C16.9 MALIGNANT NEOPLASM OF STOMACH, UNSPECIFIED LOCATION: Primary | ICD-10-CM

## 2023-11-14 DIAGNOSIS — Z79.899 HIGH RISK MEDICATION USE: ICD-10-CM

## 2023-11-14 DIAGNOSIS — C16.9 GASTRIC ADENOCARCINOMA: Primary | ICD-10-CM

## 2023-11-14 NOTE — TELEPHONE ENCOUNTER
Provider: Vani  Caller: Bill   Relationship to Patient: son  Call Back Phone Number: [phone]  Reason for Call: Pt son calling to see if pt can get in for IV fluids today?

## 2023-11-14 NOTE — TELEPHONE ENCOUNTER
Called and s/w pt's son and pt. Pt states he feels that he is dehydrated. He is very weak and gets dizzy at times. He has not been eating or drinking much lately. Pt received treatment last week. D/W Ngozi Elizabeth NP. Per Ngozi, pt can come in tomorrow for NP visit and IVF. Informed pt and he v/u. Message sent to scheduling.

## 2023-11-15 ENCOUNTER — OFFICE VISIT (OUTPATIENT)
Dept: ONCOLOGY | Facility: CLINIC | Age: 73
End: 2023-11-15
Payer: MEDICARE

## 2023-11-15 ENCOUNTER — INFUSION (OUTPATIENT)
Dept: ONCOLOGY | Facility: HOSPITAL | Age: 73
End: 2023-11-15
Payer: MEDICARE

## 2023-11-15 VITALS
HEART RATE: 61 BPM | TEMPERATURE: 98 F | SYSTOLIC BLOOD PRESSURE: 160 MMHG | WEIGHT: 171.1 LBS | HEIGHT: 68 IN | OXYGEN SATURATION: 98 % | BODY MASS INDEX: 25.93 KG/M2 | DIASTOLIC BLOOD PRESSURE: 88 MMHG | RESPIRATION RATE: 18 BRPM

## 2023-11-15 DIAGNOSIS — C16.9 MALIGNANT NEOPLASM OF STOMACH, UNSPECIFIED LOCATION: ICD-10-CM

## 2023-11-15 DIAGNOSIS — C16.9 MALIGNANT NEOPLASM OF STOMACH, UNSPECIFIED LOCATION: Primary | ICD-10-CM

## 2023-11-15 DIAGNOSIS — Z79.899 HIGH RISK MEDICATION USE: ICD-10-CM

## 2023-11-15 DIAGNOSIS — R63.4 UNINTENTIONAL WEIGHT LOSS: ICD-10-CM

## 2023-11-15 DIAGNOSIS — Z45.2 FITTING AND ADJUSTMENT OF VASCULAR CATHETER: ICD-10-CM

## 2023-11-15 DIAGNOSIS — C16.9 GASTRIC ADENOCARCINOMA: ICD-10-CM

## 2023-11-15 DIAGNOSIS — C16.9 GASTRIC ADENOCARCINOMA: Primary | ICD-10-CM

## 2023-11-15 DIAGNOSIS — C16.0 MALIGNANT NEOPLASM OF CARDIA OF STOMACH: ICD-10-CM

## 2023-11-15 DIAGNOSIS — C16.5 MALIGNANT NEOPLASM OF LESSER CURVATURE OF STOMACH, UNSPECIFIED: ICD-10-CM

## 2023-11-15 LAB
ALBUMIN SERPL-MCNC: 4.2 G/DL (ref 3.5–5.2)
ALBUMIN/GLOB SERPL: 1.5 G/DL
ALP SERPL-CCNC: 85 U/L (ref 39–117)
ALT SERPL W P-5'-P-CCNC: 21 U/L (ref 1–41)
ANION GAP SERPL CALCULATED.3IONS-SCNC: 9.6 MMOL/L (ref 5–15)
AST SERPL-CCNC: 21 U/L (ref 1–40)
BASOPHILS # BLD AUTO: 0.01 10*3/MM3 (ref 0–0.2)
BASOPHILS NFR BLD AUTO: 0.1 % (ref 0–1.5)
BILIRUB SERPL-MCNC: 0.3 MG/DL (ref 0–1.2)
BUN SERPL-MCNC: 17 MG/DL (ref 8–23)
BUN/CREAT SERPL: 20.5 (ref 7–25)
CALCIUM SPEC-SCNC: 9.7 MG/DL (ref 8.6–10.5)
CHLORIDE SERPL-SCNC: 105 MMOL/L (ref 98–107)
CO2 SERPL-SCNC: 26.4 MMOL/L (ref 22–29)
CREAT SERPL-MCNC: 0.83 MG/DL (ref 0.76–1.27)
DEPRECATED RDW RBC AUTO: 45 FL (ref 37–54)
EGFRCR SERPLBLD CKD-EPI 2021: 93 ML/MIN/1.73
EOSINOPHIL # BLD AUTO: 0.11 10*3/MM3 (ref 0–0.4)
EOSINOPHIL NFR BLD AUTO: 1.4 % (ref 0.3–6.2)
ERYTHROCYTE [DISTWIDTH] IN BLOOD BY AUTOMATED COUNT: 14.1 % (ref 12.3–15.4)
GLOBULIN UR ELPH-MCNC: 2.8 GM/DL
GLUCOSE SERPL-MCNC: 117 MG/DL (ref 65–99)
HCT VFR BLD AUTO: 38.3 % (ref 37.5–51)
HGB BLD-MCNC: 12.9 G/DL (ref 13–17.7)
IMM GRANULOCYTES # BLD AUTO: 0.05 10*3/MM3 (ref 0–0.05)
IMM GRANULOCYTES NFR BLD AUTO: 0.7 % (ref 0–0.5)
LYMPHOCYTES # BLD AUTO: 2.53 10*3/MM3 (ref 0.7–3.1)
LYMPHOCYTES NFR BLD AUTO: 33.2 % (ref 19.6–45.3)
MAGNESIUM SERPL-MCNC: 2.3 MG/DL (ref 1.6–2.4)
MCH RBC QN AUTO: 29.3 PG (ref 26.6–33)
MCHC RBC AUTO-ENTMCNC: 33.7 G/DL (ref 31.5–35.7)
MCV RBC AUTO: 87 FL (ref 79–97)
MONOCYTES # BLD AUTO: 0.51 10*3/MM3 (ref 0.1–0.9)
MONOCYTES NFR BLD AUTO: 6.7 % (ref 5–12)
NEUTROPHILS NFR BLD AUTO: 4.42 10*3/MM3 (ref 1.7–7)
NEUTROPHILS NFR BLD AUTO: 57.9 % (ref 42.7–76)
NRBC BLD AUTO-RTO: 0 /100 WBC (ref 0–0.2)
PLATELET # BLD AUTO: 196 10*3/MM3 (ref 140–450)
PMV BLD AUTO: 9.9 FL (ref 6–12)
POTASSIUM SERPL-SCNC: 3.4 MMOL/L (ref 3.5–5.2)
PROT SERPL-MCNC: 7 G/DL (ref 6–8.5)
RBC # BLD AUTO: 4.4 10*6/MM3 (ref 4.14–5.8)
SODIUM SERPL-SCNC: 141 MMOL/L (ref 136–145)
WBC NRBC COR # BLD: 7.63 10*3/MM3 (ref 3.4–10.8)

## 2023-11-15 PROCEDURE — 25010000002 HEPARIN LOCK FLUSH PER 10 UNITS: Performed by: INTERNAL MEDICINE

## 2023-11-15 PROCEDURE — 83735 ASSAY OF MAGNESIUM: CPT | Performed by: INTERNAL MEDICINE

## 2023-11-15 PROCEDURE — 85025 COMPLETE CBC W/AUTO DIFF WBC: CPT | Performed by: INTERNAL MEDICINE

## 2023-11-15 PROCEDURE — 96360 HYDRATION IV INFUSION INIT: CPT

## 2023-11-15 PROCEDURE — 96365 THER/PROPH/DIAG IV INF INIT: CPT

## 2023-11-15 PROCEDURE — 25810000003 SODIUM CHLORIDE 0.9 % SOLUTION: Performed by: NURSE PRACTITIONER

## 2023-11-15 PROCEDURE — 80053 COMPREHEN METABOLIC PANEL: CPT | Performed by: INTERNAL MEDICINE

## 2023-11-15 RX ORDER — SODIUM CHLORIDE 9 MG/ML
500 INJECTION, SOLUTION INTRAVENOUS ONCE
Status: COMPLETED | OUTPATIENT
Start: 2023-11-15 | End: 2023-11-15

## 2023-11-15 RX ORDER — HEPARIN SODIUM (PORCINE) LOCK FLUSH IV SOLN 100 UNIT/ML 100 UNIT/ML
500 SOLUTION INTRAVENOUS AS NEEDED
Status: DISCONTINUED | OUTPATIENT
Start: 2023-11-15 | End: 2023-11-15 | Stop reason: HOSPADM

## 2023-11-15 RX ORDER — HEPARIN SODIUM (PORCINE) LOCK FLUSH IV SOLN 100 UNIT/ML 100 UNIT/ML
500 SOLUTION INTRAVENOUS AS NEEDED
OUTPATIENT
Start: 2023-11-15

## 2023-11-15 RX ORDER — SODIUM CHLORIDE 0.9 % (FLUSH) 0.9 %
10 SYRINGE (ML) INJECTION AS NEEDED
Status: CANCELLED | OUTPATIENT
Start: 2023-11-15

## 2023-11-15 RX ORDER — SODIUM CHLORIDE 0.9 % (FLUSH) 0.9 %
10 SYRINGE (ML) INJECTION AS NEEDED
OUTPATIENT
Start: 2023-11-15

## 2023-11-15 RX ORDER — SODIUM CHLORIDE 0.9 % (FLUSH) 0.9 %
10 SYRINGE (ML) INJECTION AS NEEDED
Status: DISCONTINUED | OUTPATIENT
Start: 2023-11-15 | End: 2023-11-15 | Stop reason: HOSPADM

## 2023-11-15 RX ORDER — HEPARIN SODIUM (PORCINE) LOCK FLUSH IV SOLN 100 UNIT/ML 100 UNIT/ML
500 SOLUTION INTRAVENOUS AS NEEDED
Status: CANCELLED | OUTPATIENT
Start: 2023-11-15

## 2023-11-15 RX ADMIN — Medication 500 UNITS: at 15:33

## 2023-11-15 RX ADMIN — SODIUM CHLORIDE 500 ML: 9 INJECTION, SOLUTION INTRAVENOUS at 14:26

## 2023-11-15 RX ADMIN — Medication 10 ML: at 15:33

## 2023-11-15 NOTE — NURSING NOTE
"Port accessed and labs drawn prior to NP appointment and IV fluids. Talking with patient and he has had loss of appetite and increased weight loss. Patient states he is not eating or drinking much. Patient states that he has never felt this \"terrible\" with prior treatments. Notified BHARAT Bean prior to his appointment with her and okay to put order in for Nutrition Consult/to discuss Enterade.     Called and s/w [facility]. Noted that RD has s/w patient prior. OK to give patient a couple enterade and instructions. States she will call him Thursday or Friday to see if that was effective. Patient educated, instructed to try small frequent meals and he had discussed with [facility]. Instructed to increase his protein intake. Patient verbalizes understanding.   "

## 2023-11-15 NOTE — PROGRESS NOTES
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 chemotherapy given neoadjuvantly.  The patient was seen 6/5/2023 and it was determined that endoscopic ultrasound be obtained and if this revealed T1 tumor and he be a good candidate for initial surgery.  The patient is to be seen by Dr. Hodge concerning this.  The patient is seen on 6/13/2023 in office.  We have discussed, in detail with his son present, his current status including the need for EUS, subsequent port placement as we determine his tumor stage.  Available, currently, his PET/CT performed 5/3/2023 showing low-level activity in the short segment of the lesser curvature at the site of his gastric malignancy, otherwise negative with very low-level metabolic activity in the abdomen and shotty nodes in the axilla and both groin regions.  The patient was now scheduled for EUS next week with results pending.  Pending the T stage he could well be a candidate for neoadjuvant chemotherapy but we find now that his peripheral neuropathy is both severe and longstanding and thus the use of certain chemotherapy regimens such as FLOT (frequently used) is of concern since peripheral neuropathy risk could be quite high.  Alternative FOLFOX could be utilized with oxaliplatin dose adjusted pending his degree of neuropathic symptoms.  He returned to laboratory undergoing exams including normal B12, ferritin, iron profile  10% saturation, CEA 4.56, hemoglobin A1c of 6.50.  Additionally underwent teaching for FOLFOX chemotherapy.  Dr. Varela has contacted me indicating that EUS is scheduled 6/21/2023 and he will schedule PowerPort 6/23/2023.  The patient proceeded to PET/CT performed 5/3/2023 showing low-level activity in the short segment of the lesser curvature at the site of his gastric malignancy, otherwise negative with very low-level metabolic activity in the abdomen and shotty nodes in the axilla and both groin regions.  The patient continued his staging undergoing EGD and EUS 6/21/2023 demonstrated gastric ulceration/mass in the mid to proximal portion lesser curvature of the stomach measuring 5-7 cm, EUS with gastric mass and invasion of muscularis propria with 1 area penetrating through the muscularis propria into the adventitia-likely T2 lesion, 3 small lymph nodes celiac axis/gastrohepatic region not overtly hyperechoic largest measuring 6 mm, normal pancreatic EUS and fatty liver with no metastatic lesions.  FNB x2 performed the largest lymph node negative cytologically.  He had seen  6/21/2023 with plans to approach a T2 lesion or higher with chemotherapy neoadjuvant adjuvant settings-likely to require total gastrectomy, partial [place] Arnulfo esophagectomy and Steffany-en-Y reconstruction.  Seen back in office 6/27/2023 to proceed with chemotherapy-FOLFOX.  We discussed that he would be offered 4 cycles preoperative and 4 cycles postoperative pending tolerance.  His findings and course will be discussed with his surgeons as we proceed.  Patient reviewed back today following first cycle of FOLFOX.  He reports he is feeling well and has remained asymptomatic.  He has not noted any increased neuropathy from his baseline.  He denies any nausea, vomiting, increased arthralgias, cold sensation, or diarrhea.  He did have some mild constipation initially that resolved on its own.  He has remained active and continues to eat and drink  well.  He does not feel that he needs any IV fluids today.  The patient is seen 7/11/2023 for his second cycle of FOLFOX chemotherapy.  7/25/2023 cycle 3 neoadjuvant FOLFOX (out of 4 preoperative cycles planned).  Tolerating well.  We went on to proceed with cycle 3 FOLFOX and plans for the patient to be seen 2 weeks later.  In the meantime he has been seen by Dr. Varela anticipating surgical resection to require total gastrectomy, partial [place] Arnulfo esophagectomy and Steffany-en-Y reconstruction.  Currently the patient is scheduled for a PET 8/14/2023 and review by Dr. Varela 8/17/2023.  The patient is next seen 8/9/2023.  He is ready to proceed with his fourth cycle of therapy and subsequent PET/CT.  His case has been discussed with  as well as to his plans described above.  The patient proceeded to PET/CT 8/14/2023 to document resolution of focal hypermetabolism along the gastric lesser curvature, subcentimeter upper abdominal retroperitoneal lymphadenopathy was too small to characterize as were unchanged 5 to 6 mm right lower lobe solid pulmonary nodules.  The patient was admitted 9/13-9/18 undergoing Noble total gastrectomy with Steffany-en-Y, esophagojejunostomy and jejunal feeding tube placement.  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  Final stage: [id number].  The patient was seen back by Dr. Mckenzie 9/28/2023 with his incision healing and staples removed, J-tube had not been used and was removed patient was doing well enough  to be referred back to proceed with his next portion of therapy-4 cycles of FOLFOX.  Patient seen 10/17/2023.  He is seen formally 10/16/2023.  Fortunately he is doing exceedingly well and, while he is working to manage his new diet, is confirmed with Dr. Mckenzie that we can proceed with her second phase of chemotherapy with 4 additional cycles of FOLFOX.  10/24/2023: Cycle 5 FOLFOX.  Was previously struggling with constipation, for the last week has had diarrhea up to 8 episodes daily.  Has not taken any antidiarrheals, reports he was unsure if he could take these following surgery.  Weight is down approximately 10 pounds since his last visit.  Admits he is not eating much, as he typically has diarrhea after eating.  Also admits he is not drinking a lot of fluid, about 20 ounces daily.  He is scheduled to see [place] dietitian following our visit today who plans to review dietary recommendations.  Recommended he start Imodium as needed for diarrhea.  If no improvement with Imodium plan to start Lomotil.  We will proceed with 500 cc normal saline prior to chemotherapy.   Magnesium checked today and normal at 1.9.  10/31/2023: Diarrhea is significantly improved with Imodium use.  Typically taking 1 Imodium tablet every 3-4 days.  Feels his fluid intake has been doing good.  Proceed with 500 cc normal saline today while we await CMP results.  Creatinine normal, patient will not receive any further fluids.  11/7/2023: cycle 6 FOLFOX today.  Tolerating well.  11/15/2023: She has been feeling much more fatigued following treatment.  Continues to have weight loss and has been trying to work on his appetite.  Does feel that continuation of treatment following surgery has been much more difficult on him.  Diarrhea continues to be well managed with Imodium every 3 days.  Denies any nausea.  Did have an episode of dry heaves after attempting to drink liquid IV.  Her concerns noted at this time.  We will proceed with 500 mL of  normal saline today in clinic as we wait for his chemistry panel to result.  His blood pressure was slightly elevated so I am concerned about providing too much fluid at this time.    *Venous access  patient underwent Mediport placement 6/23/2023   Patient's port evaluated 7/10/2023 having been accessed for week after last visit.  Fortunately the area in question is not significantly inflamed nor fluctuant.  We have deaccessed, cleaned the site, reaccessed and plan to proceed with blood cultures pending.  Size reevaluated 8/8/2023 without additional information    *Hypertension  Patient admits he has been noncompliant with his medications, does not take them regularly.    Blood pressure 10/26/2023 185/102.  He is fortunately asymptomatic.  Strongly encouraged him to resume blood pressure medications and close monitoring of blood pressure at home, reports he is agreeable with this.   10/31/2023: Blood pressure 176/102.  Has been compliant with his blood pressure medication over the last week.  Asymptomatic.  Checking his blood pressure at home and reports it typically runs 150s over 90s.  He plans to follow-up with his PCP in regards to blood pressure management.  Did advise if he becomes symptomatic he should present to the ER.  11/7/2023: BP today 167/91.  Recently started on clonidine patch.  Having some neck stiffness from the clonidine patch.  Follows again with his PCP Thursday for further management.  11/15/2023: Pressure remains elevated 160/88.  Denies any chest pain or shortness of breath.  500 mils of normal saline given today for hydration.  Chemistry panel pending.  We will hold on a liter of fluids until labs result as blood pressure is elevated.  Continues to follow-up with his primary care provider for management.    *Hypokalemia  Potassium 2.9.  Patient again admits he has not been taking his oral potassium regularly.  Encouraged him to continue taking 20 mEq twice daily.  Also encouraged increasing  potassium in his diet.  11/15/2023: Potassium is 3.4 today.  He has potassium chloride 20 mill equivalents he has been asked to take twice daily and encouraged to increase his potassium intake in his diet.  Does admit he has not been taking this consistently but does plan to do so.  We will continue to monitor.    *Weight loss  11/15/2023: Patient continues to struggle with his appetite and has been losing weight.  Consult placed to dietitians today and Enterade provided to patient to start in hopes of reducing GI symptoms related to chemotherapy.    PLAN:   500 mL of normal saline given in clinic today.  Patient encouraged to consistently take his potassium 20 mEq twice daily.    Continue follow-up with oncology dietitian.  Continue Imodium as needed for diarrhea  Continue blood pressure medication and monitoring blood pressure closely at home.  He will follow-up with PCP in regards to hypertension control.  Return in 1 week for NP follow-up and cycle 7 FOLFOX.  Plans are to proceed with 4 total additional treatments    Patient remains on high risk medication and requires close monitoring for toxicity.      Patient is on a high risk medication requiring close monitoring for toxicity.    Rodolfo Ferrara, APRN  11/16/23

## 2023-11-16 ENCOUNTER — TELEMEDICINE - AUDIO (OUTPATIENT)
Dept: OTHER | Facility: HOSPITAL | Age: 73
End: 2023-11-16
Payer: MEDICARE

## 2023-11-17 NOTE — PROGRESS NOTES
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 cells.  This occurred through Dr. Mckenzie 5/30/2023-EGD and laparoscopy.  Study revealed through a retroflexed view the fundus and ulcerated tumor located along the lesser curvature immediately distal to the GE junction, 4 cm distal to the GE junction.  Washings were negative for definitive for malignancy.  It was concluded that he would need an esophagogastrectomy and the case was discussed with  who favored upfront chemotherapy given neoadjuvantly.  The patient was seen 6/5/2023 and it was determined that endoscopic ultrasound be obtained and if this revealed T1 tumor and he be a good candidate for initial surgery.  The patient is to be seen by Dr. Hodge concerning this.  The patient is seen on 6/13/2023 in office.  We have discussed, in detail with his son present, his current status including the need for EUS, subsequent port placement as we determine his tumor stage.  Available, currently, his PET/CT performed 5/3/2023 showing low-level activity in the short segment of the lesser curvature at the site of his gastric malignancy, otherwise negative with very low-level metabolic activity in the abdomen and shotty nodes in the axilla and both groin regions.  The patient was now scheduled for EUS next week with results pending.  Pending the T stage he could well be a candidate for neoadjuvant chemotherapy but we find now that his  peripheral neuropathy is both severe and longstanding and thus the use of certain chemotherapy regimens such as FLOT (frequently used) is of concern since peripheral neuropathy risk could be quite high.  Alternative FOLFOX could be utilized with oxaliplatin dose adjusted pending his degree of neuropathic symptoms.  He returned to laboratory undergoing exams including normal B12, ferritin, iron profile 10% saturation, CEA 4.56, hemoglobin A1c of 6.50.  Additionally underwent teaching for FOLFOX chemotherapy.  Dr. Varela has contacted me indicating that EUS is scheduled 6/21/2023 and he will schedule PowerPort 6/23/2023.  The patient proceeded to PET/CT performed 5/3/2023 showing low-level activity in the short segment of the lesser curvature at the site of his gastric malignancy, otherwise negative with very low-level metabolic activity in the abdomen and shotty nodes in the axilla and both groin regions.  The patient continued his staging undergoing EGD and EUS 6/21/2023 demonstrated gastric ulceration/mass in the mid to proximal portion lesser curvature of the stomach measuring 5-7 cm, EUS with gastric mass and invasion of muscularis propria with 1 area penetrating through the muscularis propria into the adventitia-likely T2 lesion, 3 small lymph nodes celiac axis/gastrohepatic region not overtly hyperechoic largest measuring 6 mm, normal pancreatic EUS and fatty liver with no metastatic lesions.  FNB x2 performed the largest lymph node negative cytologically.  He had seen  6/21/2023 with plans to approach a T2 lesion or higher with chemotherapy neoadjuvant adjuvant settings-likely to require total gastrectomy, partial Jasiel Arnulfo esophagectomy and Steffany-en-Y reconstruction.  Seen back in office 6/27/2023 to proceed with chemotherapy-FOLFOX.  We discussed that he would be offered 4 cycles preoperative and 4 cycles postoperative pending tolerance.  His findings and course will be discussed with his surgeons as we  proceed.  Patient reviewed back today following first cycle of FOLFOX.  He reports he is feeling well and has remained asymptomatic.  He has not noted any increased neuropathy from his baseline.  He denies any nausea, vomiting, increased arthralgias, cold sensation, or diarrhea.  He did have some mild constipation initially that resolved on its own.  He has remained active and continues to eat and drink well.  He does not feel that he needs any IV fluids today.  The patient is seen 7/11/2023 for his second cycle of FOLFOX chemotherapy.  7/25/2023 cycle 3 neoadjuvant FOLFOX (out of 4 preoperative cycles planned).  Tolerating well.  We went on to proceed with cycle 3 FOLFOX and plans for the patient to be seen 2 weeks later.  In the meantime he has been seen by Dr. Varela anticipating surgical resection to require total gastrectomy, partial Jasiel Arnulfo esophagectomy and Steffany-en-Y reconstruction.  Currently the patient is scheduled for a PET 8/14/2023 and review by Dr. Varela 8/17/2023.  The patient is next seen 8/9/2023.  He is ready to proceed with his fourth cycle of therapy and subsequent PET/CT.  His case has been discussed with  as well as to his plans described above.  The patient proceeded to PET/CT 8/14/2023 to document resolution of focal hypermetabolism along the gastric lesser curvature, subcentimeter upper abdominal retroperitoneal lymphadenopathy was too small to characterize as were unchanged 5 to 6 mm right lower lobe solid pulmonary nodules.  The patient was admitted 9/13-9/18 undergoing Noble total gastrectomy with Steffany-en-Y, esophagojejunostomy and jejunal feeding tube placement.  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  Final stage: [id number].  The patient was seen back by Dr. Mckenzie 9/28/2023 with his incision healing and staples removed, J-tube had not been used and was removed patient was doing well enough to be referred back to proceed with his next portion of therapy-4 cycles of FOLFOX.  Patient seen 10/17/2023.  He is seen formally 10/16/2023.  Fortunately he is doing exceedingly well and, while he is working to manage his new diet, is confirmed with Dr. Mckenzie that we can proceed with her second phase of chemotherapy with 4 additional cycles of FOLFOX.  10/24/2023: Cycle 5 FOLFOX.  Was previously struggling with constipation, for the last week has had diarrhea up to 8 episodes daily.  Has not taken any antidiarrheals, reports he was unsure if he could take these following surgery.  Weight is down approximately 10 pounds since his last visit.  Admits he is not eating much, as he typically has diarrhea after eating.  Also admits he is not drinking a lot of fluid, about 20 ounces daily.  He is scheduled to see [facility] dietitian following our visit today who plans to review dietary recommendations.  Recommended he start Imodium as needed for diarrhea.  If no improvement with Imodium plan to start Lomotil.  We will proceed with 500 cc normal saline prior to chemotherapy.   Magnesium checked today and normal at 1.9.  10/31/2023: Diarrhea is significantly improved with Imodium use.  Typically taking 1 Imodium tablet every 3-4 days.  Feels his fluid intake has been doing good.  Proceed with 500 cc normal saline today while we await CMP results.  Creatinine normal, patient will not receive any further fluids.  11/7/2023: cycle 6 FOLFOX today.  Tolerating well.  11/15/2023: Seen for triage due to concerns for  dehydration.  Creatinine 0.83, BUN 17.  Received 500 cc normal saline.  11/21/2023 C7 FOLFOX.       *Venous access  patient underwent Mediport placement 6/23/2023   Patient's port evaluated 7/10/2023 having been accessed for week after last visit.  Fortunately the area in question is not significantly inflamed nor fluctuant.  We have deaccessed, cleaned the site, reaccessed and plan to proceed with blood cultures pending.  Size reevaluated 8/8/2023 without additional information    *Hypertension  Patient admits he has been noncompliant with his medications, does not take them regularly.    Blood pressure 10/26/2023 185/102.  He is fortunately asymptomatic.  Strongly encouraged him to resume blood pressure medications and close monitoring of blood pressure at home, reports he is agreeable with this.   10/31/2023: Blood pressure 176/102.  Has been compliant with his blood pressure medication over the last week.  Asymptomatic.  Checking his blood pressure at home and reports it typically runs 150s over 90s.  He plans to follow-up with his PCP in regards to blood pressure management.  Did advise if he becomes symptomatic he should present to the ER.  11/7/2023: BP today 167/91.  Recently started on clonidine patch.  Having some neck stiffness from the clonidine patch.  Follows again with his PCP Thursday for further management.  11/15/2023: Pressure remains elevated 160/88.  Denies any chest pain or shortness of breath.  500 mils of normal saline given today for hydration while CMP pending.  11/21/2023: /98.  Asymptomatic.  Continues with clonidine patch.    *Hypokalemia  Potassium 2.9.  Patient again admits he has not been taking his oral potassium regularly.  Encouraged him to continue taking 20 mEq twice daily.  Also encouraged increasing potassium in his diet.  11/15/2023: Potassium is 3.4 today.  He has potassium chloride 20 mill equivalents he has been asked to take twice daily and encouraged to increase his  potassium intake in his diet.  Does admit he has not been taking this consistently but does plan to do so.  We will continue to monitor.   11/21/2023: Potassium today still low at 3.3.  He again admits he is not taking his potassium replacement.  He will be provided with a handout on high potassium foods, encouraged him to increase his dietary potassium if he will not take oral potassium supplement.    *Weight loss  11/15/2023: Patient continues to struggle with his appetite and has been losing weight.    Enterade provided to patient to start in hopes of reducing GI symptoms related to chemotherapy.  11/21/2023: Weight improved 174 pounds.  Diarrhea has improved with Enterade.  Continue follow up with oncology dietician.    PLAN:   Proceed with cycle 7 FOLFOX.  Increase dietary potassium.  Provided with handout on high potassium foods.  Continue follow-up with oncology dietitian.  Continue Imodium as needed for diarrhea  Continue current hypertensive regimen and follow-up with PCP.  Return in 1 week for NP follow-up and possible IV fluids.  Return in 2 weeks for MD follow-up and cycle 8 FOLFOX.  Plans are to proceed with 4 total additional treatments    Patient remains on high risk medication and requires close monitoring for toxicity.    Ngozi Elizabeth, APRN  11/21/23

## 2023-11-21 ENCOUNTER — INFUSION (OUTPATIENT)
Dept: ONCOLOGY | Facility: HOSPITAL | Age: 73
End: 2023-11-21
Payer: MEDICARE

## 2023-11-21 ENCOUNTER — OFFICE VISIT (OUTPATIENT)
Dept: ONCOLOGY | Facility: CLINIC | Age: 73
End: 2023-11-21
Payer: MEDICARE

## 2023-11-21 VITALS
DIASTOLIC BLOOD PRESSURE: 98 MMHG | BODY MASS INDEX: 26.45 KG/M2 | RESPIRATION RATE: 16 BRPM | TEMPERATURE: 97.8 F | SYSTOLIC BLOOD PRESSURE: 165 MMHG | HEIGHT: 68 IN | OXYGEN SATURATION: 98 % | WEIGHT: 174.5 LBS | HEART RATE: 68 BPM

## 2023-11-21 DIAGNOSIS — C16.9 GASTRIC ADENOCARCINOMA: Primary | ICD-10-CM

## 2023-11-21 DIAGNOSIS — R63.4 UNINTENTIONAL WEIGHT LOSS: ICD-10-CM

## 2023-11-21 DIAGNOSIS — Z79.899 HIGH RISK MEDICATION USE: ICD-10-CM

## 2023-11-21 DIAGNOSIS — C16.9 GASTRIC ADENOCARCINOMA: ICD-10-CM

## 2023-11-21 LAB
ALBUMIN SERPL-MCNC: 3.8 G/DL (ref 3.5–5.2)
ALBUMIN/GLOB SERPL: 1.6 G/DL
ALP SERPL-CCNC: 86 U/L (ref 39–117)
ALT SERPL W P-5'-P-CCNC: 21 U/L (ref 1–41)
ANION GAP SERPL CALCULATED.3IONS-SCNC: 9.1 MMOL/L (ref 5–15)
AST SERPL-CCNC: 23 U/L (ref 1–40)
BASOPHILS # BLD AUTO: 0.02 10*3/MM3 (ref 0–0.2)
BASOPHILS NFR BLD AUTO: 0.5 % (ref 0–1.5)
BILIRUB SERPL-MCNC: 0.5 MG/DL (ref 0–1.2)
BUN SERPL-MCNC: 13 MG/DL (ref 8–23)
BUN/CREAT SERPL: 14.9 (ref 7–25)
CALCIUM SPEC-SCNC: 8.9 MG/DL (ref 8.6–10.5)
CHLORIDE SERPL-SCNC: 108 MMOL/L (ref 98–107)
CO2 SERPL-SCNC: 25.9 MMOL/L (ref 22–29)
CREAT SERPL-MCNC: 0.87 MG/DL (ref 0.76–1.27)
DEPRECATED RDW RBC AUTO: 48.2 FL (ref 37–54)
EGFRCR SERPLBLD CKD-EPI 2021: 91.7 ML/MIN/1.73
EOSINOPHIL # BLD AUTO: 0.09 10*3/MM3 (ref 0–0.4)
EOSINOPHIL NFR BLD AUTO: 2.3 % (ref 0.3–6.2)
ERYTHROCYTE [DISTWIDTH] IN BLOOD BY AUTOMATED COUNT: 15.3 % (ref 12.3–15.4)
GLOBULIN UR ELPH-MCNC: 2.4 GM/DL
GLUCOSE SERPL-MCNC: 93 MG/DL (ref 65–99)
HCT VFR BLD AUTO: 36.5 % (ref 37.5–51)
HGB BLD-MCNC: 12 G/DL (ref 13–17.7)
IMM GRANULOCYTES # BLD AUTO: 0.01 10*3/MM3 (ref 0–0.05)
IMM GRANULOCYTES NFR BLD AUTO: 0.3 % (ref 0–0.5)
LYMPHOCYTES # BLD AUTO: 1.57 10*3/MM3 (ref 0.7–3.1)
LYMPHOCYTES NFR BLD AUTO: 39.6 % (ref 19.6–45.3)
MCH RBC QN AUTO: 29.3 PG (ref 26.6–33)
MCHC RBC AUTO-ENTMCNC: 32.9 G/DL (ref 31.5–35.7)
MCV RBC AUTO: 89 FL (ref 79–97)
MONOCYTES # BLD AUTO: 0.48 10*3/MM3 (ref 0.1–0.9)
MONOCYTES NFR BLD AUTO: 12.1 % (ref 5–12)
NEUTROPHILS NFR BLD AUTO: 1.79 10*3/MM3 (ref 1.7–7)
NEUTROPHILS NFR BLD AUTO: 45.2 % (ref 42.7–76)
NRBC BLD AUTO-RTO: 0 /100 WBC (ref 0–0.2)
PLATELET # BLD AUTO: 134 10*3/MM3 (ref 140–450)
PMV BLD AUTO: 10.3 FL (ref 6–12)
POTASSIUM SERPL-SCNC: 3.3 MMOL/L (ref 3.5–5.2)
PROT SERPL-MCNC: 6.2 G/DL (ref 6–8.5)
RBC # BLD AUTO: 4.1 10*6/MM3 (ref 4.14–5.8)
SODIUM SERPL-SCNC: 143 MMOL/L (ref 136–145)
WBC NRBC COR # BLD AUTO: 3.96 10*3/MM3 (ref 3.4–10.8)

## 2023-11-21 PROCEDURE — 85025 COMPLETE CBC W/AUTO DIFF WBC: CPT | Performed by: INTERNAL MEDICINE

## 2023-11-21 PROCEDURE — 96415 CHEMO IV INFUSION ADDL HR: CPT

## 2023-11-21 PROCEDURE — 96411 CHEMO IV PUSH ADDL DRUG: CPT

## 2023-11-21 PROCEDURE — 25010000002 LEUCOVORIN CALCIUM PER 50 MG: Performed by: NURSE PRACTITIONER

## 2023-11-21 PROCEDURE — 25010000002 FLUOROURACIL PER 500 MG: Performed by: NURSE PRACTITIONER

## 2023-11-21 PROCEDURE — 80053 COMPREHEN METABOLIC PANEL: CPT | Performed by: INTERNAL MEDICINE

## 2023-11-21 PROCEDURE — 96416 CHEMO PROLONG INFUSE W/PUMP: CPT

## 2023-11-21 PROCEDURE — 25010000002 PALONOSETRON PER 25 MCG: Performed by: NURSE PRACTITIONER

## 2023-11-21 PROCEDURE — 25010000002 DEXAMETHASONE SODIUM PHOSPHATE 100 MG/10ML SOLUTION: Performed by: NURSE PRACTITIONER

## 2023-11-21 PROCEDURE — 96367 TX/PROPH/DG ADDL SEQ IV INF: CPT

## 2023-11-21 PROCEDURE — 25810000003 SODIUM CHLORIDE 0.9 % SOLUTION 250 ML FLEX CONT: Performed by: NURSE PRACTITIONER

## 2023-11-21 PROCEDURE — 25010000002 OXALIPLATIN PER 0.5 MG: Performed by: NURSE PRACTITIONER

## 2023-11-21 PROCEDURE — 96375 TX/PRO/DX INJ NEW DRUG ADDON: CPT

## 2023-11-21 PROCEDURE — 96368 THER/DIAG CONCURRENT INF: CPT

## 2023-11-21 PROCEDURE — G0498 CHEMO EXTEND IV INFUS W/PUMP: HCPCS

## 2023-11-21 PROCEDURE — 0 DEXTROSE 5 % SOLUTION: Performed by: NURSE PRACTITIONER

## 2023-11-21 PROCEDURE — 25010000002 FOSAPREPITANT PER 1 MG: Performed by: NURSE PRACTITIONER

## 2023-11-21 PROCEDURE — 0 DEXTROSE 5 % SOLUTION 250 ML FLEX CONT: Performed by: NURSE PRACTITIONER

## 2023-11-21 PROCEDURE — 96413 CHEMO IV INFUSION 1 HR: CPT

## 2023-11-21 RX ORDER — PALONOSETRON 0.05 MG/ML
0.25 INJECTION, SOLUTION INTRAVENOUS ONCE
Status: CANCELLED | OUTPATIENT
Start: 2023-11-21

## 2023-11-21 RX ORDER — DEXTROSE MONOHYDRATE 50 MG/ML
250 INJECTION, SOLUTION INTRAVENOUS ONCE
Status: CANCELLED | OUTPATIENT
Start: 2023-11-21

## 2023-11-21 RX ORDER — PALONOSETRON 0.05 MG/ML
0.25 INJECTION, SOLUTION INTRAVENOUS ONCE
Status: COMPLETED | OUTPATIENT
Start: 2023-11-21 | End: 2023-11-21

## 2023-11-21 RX ORDER — DEXTROSE MONOHYDRATE 50 MG/ML
250 INJECTION, SOLUTION INTRAVENOUS ONCE
Status: COMPLETED | OUTPATIENT
Start: 2023-11-21 | End: 2023-11-21

## 2023-11-21 RX ORDER — FAMOTIDINE 10 MG/ML
20 INJECTION, SOLUTION INTRAVENOUS AS NEEDED
Status: CANCELLED | OUTPATIENT
Start: 2023-11-21

## 2023-11-21 RX ORDER — FLUOROURACIL 50 MG/ML
400 INJECTION, SOLUTION INTRAVENOUS ONCE
Status: CANCELLED | OUTPATIENT
Start: 2023-11-21

## 2023-11-21 RX ORDER — DIPHENHYDRAMINE HYDROCHLORIDE 50 MG/ML
50 INJECTION INTRAMUSCULAR; INTRAVENOUS AS NEEDED
Status: CANCELLED | OUTPATIENT
Start: 2023-11-21

## 2023-11-21 RX ORDER — FLUOROURACIL 50 MG/ML
400 INJECTION, SOLUTION INTRAVENOUS ONCE
Status: COMPLETED | OUTPATIENT
Start: 2023-11-21 | End: 2023-11-21

## 2023-11-21 RX ADMIN — PALONOSETRON 0.25 MG: 0.05 INJECTION, SOLUTION INTRAVENOUS at 10:17

## 2023-11-21 RX ADMIN — DEXAMETHASONE SODIUM PHOSPHATE 12 MG: 10 INJECTION, SOLUTION INTRAMUSCULAR; INTRAVENOUS at 10:18

## 2023-11-21 RX ADMIN — LEUCOVORIN CALCIUM 780 MG: 350 INJECTION, POWDER, LYOPHILIZED, FOR SOLUTION INTRAMUSCULAR; INTRAVENOUS at 11:23

## 2023-11-21 RX ADMIN — DEXTROSE MONOHYDRATE 250 ML: 50 INJECTION, SOLUTION INTRAVENOUS at 10:00

## 2023-11-21 RX ADMIN — OXALIPLATIN 165 MG: 5 INJECTION, SOLUTION INTRAVENOUS at 11:23

## 2023-11-21 RX ADMIN — FOSAPREPITANT 100 ML: 150 INJECTION, POWDER, LYOPHILIZED, FOR SOLUTION INTRAVENOUS at 10:37

## 2023-11-21 RX ADMIN — FLUOROURACIL 4660 MG: 50 INJECTION, SOLUTION INTRAVENOUS at 13:33

## 2023-11-21 RX ADMIN — FLUOROURACIL 780 MG: 50 INJECTION, SOLUTION INTRAVENOUS at 13:27

## 2023-11-21 NOTE — NURSING NOTE
Patient to infusion room after BHARAT appointment. Patient states he is feeling much better this week. Patient states he enjoyed the enterade and feels like it really helped. Provided patient with 12 more bottles under discression of BHARAT Jones and TRESSA Frias.    Patient states he hasn't been taking his potassium supplement. Per BHARAT Melvin patient needs to resume taking it once per day. Also provided patient with another handout on high potassium content foods and educated on importance of taking his potassium. Patient verbalizes understanding and states he will resume it.     Patient tolerated treatment without complaints. Follow up appointments reviewed. Patient is instructed to call the office with any concerns or new symptoms prior to next visit. Patient verbalized understanding and discharged in stable condition.

## 2023-11-22 ENCOUNTER — TELEPHONE (OUTPATIENT)
Dept: ONCOLOGY | Facility: CLINIC | Age: 73
End: 2023-11-22
Payer: MEDICARE

## 2023-11-22 NOTE — TELEPHONE ENCOUNTER
Provider: Dr. Ludwig  Caller: Mark Ken  Relationship to Patient: Self  Call Back Phone Number: [phone]  Reason for Call: Pt asking about appt want to know if he can go closer to home

## 2023-11-23 ENCOUNTER — INFUSION (OUTPATIENT)
Dept: ONCOLOGY | Facility: HOSPITAL | Age: 73
End: 2023-11-23
Payer: MEDICARE

## 2023-11-23 DIAGNOSIS — C16.9 GASTRIC ADENOCARCINOMA: Primary | ICD-10-CM

## 2023-11-23 DIAGNOSIS — Z45.2 FITTING AND ADJUSTMENT OF VASCULAR CATHETER: ICD-10-CM

## 2023-11-23 PROCEDURE — 96523 IRRIG DRUG DELIVERY DEVICE: CPT

## 2023-11-23 PROCEDURE — 25010000002 HEPARIN LOCK FLUSH PER 10 UNITS: Performed by: INTERNAL MEDICINE

## 2023-11-23 RX ORDER — HEPARIN SODIUM (PORCINE) LOCK FLUSH IV SOLN 100 UNIT/ML 100 UNIT/ML
500 SOLUTION INTRAVENOUS AS NEEDED
OUTPATIENT
Start: 2023-11-23

## 2023-11-23 RX ORDER — SODIUM CHLORIDE 0.9 % (FLUSH) 0.9 %
10 SYRINGE (ML) INJECTION AS NEEDED
OUTPATIENT
Start: 2023-11-23

## 2023-11-23 RX ORDER — HEPARIN SODIUM (PORCINE) LOCK FLUSH IV SOLN 100 UNIT/ML 100 UNIT/ML
500 SOLUTION INTRAVENOUS AS NEEDED
Status: DISCONTINUED | OUTPATIENT
Start: 2023-11-23 | End: 2023-11-23 | Stop reason: HOSPADM

## 2023-11-23 RX ORDER — SODIUM CHLORIDE 0.9 % (FLUSH) 0.9 %
10 SYRINGE (ML) INJECTION AS NEEDED
Status: DISCONTINUED | OUTPATIENT
Start: 2023-11-23 | End: 2023-11-23 | Stop reason: HOSPADM

## 2023-11-23 RX ADMIN — Medication 10 ML: at 11:00

## 2023-11-23 RX ADMIN — HEPARIN 500 UNITS: 100 SYRINGE at 11:00

## 2023-11-27 NOTE — PROGRESS NOTES
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 gastrohepatic ligament lymphadenopathy concerning metastatic disease but no clear evidence of distant metastatic disease.  The patient is contacted by telephone 5/18/2023 and we plan to proceed with PET/CT next available, surgical assessment and follow-up in in CBC office subsequently.  The patient was more appropriately directed to general surgery initially and seen 5/26/2023 with consideration that he undergo peritoneal washing to evaluate for malignant cells.  This occurred through Dr. Mckenzie 5/30/2023-EGD and laparoscopy.  Study revealed through a retroflexed view the fundus and ulcerated tumor located along the lesser curvature immediately distal to the GE junction, 4 cm distal to the GE junction.  Washings were negative for definitive for malignancy.  It was concluded that he would need an esophagogastrectomy and the case was discussed with  who favored upfront chemotherapy given neoadjuvantly.  The patient was seen 6/5/2023 and it was determined that endoscopic ultrasound be obtained and if this revealed T1 tumor and he be a good candidate for initial surgery.  The patient is to be seen by Dr. Hodge concerning this.  The patient is seen on 6/13/2023 in office.  We have discussed, in detail with his son present, his current status including the need for EUS, subsequent port placement as we determine his tumor stage.  Available,  currently, his PET/CT performed 5/3/2023 showing low-level activity in the short segment of the lesser curvature at the site of his gastric malignancy, otherwise negative with very low-level metabolic activity in the abdomen and shotty nodes in the axilla and both groin regions.  The patient was now scheduled for EUS next week with results pending.  Pending the T stage he could well be a candidate for neoadjuvant chemotherapy but we find now that his peripheral neuropathy is both severe and longstanding and thus the use of certain chemotherapy regimens such as FLOT (frequently used) is of concern since peripheral neuropathy risk could be quite high.  Alternative FOLFOX could be utilized with oxaliplatin dose adjusted pending his degree of neuropathic symptoms.  He returned to laboratory undergoing exams including normal B12, ferritin, iron profile 10% saturation, CEA 4.56, hemoglobin A1c of 6.50.  Additionally underwent teaching for FOLFOX chemotherapy.  Dr. Varela has contacted me indicating that EUS is scheduled 6/21/2023 and he will schedule PowerPort 6/23/2023.  The patient proceeded to PET/CT performed 5/3/2023 showing low-level activity in the short segment of the lesser curvature at the site of his gastric malignancy, otherwise negative with very low-level metabolic activity in the abdomen and shotty nodes in the axilla and both groin regions.  The patient continued his staging undergoing EGD and EUS 6/21/2023 demonstrated gastric ulceration/mass in the mid to proximal portion lesser curvature of the stomach measuring 5-7 cm, EUS with gastric mass and invasion of muscularis propria with 1 area penetrating through the muscularis propria into the adventitia-likely T2 lesion, 3 small lymph nodes celiac axis/gastrohepatic region not overtly hyperechoic largest measuring 6 mm, normal pancreatic EUS and fatty liver with no metastatic lesions.  FNB x2 performed the largest lymph node negative cytologically.  He had  seen  6/21/2023 with plans to approach a T2 lesion or higher with chemotherapy neoadjuvant adjuvant settings-likely to require total gastrectomy, partial [place] Arnulfo esophagectomy and Steffany-en-Y reconstruction.  Seen back in office 6/27/2023 to proceed with chemotherapy-FOLFOX.  We discussed that he would be offered 4 cycles preoperative and 4 cycles postoperative pending tolerance.  His findings and course will be discussed with his surgeons as we proceed.  Patient reviewed back today following first cycle of FOLFOX.  He reports he is feeling well and has remained asymptomatic.  He has not noted any increased neuropathy from his baseline.  He denies any nausea, vomiting, increased arthralgias, cold sensation, or diarrhea.  He did have some mild constipation initially that resolved on its own.  He has remained active and continues to eat and drink well.  He does not feel that he needs any IV fluids today.  The patient is seen 7/11/2023 for his second cycle of FOLFOX chemotherapy.  7/25/2023 cycle 3 neoadjuvant FOLFOX (out of 4 preoperative cycles planned).  Tolerating well.  We went on to proceed with cycle 3 FOLFOX and plans for the patient to be seen 2 weeks later.  In the meantime he has been seen by Dr. Varela anticipating surgical resection to require total gastrectomy, partial Jasiel Arnulfo esophagectomy and Steffany-en-Y reconstruction.  Currently the patient is scheduled for a PET 8/14/2023 and review by Dr. Varela 8/17/2023.  The patient is next seen 8/9/2023.  He is ready to proceed with his fourth cycle of therapy and subsequent PET/CT.  His case has been discussed with  as well as to his plans described above.  The patient proceeded to PET/CT 8/14/2023 to document resolution of focal hypermetabolism along the gastric lesser curvature, subcentimeter upper abdominal retroperitoneal lymphadenopathy was too small to characterize as were unchanged 5 to 6 mm right lower lobe solid pulmonary nodules.  The patient  was admitted 9/13-9/18 undergoing Noble total gastrectomy with Steffany-en-Y, esophagojejunostomy and jejunal feeding tube placement.  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  Final stage: [id number].  The patient was seen back by Dr. Mckenzie 9/28/2023 with his incision healing and staples removed, J-tube had not been used and was removed patient was doing well enough to be referred back to proceed with his next portion of therapy-4 cycles of FOLFOX.  Patient seen 10/17/2023.  He is seen formally 10/16/2023.  Fortunately he is doing exceedingly well and, while he is working to manage his new diet, is confirmed with Dr. Mckenzie that we can proceed with her second phase of chemotherapy with 4 additional cycles of FOLFOX.  10/24/2023: Cycle 5 FOLFOX.  Was previously struggling with constipation, for the last week has had diarrhea up to 8 episodes daily.  Has not taken any antidiarrheals, reports he was unsure if he could take these following surgery.  Weight is down approximately 10 pounds since his last visit.  Admits he is not eating much, as he typically has diarrhea after eating.  Also admits he is not drinking a lot of fluid, about 20 ounces daily.  He is scheduled to see dee Friasitian following our visit today who plans to review dietary recommendations.  Recommended he start Imodium as needed for diarrhea.  If no improvement with Imodium plan to start Lomotil.  We will proceed with 500 cc normal saline prior to chemotherapy.    Magnesium checked today and normal at 1.9.  10/31/2023: Diarrhea is significantly improved with Imodium use.  Typically taking 1 Imodium tablet every 3-4 days.  Feels his fluid intake has been doing good.  Proceed with 500 cc normal saline today while we await CMP results.  Creatinine normal, patient will not receive any further fluids.  11/7/2023: cycle 6 FOLFOX today.  Tolerating well.  11/15/2023: Seen for triage due to concerns for dehydration.  Creatinine 0.83, BUN 17.  Received 500 cc normal saline.  11/21/2023 C7 FOLFOX.     11/28/2023: 1 L IV normal saline today.  Patient feels improvement overall, especially related to fatigue after receiving fluids and would like to continue to do so today.    He is emotional today, feeling frustrated in regards to his eating restrictions.  This has been more difficult during the holidays.  We discussed getting set up with our supportive oncology team, and he is agreeable.  Referral will be placed today.    *Venous access  patient underwent Mediport placement 6/23/2023   Patient's port evaluated 7/10/2023 having been accessed for week after last visit.  Fortunately the area in question is not significantly inflamed nor fluctuant.  We have deaccessed, cleaned the site, reaccessed and plan to proceed with blood cultures pending.  Size reevaluated 8/8/2023 without additional information    *Hypertension  Patient admits he has been noncompliant with his medications, does not take them regularly.    Blood pressure 10/26/2023 185/102.  He is fortunately asymptomatic.  Strongly encouraged him to resume blood pressure medications and close monitoring of blood pressure at home, reports he is agreeable with this.   10/31/2023: Blood pressure 176/102.  Has been compliant with his blood pressure medication over the last week.  Asymptomatic.  Checking his blood pressure at home and reports it typically runs 150s over 90s.  He plans to follow-up with his PCP in regards to blood  pressure management.  Did advise if he becomes symptomatic he should present to the ER.  11/7/2023: BP today 167/91.  Recently started on clonidine patch.  Having some neck stiffness from the clonidine patch.  Follows again with his PCP Thursday for further management.  11/15/2023: Pressure remains elevated 160/88.  Denies any chest pain or shortness of breath.  500 mils of normal saline given today for hydration while CMP pending.  11/21/2023: /98.  Asymptomatic.  Continues with clonidine patch.  11/28/2023: 157/91.    *Hypokalemia  Potassium 2.9.  Patient again admits he has not been taking his oral potassium regularly.  Encouraged him to continue taking 20 mEq twice daily.  Also encouraged increasing potassium in his diet.  11/15/2023: Potassium is 3.4 today.  He has potassium chloride 20 mill equivalents he has been asked to take twice daily and encouraged to increase his potassium intake in his diet.  Does admit he has not been taking this consistently but does plan to do so.  We will continue to monitor.   11/21/2023: Potassium today still low at 3.3.  He again admits he is not taking his potassium replacement.  He will be provided with a handout on high potassium foods, encouraged him to increase his dietary potassium if he will not take oral potassium supplement.   11/28/2023: Potassium 3.7.    *Weight loss  11/15/2023: Patient continues to struggle with his appetite and has been losing weight.    Enterade provided to patient to start in hopes of reducing GI symptoms related to chemotherapy.  11/21/2023: Weight improved 174 pounds.  Diarrhea has improved with Enterade.  Continue follow up with oncology dietician.     PLAN:   1L IV normal saline today.   Referral to supportive oncology.  Continue increasing dietary potassium.  Continue follow-up with oncology dietitian.  Continue Imodium as needed for diarrhea  Continue current hypertensive regimen and follow-up with PCP.  Return in 1 week for MD  follow-up and cycle 8 FOLFOX (4th additional cycle).  Patient would like to be set up for IV fluids either at disconnect or 1 week after chemotherapy as he finds this very helpful for his fatigue.  Plans are to proceed with 4 total additional treatments    Patient remains on high risk medication and requires close monitoring for toxicity.    Ngozi Elizabeth, APRN  11/28/23

## 2023-11-28 ENCOUNTER — OFFICE VISIT (OUTPATIENT)
Dept: ONCOLOGY | Facility: CLINIC | Age: 73
End: 2023-11-28
Payer: MEDICARE

## 2023-11-28 ENCOUNTER — INFUSION (OUTPATIENT)
Dept: ONCOLOGY | Facility: HOSPITAL | Age: 73
End: 2023-11-28
Payer: MEDICARE

## 2023-11-28 VITALS
BODY MASS INDEX: 26.24 KG/M2 | DIASTOLIC BLOOD PRESSURE: 91 MMHG | HEIGHT: 68 IN | OXYGEN SATURATION: 99 % | SYSTOLIC BLOOD PRESSURE: 157 MMHG | RESPIRATION RATE: 16 BRPM | WEIGHT: 173.1 LBS | TEMPERATURE: 98.2 F | HEART RATE: 65 BPM

## 2023-11-28 DIAGNOSIS — C16.9 GASTRIC ADENOCARCINOMA: Primary | ICD-10-CM

## 2023-11-28 DIAGNOSIS — Z79.899 HIGH RISK MEDICATION USE: ICD-10-CM

## 2023-11-28 DIAGNOSIS — Z45.2 FITTING AND ADJUSTMENT OF VASCULAR CATHETER: Primary | ICD-10-CM

## 2023-11-28 LAB
ALBUMIN SERPL-MCNC: 3.8 G/DL (ref 3.5–5.2)
ALBUMIN/GLOB SERPL: 1.5 G/DL
ALP SERPL-CCNC: 89 U/L (ref 39–117)
ALT SERPL W P-5'-P-CCNC: 19 U/L (ref 1–41)
ANION GAP SERPL CALCULATED.3IONS-SCNC: 8.6 MMOL/L (ref 5–15)
AST SERPL-CCNC: 22 U/L (ref 1–40)
BASOPHILS # BLD AUTO: 0.01 10*3/MM3 (ref 0–0.2)
BASOPHILS NFR BLD AUTO: 0.4 % (ref 0–1.5)
BILIRUB SERPL-MCNC: 0.4 MG/DL (ref 0–1.2)
BUN SERPL-MCNC: 13 MG/DL (ref 8–23)
BUN/CREAT SERPL: 14.8 (ref 7–25)
CALCIUM SPEC-SCNC: 9.1 MG/DL (ref 8.6–10.5)
CHLORIDE SERPL-SCNC: 104 MMOL/L (ref 98–107)
CO2 SERPL-SCNC: 25.4 MMOL/L (ref 22–29)
CREAT SERPL-MCNC: 0.88 MG/DL (ref 0.76–1.27)
DEPRECATED RDW RBC AUTO: 49.9 FL (ref 37–54)
EGFRCR SERPLBLD CKD-EPI 2021: 91.4 ML/MIN/1.73
EOSINOPHIL # BLD AUTO: 0.11 10*3/MM3 (ref 0–0.4)
EOSINOPHIL NFR BLD AUTO: 4.2 % (ref 0.3–6.2)
ERYTHROCYTE [DISTWIDTH] IN BLOOD BY AUTOMATED COUNT: 15.4 % (ref 12.3–15.4)
GLOBULIN UR ELPH-MCNC: 2.6 GM/DL
GLUCOSE SERPL-MCNC: 110 MG/DL (ref 65–99)
HCT VFR BLD AUTO: 36.3 % (ref 37.5–51)
HGB BLD-MCNC: 11.8 G/DL (ref 13–17.7)
IMM GRANULOCYTES # BLD AUTO: 0.01 10*3/MM3 (ref 0–0.05)
IMM GRANULOCYTES NFR BLD AUTO: 0.4 % (ref 0–0.5)
LYMPHOCYTES # BLD AUTO: 1.03 10*3/MM3 (ref 0.7–3.1)
LYMPHOCYTES NFR BLD AUTO: 39.2 % (ref 19.6–45.3)
MCH RBC QN AUTO: 29.1 PG (ref 26.6–33)
MCHC RBC AUTO-ENTMCNC: 32.5 G/DL (ref 31.5–35.7)
MCV RBC AUTO: 89.4 FL (ref 79–97)
MONOCYTES # BLD AUTO: 0.28 10*3/MM3 (ref 0.1–0.9)
MONOCYTES NFR BLD AUTO: 10.6 % (ref 5–12)
NEUTROPHILS NFR BLD AUTO: 1.19 10*3/MM3 (ref 1.7–7)
NEUTROPHILS NFR BLD AUTO: 45.2 % (ref 42.7–76)
NRBC BLD AUTO-RTO: 0 /100 WBC (ref 0–0.2)
PLAT MORPH BLD: NORMAL
PLATELET # BLD AUTO: 109 10*3/MM3 (ref 140–450)
PMV BLD AUTO: 10.3 FL (ref 6–12)
POTASSIUM SERPL-SCNC: 3.7 MMOL/L (ref 3.5–5.2)
PROT SERPL-MCNC: 6.4 G/DL (ref 6–8.5)
RBC # BLD AUTO: 4.06 10*6/MM3 (ref 4.14–5.8)
RBC MORPH BLD: NORMAL
SODIUM SERPL-SCNC: 138 MMOL/L (ref 136–145)
WBC MORPH BLD: NORMAL
WBC NRBC COR # BLD AUTO: 2.63 10*3/MM3 (ref 3.4–10.8)

## 2023-11-28 PROCEDURE — 80053 COMPREHEN METABOLIC PANEL: CPT | Performed by: NURSE PRACTITIONER

## 2023-11-28 PROCEDURE — 25010000002 HEPARIN LOCK FLUSH PER 10 UNITS: Performed by: INTERNAL MEDICINE

## 2023-11-28 PROCEDURE — 85025 COMPLETE CBC W/AUTO DIFF WBC: CPT | Performed by: NURSE PRACTITIONER

## 2023-11-28 PROCEDURE — 96360 HYDRATION IV INFUSION INIT: CPT

## 2023-11-28 PROCEDURE — 85007 BL SMEAR W/DIFF WBC COUNT: CPT | Performed by: NURSE PRACTITIONER

## 2023-11-28 PROCEDURE — 25810000003 SODIUM CHLORIDE 0.9 % SOLUTION: Performed by: NURSE PRACTITIONER

## 2023-11-28 RX ORDER — SODIUM CHLORIDE 0.9 % (FLUSH) 0.9 %
10 SYRINGE (ML) INJECTION AS NEEDED
Status: DISCONTINUED | OUTPATIENT
Start: 2023-11-28 | End: 2023-11-28 | Stop reason: HOSPADM

## 2023-11-28 RX ORDER — SODIUM CHLORIDE 9 MG/ML
1000 INJECTION, SOLUTION INTRAVENOUS CONTINUOUS
Status: DISCONTINUED | OUTPATIENT
Start: 2023-11-28 | End: 2023-11-28 | Stop reason: HOSPADM

## 2023-11-28 RX ORDER — HEPARIN SODIUM (PORCINE) LOCK FLUSH IV SOLN 100 UNIT/ML 100 UNIT/ML
500 SOLUTION INTRAVENOUS AS NEEDED
Status: DISCONTINUED | OUTPATIENT
Start: 2023-11-28 | End: 2023-11-28 | Stop reason: HOSPADM

## 2023-11-28 RX ORDER — SODIUM CHLORIDE 0.9 % (FLUSH) 0.9 %
10 SYRINGE (ML) INJECTION AS NEEDED
OUTPATIENT
Start: 2023-11-28

## 2023-11-28 RX ORDER — HEPARIN SODIUM (PORCINE) LOCK FLUSH IV SOLN 100 UNIT/ML 100 UNIT/ML
500 SOLUTION INTRAVENOUS AS NEEDED
OUTPATIENT
Start: 2023-11-28

## 2023-11-28 RX ORDER — LISINOPRIL 40 MG/1
TABLET ORAL
COMMUNITY
Start: 2023-11-27

## 2023-11-28 RX ADMIN — Medication 10 ML: at 13:07

## 2023-11-28 RX ADMIN — SODIUM CHLORIDE 1000 ML: 9 INJECTION, SOLUTION INTRAVENOUS at 12:00

## 2023-11-28 RX ADMIN — Medication 500 UNITS: at 13:07

## 2023-12-01 ENCOUNTER — TELEPHONE (OUTPATIENT)
Dept: OTHER | Facility: HOSPITAL | Age: 73
End: 2023-12-01
Payer: MEDICARE

## 2023-12-01 NOTE — TELEPHONE ENCOUNTER
Oncology Social Work  Supportive Oncology Services    Referral received from BHARAT Jimenez to see for psychosocial support and community resources.  OSW called and spoke to patient. Explained role of OSW and how I can be of assistance.  He would like to meet with me,  Also interested in [facility] [employer] [facility] [employer].   Patient to check his schedule and will call me back to schedule with me.     Jessika Mota, WILLW, LCSW

## 2023-12-04 ENCOUNTER — SOCIAL WORK (OUTPATIENT)
Dept: PSYCHIATRY | Facility: HOSPITAL | Age: 73
End: 2023-12-04
Payer: MEDICARE

## 2023-12-04 NOTE — PROGRESS NOTES
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 thought to be gastritis though EGD was performed 4/27/2023 ultimately revealing an ulceration found on the posterior wall of the gastric body.  Biopsy was positive for poorly differentiated adenocarcinoma arising in a background of intestinal metaplasia and high-grade dysplasia, immunostain HER2 negative.  The patient's subsequent CT scan of chest and pelvis showed circumferential thickening of the lesser curvature of the stomach with a central area of ulceration and prominent gastrohepatic ligament lymphadenopathy concerning metastatic disease but no clear evidence of distant metastatic disease.  The patient is contacted by telephone 5/18/2023 and we plan to proceed with PET/CT next available, surgical assessment and follow-up in in CBC office subsequently.  The patient was more appropriately directed to general surgery initially and seen 5/26/2023 with consideration that he undergo peritoneal washing to evaluate for malignant cells.  This occurred through Dr. Mckenzie 5/30/2023-EGD and laparoscopy.  Study revealed through a retroflexed view the fundus and ulcerated tumor located along the lesser curvature immediately distal to the GE junction, 4 cm distal to the GE junction.  Washings were negative for definitive for malignancy.  It was concluded that he would need an esophagogastrectomy and the case was discussed with  who  favored upfront chemotherapy given neoadjuvantly.  The patient was seen 6/5/2023 and it was determined that endoscopic ultrasound be obtained and if this revealed T1 tumor and he be a good candidate for initial surgery.  The patient is to be seen by Dr. Hodge concerning this.  The patient is seen on 6/13/2023 in office.  We have discussed, in detail with his son present, his current status including the need for EUS, subsequent port placement as we determine his tumor stage.  Available, currently, his PET/CT performed 5/3/2023 showing low-level activity in the short segment of the lesser curvature at the site of his gastric malignancy, otherwise negative with very low-level metabolic activity in the abdomen and shotty nodes in the axilla and both groin regions.  The patient was now scheduled for EUS next week with results pending.  Pending the T stage he could well be a candidate for neoadjuvant chemotherapy but we find now that his peripheral neuropathy is both severe and longstanding and thus the use of certain chemotherapy regimens such as FLOT (frequently used) is of concern since peripheral neuropathy risk could be quite high.  Alternative FOLFOX could be utilized with oxaliplatin dose adjusted pending his degree of neuropathic symptoms.  He returned to laboratory undergoing exams including normal B12, ferritin, iron profile 10% saturation, CEA 4.56, hemoglobin A1c of 6.50.  Additionally underwent teaching for FOLFOX chemotherapy.  Dr. Varela has contacted me indicating that EUS is scheduled 6/21/2023 and he will schedule PowerPort 6/23/2023.  The patient proceeded to PET/CT performed 5/3/2023 showing low-level activity in the short segment of the lesser curvature at the site of his gastric malignancy, otherwise negative with very low-level metabolic activity in the abdomen and shotty nodes in the axilla and both groin regions.  The patient continued his staging undergoing EGD and EUS 6/21/2023 demonstrated  gastric ulceration/mass in the mid to proximal portion lesser curvature of the stomach measuring 5-7 cm, EUS with gastric mass and invasion of muscularis propria with 1 area penetrating through the muscularis propria into the adventitia-likely T2 lesion, 3 small lymph nodes celiac axis/gastrohepatic region not overtly hyperechoic largest measuring 6 mm, normal pancreatic EUS and fatty liver with no metastatic lesions.  FNB x2 performed the largest lymph node negative cytologically.  He had seen  6/21/2023 with plans to approach a T2 lesion or higher with chemotherapy neoadjuvant adjuvant settings-likely to require total gastrectomy, partial Jasiel Arnulfo esophagectomy and Steffany-en-Y reconstruction.  Seen back in office 6/27/2023 to proceed with chemotherapy-FOLFOX.  We discussed that he would be offered 4 cycles preoperative and 4 cycles postoperative pending tolerance.  His findings and course will be discussed with his surgeons as we proceed.  Patient reviewed back today following first cycle of FOLFOX.  He reports he is feeling well and has remained asymptomatic.  He has not noted any increased neuropathy from his baseline.  He denies any nausea, vomiting, increased arthralgias, cold sensation, or diarrhea.  He did have some mild constipation initially that resolved on its own.  He has remained active and continues to eat and drink well.  He does not feel that he needs any IV fluids today.  The patient is seen 7/11/2023 for his second cycle of FOLFOX chemotherapy.  7/25/2023 cycle 3 neoadjuvant FOLFOX (out of 4 preoperative cycles planned).  Tolerating well.  We went on to proceed with cycle 3 FOLFOX and plans for the patient to be seen 2 weeks later.  In the meantime he has been seen by Dr. Varela anticipating surgical resection to require total gastrectomy, partial [place] Arnulfo esophagectomy and Steffany-en-Y reconstruction.  Currently the patient is scheduled for a PET 8/14/2023 and review by Dr. Varela 8/17/2023.  The  patient is next seen 8/9/2023.  He is ready to proceed with his fourth cycle of therapy and subsequent PET/CT.  His case has been discussed with  as well as to his plans described above.  The patient proceeded to PET/CT 8/14/2023 to document resolution of focal hypermetabolism along the gastric lesser curvature, subcentimeter upper abdominal retroperitoneal lymphadenopathy was too small to characterize as were unchanged 5 to 6 mm right lower lobe solid pulmonary nodules.  The patient was admitted 9/13-9/18 undergoing Noble total gastrectomy with Steffany-en-Y, esophagojejunostomy and jejunal feeding tube placement.  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  Final stage: [id number].  The patient was seen back by Dr. Mckenzie 9/28/2023 with his incision healing and staples removed, J-tube had not been used and was removed patient was doing well enough to be referred back to proceed with his next portion of therapy-4 cycles of FOLFOX.  Patient seen 10/17/2023.  He is seen formally 10/16/2023.  Fortunately he is doing exceedingly well and, while he is working to manage his new diet, is confirmed with Dr. Mckenzie that we can proceed with her second phase of chemotherapy with 4 additional cycles of FOLFOX.  10/24/2023: Cycle 5 FOLFOX.  Was previously struggling with constipation, for the last week has had diarrhea up to 8 episodes daily.  Has not taken any antidiarrheals, reports he was unsure if he could take these following  surgery.  Weight is down approximately 10 pounds since his last visit.  Admits he is not eating much, as he typically has diarrhea after eating.  Also admits he is not drinking a lot of fluid, about 20 ounces daily.  He is scheduled to see [place] dietitian following our visit today who plans to review dietary recommendations.  Recommended he start Imodium as needed for diarrhea.  If no improvement with Imodium plan to start Lomotil.  We will proceed with 500 cc normal saline prior to chemotherapy.   Magnesium checked today and normal at 1.9.  10/31/2023: Diarrhea is significantly improved with Imodium use.  Typically taking 1 Imodium tablet every 3-4 days.  Feels his fluid intake has been doing good.  Proceed with 500 cc normal saline today while we await CMP results.  Creatinine normal, patient will not receive any further fluids.  11/7/2023: cycle 6 FOLFOX today.  Tolerating well.  11/15/2023: Seen for triage due to concerns for dehydration.  Creatinine 0.83, BUN 17.  Received 500 cc normal saline.  11/21/2023 C7 FOLFOX.     11/28/2023: 1 L IV normal saline today.  Patient feels improvement overall, especially related to fatigue after receiving fluids and would like to continue to do so today.    He is emotional today, feeling frustrated in regards to his eating restrictions.  This has been more difficult during the holidays.  We discussed getting set up with our supportive oncology team, and he is agreeable.  Referral will be placed today.  Patient is/12/5/2023 for fourth cycle of FOLFOX, improved performance status and mood.  Agrees to proceed with his final course of adjuvant therapy and subsequent repeat scans in 1 week.    *Venous access  patient underwent Mediport placement 6/23/2023   Patient's port evaluated 7/10/2023 having been accessed for week after last visit.  Fortunately the area in question is not significantly inflamed nor fluctuant.  We have deaccessed, cleaned the site, reaccessed and plan to  proceed with blood cultures pending.  Size reevaluated subsequent without additional inflammation    *Hypertension  Patient admits he has been noncompliant with his medications, does not take them regularly.    Blood pressure 10/26/2023 185/102.  He is fortunately asymptomatic.  Strongly encouraged him to resume blood pressure medications and close monitoring of blood pressure at home, reports he is agreeable with this.   10/31/2023: Blood pressure 176/102.  Has been compliant with his blood pressure medication over the last week.  Asymptomatic.  Checking his blood pressure at home and reports it typically runs 150s over 90s.  He plans to follow-up with his PCP in regards to blood pressure management.  Did advise if he becomes symptomatic he should present to the ER.  11/7/2023: BP today 167/91.  Recently started on clonidine patch.  Having some neck stiffness from the clonidine patch.  Follows again with his PCP Thursday for further management.  11/15/2023: Pressure remains elevated 160/88.  Denies any chest pain or shortness of breath.  500 mils of normal saline given today for hydration while CMP pending.  11/21/2023: /98.  Asymptomatic.  Continues with clonidine patch.  11/28/2023: 157/91.,  Continues treatment evaluated 12/5/2023    *Hypokalemia  Potassium 2.9.  Patient again admits he has not been taking his oral potassium regularly.  Encouraged him to continue taking 20 mEq twice daily.  Also encouraged increasing potassium in his diet.  11/15/2023: Potassium is 3.4 today.  He has potassium chloride 20 mill equivalents he has been asked to take twice daily and encouraged to increase his potassium intake in his diet.  Does admit he has not been taking this consistently but does plan to do so.  We will continue to monitor.   11/21/2023: Potassium today still low at 3.3.  He again admits he is not taking his potassium replacement.  He will be provided with a handout on high potassium foods, encouraged him  to increase his dietary potassium if he will not take oral potassium supplement.   11/28/2023: Potassium 3.7.  Repeat 12/5/2023 3.1, diet adjusted    *Weight loss  11/15/2023: Patient continues to struggle with his appetite and has been losing weight.    Enterade provided to patient to start in hopes of reducing GI symptoms related to chemotherapy.  11/21/2023: Weight improved 174 pounds.  Diarrhea has improved with Enterade.  Continue follow up with oncology dietician.     PLAN:   Proceed with fourth and final cycle of FOLFOX today  Additional liter of IV fluids a takedown this week  CT chest, abdomen, pelvis 1 week  Assessment MD 2 weeks for subsequent scheduling and surveillance.  Referral to supportive oncology.  Continue increasing dietary potassium.  Continue follow-up with oncology dietitian.  Continue Imodium as needed for diarrhea  Continue current hypertensive regimen and follow-up with PCP.  Patient remains on high risk medication and requires close monitoring for toxicity.    Mkiel Ludwig MD  12/05/23

## 2023-12-04 NOTE — PROGRESS NOTES
"Oncology Social Work  Supportive Oncology Services     Diagnosis: gastric cancer- siewert type III - -  Final stage: [id number].  Treatment:   Chemo - Surgery - Chemo .  Undergoing his last cycle of FOLFOX in near future.     Chief Complaint: adjustment, depressed mood.    Physical Concerns: none - active and independent with mobility and ADL's.  Possibly outpatient PT ? Or [facility] program?        Practical Problems: none reported        Emotional Concerns: patient reports feeling low, frustrated, lonely.  Patient has mental health hx and is currently on Cymbalta 60mg - this is managed by his PCP, Dr Phani Conn.  Discussed Behavioral Oncology.  Offered supportive therapy with OSW/ LCSW - he is interested.  Patient also reports low self esteem/ self worth.        Family Concerns: patient has a son and daughter in law who he is close with , along with grandchildren.        Spiritual Concerns: Was raised Anabaptist, unsure if he is still practicing.         Interventions:   OSW met with patient today. Reviewed OSW services and ways we can be of assistance.  Patient lives alone in [place], [place] - son and his family live nearby.  Patient interested in supportive therapy to process and work through the adjustment of his diagnosis and \" new\" normal and way of eating.  Pt with grief and loss of pre-cancer way of living and eating.     Referral also made to [employer] and [employer] - he is interested in increasing his social [place] and incorporating new activities into his life.     Patient to call OSW to schedule next appt.    Jessika Mota, WILLW, LCSW    "

## 2023-12-05 ENCOUNTER — INFUSION (OUTPATIENT)
Dept: ONCOLOGY | Facility: HOSPITAL | Age: 73
End: 2023-12-05
Payer: MEDICARE

## 2023-12-05 ENCOUNTER — OFFICE VISIT (OUTPATIENT)
Dept: ONCOLOGY | Facility: CLINIC | Age: 73
End: 2023-12-05
Payer: MEDICARE

## 2023-12-05 VITALS
SYSTOLIC BLOOD PRESSURE: 174 MMHG | TEMPERATURE: 97.5 F | WEIGHT: 166 LBS | BODY MASS INDEX: 25.16 KG/M2 | OXYGEN SATURATION: 97 % | DIASTOLIC BLOOD PRESSURE: 98 MMHG | RESPIRATION RATE: 18 BRPM | HEIGHT: 68 IN | HEART RATE: 80 BPM

## 2023-12-05 VITALS — DIASTOLIC BLOOD PRESSURE: 99 MMHG | HEART RATE: 56 BPM | SYSTOLIC BLOOD PRESSURE: 171 MMHG | OXYGEN SATURATION: 98 %

## 2023-12-05 DIAGNOSIS — C16.9 GASTRIC ADENOCARCINOMA: Primary | ICD-10-CM

## 2023-12-05 DIAGNOSIS — C16.0 MALIGNANT NEOPLASM OF CARDIA OF STOMACH: ICD-10-CM

## 2023-12-05 DIAGNOSIS — C16.9 GASTRIC ADENOCARCINOMA: ICD-10-CM

## 2023-12-05 LAB
ALBUMIN SERPL-MCNC: 4 G/DL (ref 3.5–5.2)
ALBUMIN/GLOB SERPL: 1.4 G/DL
ALP SERPL-CCNC: 104 U/L (ref 39–117)
ALT SERPL W P-5'-P-CCNC: 23 U/L (ref 1–41)
ANION GAP SERPL CALCULATED.3IONS-SCNC: 9.9 MMOL/L (ref 5–15)
AST SERPL-CCNC: 24 U/L (ref 1–40)
BASOPHILS # BLD AUTO: 0.03 10*3/MM3 (ref 0–0.2)
BASOPHILS NFR BLD AUTO: 0.5 % (ref 0–1.5)
BILIRUB SERPL-MCNC: 0.6 MG/DL (ref 0–1.2)
BUN SERPL-MCNC: 13 MG/DL (ref 8–23)
BUN/CREAT SERPL: 15.1 (ref 7–25)
CALCIUM SPEC-SCNC: 9.3 MG/DL (ref 8.6–10.5)
CHLORIDE SERPL-SCNC: 109 MMOL/L (ref 98–107)
CO2 SERPL-SCNC: 26.1 MMOL/L (ref 22–29)
CREAT SERPL-MCNC: 0.86 MG/DL (ref 0.76–1.27)
DEPRECATED RDW RBC AUTO: 53.7 FL (ref 37–54)
EGFRCR SERPLBLD CKD-EPI 2021: 92 ML/MIN/1.73
EOSINOPHIL # BLD AUTO: 0.06 10*3/MM3 (ref 0–0.4)
EOSINOPHIL NFR BLD AUTO: 1 % (ref 0.3–6.2)
ERYTHROCYTE [DISTWIDTH] IN BLOOD BY AUTOMATED COUNT: 16.9 % (ref 12.3–15.4)
GLOBULIN UR ELPH-MCNC: 2.8 GM/DL
GLUCOSE SERPL-MCNC: 97 MG/DL (ref 65–99)
HCT VFR BLD AUTO: 38.8 % (ref 37.5–51)
HGB BLD-MCNC: 12.9 G/DL (ref 13–17.7)
IMM GRANULOCYTES # BLD AUTO: 0.01 10*3/MM3 (ref 0–0.05)
IMM GRANULOCYTES NFR BLD AUTO: 0.2 % (ref 0–0.5)
LYMPHOCYTES # BLD AUTO: 1.89 10*3/MM3 (ref 0.7–3.1)
LYMPHOCYTES NFR BLD AUTO: 32.1 % (ref 19.6–45.3)
MCH RBC QN AUTO: 29.6 PG (ref 26.6–33)
MCHC RBC AUTO-ENTMCNC: 33.2 G/DL (ref 31.5–35.7)
MCV RBC AUTO: 89 FL (ref 79–97)
MONOCYTES # BLD AUTO: 0.86 10*3/MM3 (ref 0.1–0.9)
MONOCYTES NFR BLD AUTO: 14.6 % (ref 5–12)
NEUTROPHILS NFR BLD AUTO: 3.03 10*3/MM3 (ref 1.7–7)
NEUTROPHILS NFR BLD AUTO: 51.6 % (ref 42.7–76)
NRBC BLD AUTO-RTO: 0 /100 WBC (ref 0–0.2)
PLATELET # BLD AUTO: 129 10*3/MM3 (ref 140–450)
PMV BLD AUTO: 9.8 FL (ref 6–12)
POTASSIUM SERPL-SCNC: 3.1 MMOL/L (ref 3.5–5.2)
PROT SERPL-MCNC: 6.8 G/DL (ref 6–8.5)
RBC # BLD AUTO: 4.36 10*6/MM3 (ref 4.14–5.8)
SODIUM SERPL-SCNC: 145 MMOL/L (ref 136–145)
WBC NRBC COR # BLD AUTO: 5.88 10*3/MM3 (ref 3.4–10.8)

## 2023-12-05 PROCEDURE — 96411 CHEMO IV PUSH ADDL DRUG: CPT

## 2023-12-05 PROCEDURE — 25010000002 OXALIPLATIN PER 0.5 MG: Performed by: INTERNAL MEDICINE

## 2023-12-05 PROCEDURE — 25010000002 DEXAMETHASONE SODIUM PHOSPHATE 100 MG/10ML SOLUTION: Performed by: INTERNAL MEDICINE

## 2023-12-05 PROCEDURE — 0 DEXTROSE 5 % SOLUTION 250 ML FLEX CONT: Performed by: INTERNAL MEDICINE

## 2023-12-05 PROCEDURE — 96413 CHEMO IV INFUSION 1 HR: CPT

## 2023-12-05 PROCEDURE — 96367 TX/PROPH/DG ADDL SEQ IV INF: CPT

## 2023-12-05 PROCEDURE — 25010000002 PALONOSETRON PER 25 MCG: Performed by: INTERNAL MEDICINE

## 2023-12-05 PROCEDURE — 25810000003 SODIUM CHLORIDE 0.9 % SOLUTION 250 ML FLEX CONT: Performed by: INTERNAL MEDICINE

## 2023-12-05 PROCEDURE — 80053 COMPREHEN METABOLIC PANEL: CPT | Performed by: INTERNAL MEDICINE

## 2023-12-05 PROCEDURE — 85025 COMPLETE CBC W/AUTO DIFF WBC: CPT | Performed by: INTERNAL MEDICINE

## 2023-12-05 PROCEDURE — 25010000002 MEPERIDINE PER 100 MG

## 2023-12-05 PROCEDURE — 25010000002 PROCHLORPERAZINE 10 MG/2ML SOLUTION

## 2023-12-05 PROCEDURE — 0 DEXTROSE 5 % SOLUTION: Performed by: INTERNAL MEDICINE

## 2023-12-05 PROCEDURE — G0498 CHEMO EXTEND IV INFUS W/PUMP: HCPCS

## 2023-12-05 PROCEDURE — 25010000002 LEUCOVORIN CALCIUM PER 50 MG: Performed by: INTERNAL MEDICINE

## 2023-12-05 PROCEDURE — 25010000002 HYDROCORTISONE SOD SUC (PF) 100 MG RECONSTITUTED SOLUTION: Performed by: NURSE PRACTITIONER

## 2023-12-05 PROCEDURE — 96368 THER/DIAG CONCURRENT INF: CPT

## 2023-12-05 PROCEDURE — 25010000002 FOSAPREPITANT PER 1 MG: Performed by: INTERNAL MEDICINE

## 2023-12-05 PROCEDURE — 96375 TX/PRO/DX INJ NEW DRUG ADDON: CPT

## 2023-12-05 PROCEDURE — 25010000002 FLUOROURACIL PER 500 MG: Performed by: INTERNAL MEDICINE

## 2023-12-05 PROCEDURE — 96416 CHEMO PROLONG INFUSE W/PUMP: CPT

## 2023-12-05 RX ORDER — DEXTROSE MONOHYDRATE 50 MG/ML
250 INJECTION, SOLUTION INTRAVENOUS ONCE
Status: COMPLETED | OUTPATIENT
Start: 2023-12-05 | End: 2023-12-05

## 2023-12-05 RX ORDER — PALONOSETRON 0.05 MG/ML
0.25 INJECTION, SOLUTION INTRAVENOUS ONCE
Status: COMPLETED | OUTPATIENT
Start: 2023-12-05 | End: 2023-12-05

## 2023-12-05 RX ORDER — FAMOTIDINE 10 MG/ML
20 INJECTION, SOLUTION INTRAVENOUS AS NEEDED
Status: CANCELLED | OUTPATIENT
Start: 2023-12-05

## 2023-12-05 RX ORDER — FLUOROURACIL 50 MG/ML
400 INJECTION, SOLUTION INTRAVENOUS ONCE
Status: CANCELLED | OUTPATIENT
Start: 2023-12-05

## 2023-12-05 RX ORDER — MEPERIDINE HYDROCHLORIDE 25 MG/ML
INJECTION INTRAMUSCULAR; INTRAVENOUS; SUBCUTANEOUS
Status: COMPLETED
Start: 2023-12-05 | End: 2023-12-05

## 2023-12-05 RX ORDER — PALONOSETRON 0.05 MG/ML
0.25 INJECTION, SOLUTION INTRAVENOUS ONCE
Status: CANCELLED | OUTPATIENT
Start: 2023-12-05

## 2023-12-05 RX ORDER — DIPHENHYDRAMINE HYDROCHLORIDE 50 MG/ML
50 INJECTION INTRAMUSCULAR; INTRAVENOUS AS NEEDED
Status: CANCELLED | OUTPATIENT
Start: 2023-12-05

## 2023-12-05 RX ORDER — MEPERIDINE HYDROCHLORIDE 25 MG/ML
25 INJECTION INTRAMUSCULAR; INTRAVENOUS; SUBCUTANEOUS ONCE
Status: DISCONTINUED | OUTPATIENT
Start: 2023-12-05 | End: 2023-12-05 | Stop reason: HOSPADM

## 2023-12-05 RX ORDER — PROCHLORPERAZINE EDISYLATE 5 MG/ML
INJECTION INTRAMUSCULAR; INTRAVENOUS
Status: COMPLETED
Start: 2023-12-05 | End: 2023-12-05

## 2023-12-05 RX ORDER — DEXTROSE MONOHYDRATE 50 MG/ML
250 INJECTION, SOLUTION INTRAVENOUS ONCE
Status: CANCELLED | OUTPATIENT
Start: 2023-12-05

## 2023-12-05 RX ORDER — PROCHLORPERAZINE EDISYLATE 5 MG/ML
10 INJECTION INTRAMUSCULAR; INTRAVENOUS ONCE
Status: COMPLETED | OUTPATIENT
Start: 2023-12-05 | End: 2023-12-05

## 2023-12-05 RX ORDER — FAMOTIDINE 10 MG/ML
20 INJECTION, SOLUTION INTRAVENOUS ONCE
Status: COMPLETED | OUTPATIENT
Start: 2023-12-05 | End: 2023-12-05

## 2023-12-05 RX ORDER — FLUOROURACIL 50 MG/ML
400 INJECTION, SOLUTION INTRAVENOUS ONCE
Status: COMPLETED | OUTPATIENT
Start: 2023-12-05 | End: 2023-12-05

## 2023-12-05 RX ADMIN — FAMOTIDINE 20 MG: 10 INJECTION INTRAVENOUS at 11:45

## 2023-12-05 RX ADMIN — MEPERIDINE HYDROCHLORIDE 25 MG: 25 INJECTION, SOLUTION INTRAMUSCULAR; INTRAVENOUS; SUBCUTANEOUS at 11:54

## 2023-12-05 RX ADMIN — FLUOROURACIL 4660 MG: 50 INJECTION, SOLUTION INTRAVENOUS at 13:10

## 2023-12-05 RX ADMIN — PROCHLORPERAZINE EDISYLATE 10 MG: 5 INJECTION INTRAMUSCULAR; INTRAVENOUS at 11:55

## 2023-12-05 RX ADMIN — DEXAMETHASONE SODIUM PHOSPHATE 12 MG: 10 INJECTION, SOLUTION INTRAMUSCULAR; INTRAVENOUS at 10:32

## 2023-12-05 RX ADMIN — PALONOSETRON 0.25 MG: 0.05 INJECTION, SOLUTION INTRAVENOUS at 10:31

## 2023-12-05 RX ADMIN — FLUOROURACIL 780 MG: 50 INJECTION, SOLUTION INTRAVENOUS at 13:09

## 2023-12-05 RX ADMIN — OXALIPLATIN 165 MG: 5 INJECTION, SOLUTION INTRAVENOUS at 11:24

## 2023-12-05 RX ADMIN — FOSAPREPITANT 100 ML: 150 INJECTION, POWDER, LYOPHILIZED, FOR SOLUTION INTRAVENOUS at 10:48

## 2023-12-05 RX ADMIN — PROCHLORPERAZINE EDISYLATE 10 MG: 5 INJECTION, SOLUTION INTRAMUSCULAR; INTRAVENOUS at 11:55

## 2023-12-05 RX ADMIN — HYDROCORTISONE SODIUM SUCCINATE 100 MG: 100 INJECTION, POWDER, FOR SOLUTION INTRAMUSCULAR; INTRAVENOUS at 11:45

## 2023-12-05 RX ADMIN — LEUCOVORIN CALCIUM 780 MG: 350 INJECTION, POWDER, LYOPHILIZED, FOR SOLUTION INTRAMUSCULAR; INTRAVENOUS at 11:24

## 2023-12-05 RX ADMIN — DEXTROSE MONOHYDRATE 250 ML: 50 INJECTION, SOLUTION INTRAVENOUS at 10:26

## 2023-12-05 NOTE — NURSING NOTE
1139: Pt reported a sharp abdominal pain with nausea while RN was speaking with patient. Pt appeared Flushed in the face/head. Pt reports that the abdominal pain (sharp/cramp)came on abruptly and says it is a 10 out of 10.  Oxaliplatin and Leucovorin stopped with D5 saline flowing freely.     1141: BHARAT Jones, at bedside assessing pt. Pt denies CP, Headache, or SOA. BP: 216/109, HR: 69, 02 sat 96%    1143: Verbal order for 100 mg Solucortef and 20 mg pepcid received from Ngozi Elizabeth.     1145: Solucortef and Pepcid given     1148: Pt still appears flushed and states that the abdominal pain and nausea has not improved. BP: 221/101, HR: 71    1151: Dr. Ludwig at bedside assessing pt. Verbal order for 25 mg Demerol and 10 mg compazine.    1154: Demerol given     1155: Compazine given     1158: Pt reports that abdominal pain and nausea has improved and now rates the abd pain as a 3/10. BP: 215/106, HR: 62, 02 sat 97%    1204: Pt is no longer flushed and reports that abdominal pain and nausea has completely resolved.     1207: /89, HR: 68    1218: BP: 189/90, Hr: 71. Pt resting and states that he feels back to baseline. States that all symptoms have resolved. Per Ngozi, pt will still receive 5-FU today.     Adrucil ball connected with line and port unclamped. Pt continues to deny previous symptoms and is in no distress. Pt's son arrived to drive pt home. PT to return on Thursday for chemo unhook with IV fluids. Schedule printed for pt at discharge. PT discharged in a stable condition in a wheelchair with family.

## 2023-12-07 ENCOUNTER — INFUSION (OUTPATIENT)
Dept: ONCOLOGY | Facility: HOSPITAL | Age: 73
End: 2023-12-07
Payer: MEDICARE

## 2023-12-07 VITALS
RESPIRATION RATE: 15 BRPM | WEIGHT: 167.8 LBS | TEMPERATURE: 98 F | DIASTOLIC BLOOD PRESSURE: 93 MMHG | SYSTOLIC BLOOD PRESSURE: 159 MMHG | BODY MASS INDEX: 25.43 KG/M2 | HEART RATE: 78 BPM | HEIGHT: 68 IN | OXYGEN SATURATION: 99 %

## 2023-12-07 DIAGNOSIS — C16.0 MALIGNANT NEOPLASM OF CARDIA OF STOMACH: ICD-10-CM

## 2023-12-07 DIAGNOSIS — C16.9 GASTRIC ADENOCARCINOMA: Primary | ICD-10-CM

## 2023-12-07 DIAGNOSIS — Z45.2 FITTING AND ADJUSTMENT OF VASCULAR CATHETER: ICD-10-CM

## 2023-12-07 LAB
ALBUMIN SERPL-MCNC: 4.1 G/DL (ref 3.5–5.2)
ALBUMIN/GLOB SERPL: 1.6 G/DL
ALP SERPL-CCNC: 97 U/L (ref 39–117)
ALT SERPL W P-5'-P-CCNC: 29 U/L (ref 1–41)
ANION GAP SERPL CALCULATED.3IONS-SCNC: 12.3 MMOL/L (ref 5–15)
AST SERPL-CCNC: 33 U/L (ref 1–40)
BILIRUB SERPL-MCNC: 0.6 MG/DL (ref 0–1.2)
BUN SERPL-MCNC: 19 MG/DL (ref 8–23)
BUN/CREAT SERPL: 20.7 (ref 7–25)
CALCIUM SPEC-SCNC: 9.7 MG/DL (ref 8.6–10.5)
CHLORIDE SERPL-SCNC: 104 MMOL/L (ref 98–107)
CO2 SERPL-SCNC: 27.7 MMOL/L (ref 22–29)
CREAT SERPL-MCNC: 0.92 MG/DL (ref 0.76–1.27)
EGFRCR SERPLBLD CKD-EPI 2021: 88.4 ML/MIN/1.73
GLOBULIN UR ELPH-MCNC: 2.6 GM/DL
GLUCOSE SERPL-MCNC: 147 MG/DL (ref 65–99)
MAGNESIUM SERPL-MCNC: 2 MG/DL (ref 1.6–2.4)
POTASSIUM SERPL-SCNC: 3.3 MMOL/L (ref 3.5–5.2)
PROT SERPL-MCNC: 6.7 G/DL (ref 6–8.5)
SODIUM SERPL-SCNC: 144 MMOL/L (ref 136–145)

## 2023-12-07 PROCEDURE — 83735 ASSAY OF MAGNESIUM: CPT | Performed by: INTERNAL MEDICINE

## 2023-12-07 PROCEDURE — 80053 COMPREHEN METABOLIC PANEL: CPT | Performed by: INTERNAL MEDICINE

## 2023-12-07 PROCEDURE — 96360 HYDRATION IV INFUSION INIT: CPT

## 2023-12-07 PROCEDURE — 25810000003 SODIUM CHLORIDE 0.9 % SOLUTION: Performed by: NURSE PRACTITIONER

## 2023-12-07 PROCEDURE — 25010000002 HEPARIN LOCK FLUSH PER 10 UNITS: Performed by: INTERNAL MEDICINE

## 2023-12-07 RX ORDER — HEPARIN SODIUM (PORCINE) LOCK FLUSH IV SOLN 100 UNIT/ML 100 UNIT/ML
500 SOLUTION INTRAVENOUS AS NEEDED
Status: DISCONTINUED | OUTPATIENT
Start: 2023-12-07 | End: 2023-12-07 | Stop reason: HOSPADM

## 2023-12-07 RX ORDER — SODIUM CHLORIDE 0.9 % (FLUSH) 0.9 %
10 SYRINGE (ML) INJECTION AS NEEDED
OUTPATIENT
Start: 2023-12-07

## 2023-12-07 RX ORDER — SODIUM CHLORIDE 0.9 % (FLUSH) 0.9 %
10 SYRINGE (ML) INJECTION AS NEEDED
Status: DISCONTINUED | OUTPATIENT
Start: 2023-12-07 | End: 2023-12-07 | Stop reason: HOSPADM

## 2023-12-07 RX ORDER — HEPARIN SODIUM (PORCINE) LOCK FLUSH IV SOLN 100 UNIT/ML 100 UNIT/ML
500 SOLUTION INTRAVENOUS AS NEEDED
OUTPATIENT
Start: 2023-12-07

## 2023-12-07 RX ADMIN — SODIUM CHLORIDE 1000 ML: 9 INJECTION, SOLUTION INTRAVENOUS at 11:06

## 2023-12-07 RX ADMIN — Medication 10 ML: at 12:14

## 2023-12-07 RX ADMIN — Medication 500 UNITS: at 12:14

## 2023-12-07 NOTE — NURSING NOTE
Patient states he is doing well with eating small, frequent meals. Patient states his diarrhea is controlled with imodium. Patient states he has been taking his potassium supplement. Patient states he has been feeling fine and not sure that he needs the IV fluids. Reviewed patient's BP and above information with BHARAT Jones and reji for patient to proceed with only 500ml NS today pending labs.     Lab Results   Component Value Date    GLUCOSE 147 (H) 12/07/2023    BUN 19 12/07/2023    CREATININE 0.92 12/07/2023    EGFR 88.4 12/07/2023    BCR 20.7 12/07/2023    K 3.3 (L) 12/07/2023    CO2 27.7 12/07/2023    CALCIUM 9.7 12/07/2023    PROTENTOTREF 7.0 06/13/2023    ALBUMIN 4.1 12/07/2023    BILITOT 0.6 12/07/2023    AST 33 12/07/2023    ALT 29 12/07/2023    Magnesium = 2.0.    Labs reviewed with patient. Patient instructed to keep taking his oral potassium supplement. Patient verbalizes understanding. Patient has no complaints and wishes to only have 500mL NS today. Copy of labs given to patient. Total of 500 ml NS given. Follow up appointment reviewed. Patient is instructed to call the office with any concerns or new symptoms prior to next visit. Patient verbalized understanding and discharged in stable condition.

## 2023-12-07 NOTE — PROGRESS NOTES
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   Neurological:      Mental Status: He is alert and oriented to person, place, and time.   Psychiatric:         Behavior: Behavior normal.           RECENT LABS:  Hematology WBC   Date Value Ref Range Status   12/05/2023 5.88 3.40 - 10.80 10*3/mm3 Final   05/26/2021 7.73 4.5 - 11.0 10*3/uL Final     RBC   Date Value Ref Range Status   12/05/2023 4.36 4.14 - 5.80 10*6/mm3 Final   05/26/2021 4.94 4.5 - 5.9 10*6/uL Final     Hemoglobin   Date Value Ref Range Status   12/05/2023 12.9 (L) 13.0 - 17.7 g/dL Final   03/09/2023 14.4 13.7 - 17.5 Gram/dL Final   05/26/2021 15.9 13.5 - 17.5 g/dL Final     Hematocrit   Date Value Ref Range Status   12/05/2023 38.8 37.5 - 51.0 % Final   03/09/2023 41.2 40.1 - 51.0 % Final     Platelets   Date Value Ref Range Status   12/05/2023 129 (L) 140 - 450 10*3/mm3 Final   05/26/2021 208 140 - 440 10*3/uL Final          Assessment & Plan     *Gastric Adenocarcinoma  73-year-old male followed by primary care with hypertension, hyperlipidemia, type 2 diabetes, chronic venous stasis osteoarthritis and gout.  He had developed polyarticular joint pain in December and required hospitalization with diet alteration and IV diuretics.  He did improve fortunately but began to have abdominal discomfort thereafter leading to assessment of gallbladder, renal ultrasound with a potential cortical defect left kidney and a follow-up CT scan of abdomen pelvis  that revealed hepatic steatosis and colonic diverticulosis.  The patient was seen by GI for screening colonoscopy and surveillance of adenomatous colonic polyps.  At this point he is having upper abdominal symptoms thought to be gastritis though EGD was performed 4/27/2023 ultimately revealing an ulceration found on the posterior wall of the gastric body.  Biopsy was positive for poorly differentiated adenocarcinoma arising in a background of intestinal metaplasia and high-grade dysplasia, immunostain HER2 negative.  The patient's subsequent CT scan of chest and pelvis showed circumferential thickening of the lesser curvature of the stomach with a central area of ulceration and prominent gastrohepatic ligament lymphadenopathy concerning metastatic disease but no clear evidence of distant metastatic disease.  The patient is contacted by telephone 5/18/2023 and we plan to proceed with PET/CT next available, surgical assessment and follow-up in in CBC office subsequently.  The patient was more appropriately directed to general surgery initially and seen 5/26/2023 with consideration that he undergo peritoneal washing to evaluate for malignant cells.  This occurred through Dr. Mckenzie 5/30/2023-EGD and laparoscopy.  Study revealed through a retroflexed view the fundus and ulcerated tumor located along the lesser curvature immediately distal to the GE junction, 4 cm distal to the GE junction.  Washings were negative for definitive for malignancy.  It was concluded that he would need an esophagogastrectomy and the case was discussed with  who favored upfront chemotherapy given neoadjuvantly.  The patient was seen 6/5/2023 and it was determined that endoscopic ultrasound be obtained and if this revealed T1 tumor and he be a good candidate for initial surgery.  The patient is to be seen by Dr. Hodge concerning this.  The patient is seen on 6/13/2023 in office.  We have discussed, in detail with his son present, his  current status including the need for EUS, subsequent port placement as we determine his tumor stage.  Available, currently, his PET/CT performed 5/3/2023 showing low-level activity in the short segment of the lesser curvature at the site of his gastric malignancy, otherwise negative with very low-level metabolic activity in the abdomen and shotty nodes in the axilla and both groin regions.  The patient was now scheduled for EUS next week with results pending.  Pending the T stage he could well be a candidate for neoadjuvant chemotherapy but we find now that his peripheral neuropathy is both severe and longstanding and thus the use of certain chemotherapy regimens such as FLOT (frequently used) is of concern since peripheral neuropathy risk could be quite high.  Alternative FOLFOX could be utilized with oxaliplatin dose adjusted pending his degree of neuropathic symptoms.  He returned to laboratory undergoing exams including normal B12, ferritin, iron profile 10% saturation, CEA 4.56, hemoglobin A1c of 6.50.  Additionally underwent teaching for FOLFOX chemotherapy.  Dr. Varela has contacted me indicating that EUS is scheduled 6/21/2023 and he will schedule PowerPort 6/23/2023.  The patient proceeded to PET/CT performed 5/3/2023 showing low-level activity in the short segment of the lesser curvature at the site of his gastric malignancy, otherwise negative with very low-level metabolic activity in the abdomen and shotty nodes in the axilla and both groin regions.  The patient continued his staging undergoing EGD and EUS 6/21/2023 demonstrated gastric ulceration/mass in the mid to proximal portion lesser curvature of the stomach measuring 5-7 cm, EUS with gastric mass and invasion of muscularis propria with 1 area penetrating through the muscularis propria into the adventitia-likely T2 lesion, 3 small lymph nodes celiac axis/gastrohepatic region not overtly hyperechoic largest measuring 6 mm, normal pancreatic EUS and  fatty liver with no metastatic lesions.  FNB x2 performed the largest lymph node negative cytologically.  He had seen  6/21/2023 with plans to approach a T2 lesion or higher with chemotherapy neoadjuvant adjuvant settings-likely to require total gastrectomy, partial [place] Arnulfo esophagectomy and Steffany-en-Y reconstruction.  Seen back in office 6/27/2023 to proceed with chemotherapy-FOLFOX.  We discussed that he would be offered 4 cycles preoperative and 4 cycles postoperative pending tolerance.  His findings and course will be discussed with his surgeons as we proceed.  Patient reviewed back today following first cycle of FOLFOX.  He reports he is feeling well and has remained asymptomatic.  He has not noted any increased neuropathy from his baseline.  He denies any nausea, vomiting, increased arthralgias, cold sensation, or diarrhea.  He did have some mild constipation initially that resolved on its own.  He has remained active and continues to eat and drink well.  He does not feel that he needs any IV fluids today.  The patient is seen 7/11/2023 for his second cycle of FOLFOX chemotherapy.  7/25/2023 cycle 3 neoadjuvant FOLFOX (out of 4 preoperative cycles planned).  Tolerating well.  We went on to proceed with cycle 3 FOLFOX and plans for the patient to be seen 2 weeks later.  In the meantime he has been seen by Dr. Varela anticipating surgical resection to require total gastrectomy, partial [place] Arnulfo esophagectomy and Steffany-en-Y reconstruction.  Currently the patient is scheduled for a PET 8/14/2023 and review by Dr. Varela 8/17/2023.  The patient is next seen 8/9/2023.  He is ready to proceed with his fourth cycle of therapy and subsequent PET/CT.  His case has been discussed with  as well as to his plans described above.  The patient proceeded to PET/CT 8/14/2023 to document resolution of focal hypermetabolism along the gastric lesser curvature, subcentimeter upper abdominal retroperitoneal lymphadenopathy  was too small to characterize as were unchanged 5 to 6 mm right lower lobe solid pulmonary nodules.  The patient was admitted 9/13-9/18 undergoing Noble total gastrectomy with Steffany-en-Y, esophagojejunostomy and jejunal feeding tube placement.  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  Final stage: [id number].  The patient was seen back by Dr. Mckenzie 9/28/2023 with his incision healing and staples removed, J-tube had not been used and was removed patient was doing well enough to be referred back to proceed with his next portion of therapy-4 cycles of FOLFOX.  Patient seen 10/17/2023.  He is seen formally 10/16/2023.  Fortunately he is doing exceedingly well and, while he is working to manage his new diet, is confirmed with Dr. Mckenzie that we can proceed with her second phase of chemotherapy with 4 additional cycles of FOLFOX.  10/24/2023: Cycle 5 FOLFOX.  Was previously struggling with constipation, for the last week has had diarrhea up to 8 episodes daily.  Has not taken any antidiarrheals, reports he was unsure if he could take these following surgery.  Weight is down approximately 10 pounds since his last visit.  Admits he is not eating much, as he typically has diarrhea after eating.  Also admits he is not drinking a lot of fluid, about 20 ounces daily.  He is scheduled to see dee Friasitian following our visit today who plans to review dietary recommendations.  Recommended he start Imodium as needed for diarrhea.  If no  improvement with Imodium plan to start Lomotil.  We will proceed with 500 cc normal saline prior to chemotherapy.   Magnesium checked today and normal at 1.9.  10/31/2023: Diarrhea is significantly improved with Imodium use.  Typically taking 1 Imodium tablet every 3-4 days.  Feels his fluid intake has been doing good.  Proceed with 500 cc normal saline today while we await CMP results.  Creatinine normal, patient will not receive any further fluids.  11/7/2023: cycle 6 FOLFOX today.  Tolerating well.  11/15/2023: Seen for triage due to concerns for dehydration.  Creatinine 0.83, BUN 17.  Received 500 cc normal saline.  11/21/2023 C7 FOLFOX.     11/28/2023: 1 L IV normal saline today.  Patient feels improvement overall, especially related to fatigue after receiving fluids and would like to continue to do so today.    He is emotional today, feeling frustrated in regards to his eating restrictions.  This has been more difficult during the holidays.  We discussed getting set up with our supportive oncology team, and he is agreeable.  Referral will be placed today.  Patient is/12/5/2023 for fourth cycle of FOLFOX, improved performance status and mood.  Agrees to proceed with his final course of adjuvant therapy and subsequent repeat scans in 1 week.  Unfortunately the patient had a reaction during his administration of his last chemotherapy regimen leading to discontinuance of the treatment.  This led to gradual improvement of his symptoms with nausea completely resolving.  At this point we went on to have him complete the 5-FU portion of his treatment and he underwent repeat scans In and accelerated fashion with CT of chest, and, pelvis 12/8/2023 that is reviewed with him 12/11/2023 demonstrating small residual fluid within the abdomen and pelvis, small nodes into the distal esophagus slightly larger and thought to be reactive.  No additional abnormalities are present.  We have discussed maintaining his port with every  4 week flushes and have not seen back in 8 weeks and possibly rescan in approximately 3 months.    *Venous access  patient underwent Mediport placement 6/23/2023   Patient's port evaluated 7/10/2023 having been accessed for week after last visit.  Fortunately the area in question is not significantly inflamed nor fluctuant.  We have deaccessed, cleaned the site, reaccessed and plan to proceed with blood cultures pending.  Size reevaluated subsequent without additional inflammation    *Hypertension  Patient admits he has been noncompliant with his medications, does not take them regularly.    Blood pressure 10/26/2023 185/102.  He is fortunately asymptomatic.  Strongly encouraged him to resume blood pressure medications and close monitoring of blood pressure at home, reports he is agreeable with this.   10/31/2023: Blood pressure 176/102.  Has been compliant with his blood pressure medication over the last week.  Asymptomatic.  Checking his blood pressure at home and reports it typically runs 150s over 90s.  He plans to follow-up with his PCP in regards to blood pressure management.  Did advise if he becomes symptomatic he should present to the ER.  11/7/2023: BP today 167/91.  Recently started on clonidine patch.  Having some neck stiffness from the clonidine patch.  Follows again with his PCP Thursday for further management.  11/15/2023: Pressure remains elevated 160/88.  Denies any chest pain or shortness of breath.  500 mils of normal saline given today for hydration while CMP pending.  11/21/2023: /98.  Asymptomatic.  Continues with clonidine patch.  11/28/2023: 157/91.,  Continues treatment evaluated 12/5/2023    *Hypokalemia  Potassium 2.9.  Patient again admits he has not been taking his oral potassium regularly.  Encouraged him to continue taking 20 mEq twice daily.  Also encouraged increasing potassium in his diet.  11/15/2023: Potassium is 3.4 today.  He has potassium chloride 20 mill equivalents he  has been asked to take twice daily and encouraged to increase his potassium intake in his diet.  Does admit he has not been taking this consistently but does plan to do so.  We will continue to monitor.   11/21/2023: Potassium today still low at 3.3.  He again admits he is not taking his potassium replacement.  He will be provided with a handout on high potassium foods, encouraged him to increase his dietary potassium if he will not take oral potassium supplement.   11/28/2023: Potassium 3.7.  Repeat 12/5/2023 3.1, diet adjusted, recheck 12/7/2023 at 3.3    *Weight loss  11/15/2023: Patient continues to struggle with his appetite and has been losing weight.    Enterade provided to patient to start in hopes of reducing GI symptoms related to chemotherapy.  11/21/2023: Weight improved 174 pounds.  Diarrhea has improved with Enterade.  Continue follow up with oncology dietician.     PLAN:  Referral to supportive oncology.  Continue increasing dietary potassium.  Continue follow-up with oncology dietitian.  Continue Imodium as needed for diarrhea  Continue current hypertensive regimen and follow-up with PCP.  Port flush every 4 weeks, NP assessment 8 weeks, port flush.  Anticipate repeat scans in 3 months  Patient remains on high risk medication and requires close monitoring for toxicity.    Mikel Ludwig MD  12/11/23

## 2023-12-08 ENCOUNTER — HOSPITAL ENCOUNTER (OUTPATIENT)
Dept: CT IMAGING | Facility: HOSPITAL | Age: 73
Discharge: HOME | End: 2023-12-08
Admitting: INTERNAL MEDICINE
Payer: MEDICARE

## 2023-12-08 ENCOUNTER — INFUSION (OUTPATIENT)
Dept: ONCOLOGY | Facility: HOSPITAL | Age: 73
End: 2023-12-08
Payer: MEDICARE

## 2023-12-08 DIAGNOSIS — C16.0 MALIGNANT NEOPLASM OF CARDIA OF STOMACH: ICD-10-CM

## 2023-12-08 DIAGNOSIS — C16.9 GASTRIC ADENOCARCINOMA: ICD-10-CM

## 2023-12-08 PROCEDURE — 25510000001 IOPAMIDOL 61 % SOLUTION: Performed by: INTERNAL MEDICINE

## 2023-12-08 PROCEDURE — 0 DIATRIZOATE MEGLUMINE & SODIUM PER 1 ML: Performed by: INTERNAL MEDICINE

## 2023-12-08 PROCEDURE — 71260 CT THORAX DX C+: CPT

## 2023-12-08 PROCEDURE — 74177 CT ABD & PELVIS W/CONTRAST: CPT

## 2023-12-08 RX ADMIN — IOPAMIDOL 90 ML: 612 INJECTION, SOLUTION INTRAVENOUS at 13:15

## 2023-12-08 RX ADMIN — DIATRIZOATE MEGLUMINE AND DIATRIZOATE SODIUM 30 ML: 660; 100 LIQUID ORAL; RECTAL at 12:00

## 2023-12-11 ENCOUNTER — OFFICE VISIT (OUTPATIENT)
Dept: ONCOLOGY | Facility: CLINIC | Age: 73
End: 2023-12-11
Payer: MEDICARE

## 2023-12-11 VITALS
TEMPERATURE: 98.6 F | HEART RATE: 70 BPM | WEIGHT: 168.3 LBS | OXYGEN SATURATION: 99 % | HEIGHT: 68 IN | RESPIRATION RATE: 16 BRPM | SYSTOLIC BLOOD PRESSURE: 179 MMHG | BODY MASS INDEX: 25.51 KG/M2 | DIASTOLIC BLOOD PRESSURE: 124 MMHG

## 2023-12-11 DIAGNOSIS — C16.9 GASTRIC ADENOCARCINOMA: Primary | ICD-10-CM

## 2023-12-11 PROCEDURE — 99214 OFFICE O/P EST MOD 30 MIN: CPT | Performed by: INTERNAL MEDICINE

## 2023-12-11 PROCEDURE — 1126F AMNT PAIN NOTED NONE PRSNT: CPT | Performed by: INTERNAL MEDICINE

## 2023-12-11 RX ORDER — DULOXETIN HYDROCHLORIDE 20 MG/1
20 CAPSULE, DELAYED RELEASE ORAL DAILY
Qty: 60 CAPSULE | Refills: 1 | Status: SHIPPED | OUTPATIENT
Start: 2023-12-11

## 2023-12-13 ENCOUNTER — DOCUMENTATION (OUTPATIENT)
Dept: OTHER | Facility: HOSPITAL | Age: 73
End: 2023-12-13
Payer: MEDICARE

## 2023-12-13 NOTE — PROGRESS NOTES
Oncology Social Work  [facility]     Chief Complaint: depression, low self esteem     Subjective  Patient ID: Mark Ken is a 73 y.o. male who presents to the [facility] ([facility]) clinic for initial consultation at the request of Dr Ludwig to see for supportive therapy as his chemotherapy comes to an end.  OSW met with patient for 2nd visit today.  Still struggling with some insomnia - needing to change nighttime routine.  Still reports low self esteem/ self worth.       Objective   Mental Status Exam  Appearance:  clean and casually dressed, appropriate  Attitude toward clinician:  cooperative and agreeable   Speech:    Rate:  regular rate and rhythm   Volume:  soft   Motor:  no abnormal movements present  Mood:  sad   Affect:  euthymic  Thought Processes:  linear, logical, and goal directed  Thought Content:  normal  Suicidal Thoughts:  absent  Homicidal Thoughts:  absent  Perceptual Disturbance: no perceptual disturbance  Attention and Concentration:  good  Insight and Judgement:  good  Memory:  memory appears to be intact    Physical Exam  Station and gait observed to be WNL patient is independent with ADL's and mobility.    Medications:  Patient on Cymbalta - Dr. Ludwig reduced the dosage to 20mg      Plan of Care  Will cont to see OSW for supportive therapy.   Patient has been in contact with [employer] [employer] and [facility] volunteer. He recently purchased a cat - her name is Verona Sparks.  Patient continues to golf with his son.  And engage in family activities with son and grandchildren.   He is interested in Hypnosis -  OSW to explore where he could go to engage in this.   Will complete self esteem worksheet at next visit.    Jessika Mota, WILLW, LCSW

## 2024-01-08 ENCOUNTER — INFUSION (OUTPATIENT)
Dept: ONCOLOGY | Facility: HOSPITAL | Age: 74
End: 2024-01-08
Payer: MEDICARE

## 2024-01-08 DIAGNOSIS — Z45.2 FITTING AND ADJUSTMENT OF VASCULAR CATHETER: Primary | ICD-10-CM

## 2024-01-08 PROCEDURE — 25010000002 HEPARIN LOCK FLUSH PER 10 UNITS: Performed by: INTERNAL MEDICINE

## 2024-01-08 PROCEDURE — 96523 IRRIG DRUG DELIVERY DEVICE: CPT

## 2024-01-08 RX ORDER — SODIUM CHLORIDE 0.9 % (FLUSH) 0.9 %
10 SYRINGE (ML) INJECTION AS NEEDED
Status: DISCONTINUED | OUTPATIENT
Start: 2024-01-08 | End: 2024-01-08 | Stop reason: HOSPADM

## 2024-01-08 RX ORDER — HEPARIN SODIUM (PORCINE) LOCK FLUSH IV SOLN 100 UNIT/ML 100 UNIT/ML
500 SOLUTION INTRAVENOUS AS NEEDED
OUTPATIENT
Start: 2024-01-08

## 2024-01-08 RX ORDER — HEPARIN SODIUM (PORCINE) LOCK FLUSH IV SOLN 100 UNIT/ML 100 UNIT/ML
500 SOLUTION INTRAVENOUS AS NEEDED
Status: DISCONTINUED | OUTPATIENT
Start: 2024-01-08 | End: 2024-01-08 | Stop reason: HOSPADM

## 2024-01-08 RX ORDER — SODIUM CHLORIDE 0.9 % (FLUSH) 0.9 %
10 SYRINGE (ML) INJECTION AS NEEDED
OUTPATIENT
Start: 2024-01-08

## 2024-01-08 RX ADMIN — Medication 500 UNITS: at 13:33

## 2024-01-08 RX ADMIN — Medication 10 ML: at 13:33

## 2024-01-09 ENCOUNTER — SOCIAL WORK (OUTPATIENT)
Dept: PSYCHIATRY | Facility: HOSPITAL | Age: 74
End: 2024-01-09
Payer: MEDICARE

## 2024-01-09 NOTE — PROGRESS NOTES
Oncology Social Work  [facility]      Chief Complaint: depression, low self esteem      Subjective  Patient ID: Mark Ken is a 73 y.o. male who presents to the [facility] ([facility]) clinic for initial consultation at the request of Dr Ludwig to see for supportive therapy as his chemotherapy comes to an end.  OSW met with patient for 2nd visit today.  Still struggling with some insomnia - needing to change nighttime routine.  Still reports low self esteem/ self worth.  OSW met with patient for 3rd visit on 1/9/2024- patient with bright affect, improved mood, more energetic and engaging.  Patient reports that he went to see a Hypnotherapists to experience hypnosis ( he states that he did this long ago and it was helpful). He had one session with this therapist and it deemed good results. Provided patient with self esteem questionnaire - he scored high to both of our surprise.  Patient plans to see a chiropractor for his severe neuropathy in his feet.      Objective   Mental Status Exam  Appearance:  clean and casually dressed, appropriate  Attitude toward clinician:  cooperative and agreeable   Speech:               Rate:  regular rate and rhythm              Volume:  soft   Motor:  no abnormal movements present  Mood:  sad   Affect:  euthymic  Thought Processes:  linear, logical, and goal directed  Thought Content:  normal  Suicidal Thoughts:  absent  Homicidal Thoughts:  absent  Perceptual Disturbance: no perceptual disturbance  Attention and Concentration:  good  Insight and Judgement:  good  Memory:  memory appears to be intact     Physical Exam  Station and gait observed to be WNL patient is independent with ADL's and mobility.     Medications:  Patient on Cymbalta - Dr. Ludwig reduced the dosage to 20mg- 1/9/24, patient reports that he is no longer taking.         Plan of Care  OSW will remain available if future supportive therapy sessions are desired.   Patient feels that  "he is doing much better. He continues to engage in activities with his family, golf and has begun working at [employer] for extra \" fun\" cash.  He enjoys doing more with his day.  Decreased isolation has produced favorable for him.     Jessika Mota, WILLW, LCSW  "

## 2024-02-04 NOTE — PROGRESS NOTES
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      Pupils: Pupils are equal, round, and reactive to light.   Cardiovascular:      Rate and Rhythm: Normal rate and regular rhythm.      Heart sounds: Normal heart sounds.   Pulmonary:      Effort: Pulmonary effort is normal. No respiratory distress.      Breath sounds: Normal breath sounds. No wheezing, rhonchi or rales.   Abdominal:      General: Bowel sounds are normal. There is no distension.      Palpations: Abdomen is soft.      Tenderness: There is no abdominal tenderness.   Musculoskeletal:         General: Normal range of motion.      Cervical back: Normal range of motion and neck supple.   Skin:     General: Skin is warm and dry.   Neurological:      Mental Status: He is alert and oriented to person, place, and time.   Psychiatric:         Behavior: Behavior normal.           RECENT LABS:  Hematology WBC   Date Value Ref Range Status   02/06/2024 7.75 3.40 - 10.80 10*3/mm3 Final   05/26/2021 7.73 4.5 - 11.0 10*3/uL Final     RBC   Date Value Ref Range Status   02/06/2024 4.40 4.14 - 5.80 10*6/mm3 Final   05/26/2021 4.94 4.5 - 5.9 10*6/uL Final     Hemoglobin   Date Value Ref Range Status   02/06/2024 13.4 13.0 - 17.7 g/dL Final   03/09/2023 14.4 13.7 - 17.5 Gram/dL Final   05/26/2021 15.9 13.5 - 17.5 g/dL Final     Hematocrit   Date Value Ref Range Status   02/06/2024 40.1 37.5 - 51.0 % Final "   03/09/2023 41.2 40.1 - 51.0 % Final     Platelets   Date Value Ref Range Status   02/06/2024 245 140 - 450 10*3/mm3 Final   05/26/2021 208 140 - 440 10*3/uL Final          Assessment & Plan     *Gastric Adenocarcinoma  73-year-old male followed by primary care with hypertension, hyperlipidemia, type 2 diabetes, chronic venous stasis osteoarthritis and gout.  He had developed polyarticular joint pain in December and required hospitalization with diet alteration and IV diuretics.  He did improve fortunately but began to have abdominal discomfort thereafter leading to assessment of gallbladder, renal ultrasound with a potential cortical defect left kidney and a follow-up CT scan of abdomen pelvis that revealed hepatic steatosis and colonic diverticulosis.  The patient was seen by GI for screening colonoscopy and surveillance of adenomatous colonic polyps.  At this point he is having upper abdominal symptoms thought to be gastritis though EGD was performed 4/27/2023 ultimately revealing an ulceration found on the posterior wall of the gastric body.  Biopsy was positive for poorly differentiated adenocarcinoma arising in a background of intestinal metaplasia and high-grade dysplasia, immunostain HER2 negative.  The patient's subsequent CT scan of chest and pelvis showed circumferential thickening of the lesser curvature of the stomach with a central area of ulceration and prominent gastrohepatic ligament lymphadenopathy concerning metastatic disease but no clear evidence of distant metastatic disease.  The patient is contacted by telephone 5/18/2023 and we plan to proceed with PET/CT next available, surgical assessment and follow-up in in CBC office subsequently.  The patient was more appropriately directed to general surgery initially and seen 5/26/2023 with consideration that he undergo peritoneal washing to evaluate for malignant cells.  This occurred through Dr. Mckenzie 5/30/2023-EGD and laparoscopy.  Study  revealed through a retroflexed view the fundus and ulcerated tumor located along the lesser curvature immediately distal to the GE junction, 4 cm distal to the GE junction.  Washings were negative for definitive for malignancy.  It was concluded that he would need an esophagogastrectomy and the case was discussed with  who favored upfront chemotherapy given neoadjuvantly.  The patient was seen 6/5/2023 and it was determined that endoscopic ultrasound be obtained and if this revealed T1 tumor and he be a good candidate for initial surgery.  The patient is to be seen by Dr. Hodge concerning this.  The patient is seen on 6/13/2023 in office.  We have discussed, in detail with his son present, his current status including the need for EUS, subsequent port placement as we determine his tumor stage.  Available, currently, his PET/CT performed 5/3/2023 showing low-level activity in the short segment of the lesser curvature at the site of his gastric malignancy, otherwise negative with very low-level metabolic activity in the abdomen and shotty nodes in the axilla and both groin regions.  The patient was now scheduled for EUS next week with results pending.  Pending the T stage he could well be a candidate for neoadjuvant chemotherapy but we find now that his peripheral neuropathy is both severe and longstanding and thus the use of certain chemotherapy regimens such as FLOT (frequently used) is of concern since peripheral neuropathy risk could be quite high.  Alternative FOLFOX could be utilized with oxaliplatin dose adjusted pending his degree of neuropathic symptoms.  He returned to laboratory undergoing exams including normal B12, ferritin, iron profile 10% saturation, CEA 4.56, hemoglobin A1c of 6.50.  Additionally underwent teaching for FOLFOX chemotherapy.  Dr. Varela has contacted me indicating that EUS is scheduled 6/21/2023 and he will schedule PowerPort 6/23/2023.  The patient proceeded to PET/CT performed  5/3/2023 showing low-level activity in the short segment of the lesser curvature at the site of his gastric malignancy, otherwise negative with very low-level metabolic activity in the abdomen and shotty nodes in the axilla and both groin regions.  The patient continued his staging undergoing EGD and EUS 6/21/2023 demonstrated gastric ulceration/mass in the mid to proximal portion lesser curvature of the stomach measuring 5-7 cm, EUS with gastric mass and invasion of muscularis propria with 1 area penetrating through the muscularis propria into the adventitia-likely T2 lesion, 3 small lymph nodes celiac axis/gastrohepatic region not overtly hyperechoic largest measuring 6 mm, normal pancreatic EUS and fatty liver with no metastatic lesions.  FNB x2 performed the largest lymph node negative cytologically.  He had seen  6/21/2023 with plans to approach a T2 lesion or higher with chemotherapy neoadjuvant adjuvant settings-likely to require total gastrectomy, partial Jasiel Arnulfo esophagectomy and Steffany-en-Y reconstruction.  Seen back in office 6/27/2023 to proceed with chemotherapy-FOLFOX.  We discussed that he would be offered 4 cycles preoperative and 4 cycles postoperative pending tolerance.  His findings and course will be discussed with his surgeons as we proceed.  Patient reviewed back today following first cycle of FOLFOX.  He reports he is feeling well and has remained asymptomatic.  He has not noted any increased neuropathy from his baseline.  He denies any nausea, vomiting, increased arthralgias, cold sensation, or diarrhea.  He did have some mild constipation initially that resolved on its own.  He has remained active and continues to eat and drink well.  He does not feel that he needs any IV fluids today.  The patient is seen 7/11/2023 for his second cycle of FOLFOX chemotherapy.  7/25/2023 cycle 3 neoadjuvant FOLFOX (out of 4 preoperative cycles planned).  Tolerating well.  We went on to proceed with  cycle 3 FOLFOX and plans for the patient to be seen 2 weeks later.  In the meantime he has been seen by Dr. Varela anticipating surgical resection to require total gastrectomy, partial Jasiel Arnulfo esophagectomy and Steffany-en-Y reconstruction.  Currently the patient is scheduled for a PET 8/14/2023 and review by Dr. Varela 8/17/2023.  The patient is next seen 8/9/2023.  He is ready to proceed with his fourth cycle of therapy and subsequent PET/CT.  His case has been discussed with  as well as to his plans described above.  The patient proceeded to PET/CT 8/14/2023 to document resolution of focal hypermetabolism along the gastric lesser curvature, subcentimeter upper abdominal retroperitoneal lymphadenopathy was too small to characterize as were unchanged 5 to 6 mm right lower lobe solid pulmonary nodules.  The patient was admitted 9/13-9/18 undergoing Noble total gastrectomy with Steffany-en-Y, esophagojejunostomy and jejunal feeding tube placement.  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  Final stage: [id number].  The patient was seen back by Dr. Mckenzie 9/28/2023 with his incision healing and staples removed, J-tube had not been used and was removed patient was doing well enough to be referred back to proceed with his next portion of therapy-4 cycles of FOLFOX.  Patient seen 10/17/2023.  He is seen formally 10/16/2023.  Fortunately he is doing exceedingly well and, while he is working to manage his new diet, is confirmed with   Jonah that we can proceed with her second phase of chemotherapy with 4 additional cycles of FOLFOX.  10/24/2023: Cycle 5 FOLFOX.  Was previously struggling with constipation, for the last week has had diarrhea up to 8 episodes daily.  Has not taken any antidiarrheals, reports he was unsure if he could take these following surgery.  Weight is down approximately 10 pounds since his last visit.  Admits he is not eating much, as he typically has diarrhea after eating.  Also admits he is not drinking a lot of fluid, about 20 ounces daily.  He is scheduled to see [facility] dietitian following our visit today who plans to review dietary recommendations.  Recommended he start Imodium as needed for diarrhea.  If no improvement with Imodium plan to start Lomotil.  We will proceed with 500 cc normal saline prior to chemotherapy.   Magnesium checked today and normal at 1.9.  10/31/2023: Diarrhea is significantly improved with Imodium use.  Typically taking 1 Imodium tablet every 3-4 days.  Feels his fluid intake has been doing good.  Proceed with 500 cc normal saline today while we await CMP results.  Creatinine normal, patient will not receive any further fluids.  11/7/2023: cycle 6 FOLFOX today.  Tolerating well.  11/15/2023: Seen for triage due to concerns for dehydration.  Creatinine 0.83, BUN 17.  Received 500 cc normal saline.  11/21/2023 C7 FOLFOX.     11/28/2023: 1 L IV normal saline today.  Patient feels improvement overall, especially related to fatigue after receiving fluids and would like to continue to do so today.    He is emotional today, feeling frustrated in regards to his eating restrictions.  This has been more difficult during the holidays.  We discussed getting set up with our supportive oncology team, and he is agreeable.  Referral will be placed today.  Patient is/12/5/2023 for fourth cycle of FOLFOX, improved performance status and mood.  Agrees to proceed with his final course of adjuvant therapy and  subsequent repeat scans in 1 week.  Unfortunately the patient had a reaction during his administration of his last chemotherapy regimen leading to discontinuance of the treatment.  This led to gradual improvement of his symptoms with nausea completely resolving.  At this point we went on to have him complete the 5-FU portion of his treatment and he underwent repeat scans In and accelerated fashion with CT of chest, and, pelvis 12/8/2023 that is reviewed with him 12/11/2023 demonstrating small residual fluid within the abdomen and pelvis, small nodes into the distal esophagus slightly larger and thought to be reactive.  No additional abnormalities are present.  We have discussed maintaining his port with every 4 week flushes and have not seen back in 8 weeks and possibly rescan in approximately 3 months.  2/6/2024: Complains of loss of taste that occurred 3 weeks ago.  Has been difficulty eating adequately because of this, however does force himself to eat.  On exam, he has thrush present so we will start him on nystatin.  Altered taste could also be related to chemotherapy, however did advise him it could improve after treating his thrush.  Otherwise he is feeling well without any new concerns.  Scans in 4 weeks with MD follow up to review scans in 5 weeks.    *Venous access  patient underwent Mediport placement 6/23/2023   Patient's port evaluated 7/10/2023 having been accessed for week after last visit.  Fortunately the area in question is not significantly inflamed nor fluctuant.  We have deaccessed, cleaned the site, reaccessed and plan to proceed with blood cultures pending.  Size reevaluated subsequent without additional inflammation    *Hypertension  Patient admits he has been noncompliant with his medications, does not take them regularly.    Blood pressure 10/26/2023 185/102.  He is fortunately asymptomatic.  Strongly encouraged him to resume blood pressure medications and close monitoring of blood pressure  at home, reports he is agreeable with this.   10/31/2023: Blood pressure 176/102.  Has been compliant with his blood pressure medication over the last week.  Asymptomatic.  Checking his blood pressure at home and reports it typically runs 150s over 90s.  He plans to follow-up with his PCP in regards to blood pressure management.  Did advise if he becomes symptomatic he should present to the ER.  11/7/2023: BP today 167/91.  Recently started on clonidine patch.  Having some neck stiffness from the clonidine patch.  Follows again with his PCP Thursday for further management.  11/15/2023: Pressure remains elevated 160/88.  Denies any chest pain or shortness of breath.  500 mils of normal saline given today for hydration while CMP pending.  11/21/2023: /98.  Asymptomatic.  Continues with clonidine patch.  11/28/2023: 157/91.,  Continues treatment evaluated 12/5/2023 2/6/2024: 174/103.  Asymptomatic.  Has not been compliant with his blood pressure medication, strongly encouraged him to take blood pressure medication regularly and monitor his BP at home.    *Hypokalemia  Potassium 2.9.  Patient again admits he has not been taking his oral potassium regularly.  Encouraged him to continue taking 20 mEq twice daily.  Also encouraged increasing potassium in his diet.  11/15/2023: Potassium is 3.4 today.  He has potassium chloride 20 mill equivalents he has been asked to take twice daily and encouraged to increase his potassium intake in his diet.  Does admit he has not been taking this consistently but does plan to do so.  We will continue to monitor.   11/21/2023: Potassium today still low at 3.3.  He again admits he is not taking his potassium replacement.  He will be provided with a handout on high potassium foods, encouraged him to increase his dietary potassium if he will not take oral potassium supplement.   11/28/2023: Potassium 3.7.  Repeat 12/5/2023 3.1, diet adjusted, recheck 12/7/2023 at 3.3  2/6/2024:  Potassium 3.9.    *Weight loss  11/15/2023: Patient continues to struggle with his appetite and has been losing weight.    Enterade provided to patient to start in hopes of reducing GI symptoms related to chemotherapy.  11/21/2023: Weight improved 174 pounds.  Diarrhea has improved with Enterade.  Continue follow up with oncology dietician.   2/6/2024: Reports difficulty with eating adequately due to taste changes that started abruptly 3 weeks ago.  He has thrush present on exam which will be treated with nystatin.    *Oropharyngeal candidiasis-present today on exam.  Patient has been complaining of decreased taste for the last 3 weeks.  Start nystatin 4 times daily x 7 days.    PLAN:   Start nystatin 4 times daily x 7 days for thrush.  Prescription sent to pharmacy today.  Continue increasing dietary potassium.  Continue follow-up with oncology dietitian.  Continue Imodium as needed for diarrhea  Continue current hypertensive regimen and follow-up with PCP.  4 weeks port flush and CT scans.  Scan order placed today.  5 weeks MD to review scans.     BHARAT Jones  02/06/24

## 2024-02-06 ENCOUNTER — OFFICE VISIT (OUTPATIENT)
Dept: ONCOLOGY | Facility: CLINIC | Age: 74
End: 2024-02-06
Payer: MEDICARE

## 2024-02-06 ENCOUNTER — INFUSION (OUTPATIENT)
Dept: ONCOLOGY | Facility: HOSPITAL | Age: 74
End: 2024-02-06
Payer: MEDICARE

## 2024-02-06 VITALS
OXYGEN SATURATION: 100 % | HEIGHT: 68 IN | TEMPERATURE: 97.8 F | BODY MASS INDEX: 25.19 KG/M2 | RESPIRATION RATE: 16 BRPM | SYSTOLIC BLOOD PRESSURE: 174 MMHG | HEART RATE: 55 BPM | WEIGHT: 166.2 LBS | DIASTOLIC BLOOD PRESSURE: 103 MMHG

## 2024-02-06 DIAGNOSIS — C16.9 GASTRIC ADENOCARCINOMA: ICD-10-CM

## 2024-02-06 DIAGNOSIS — B37.0 OROPHARYNGEAL CANDIDIASIS: ICD-10-CM

## 2024-02-06 DIAGNOSIS — Z45.2 FITTING AND ADJUSTMENT OF VASCULAR CATHETER: Primary | ICD-10-CM

## 2024-02-06 DIAGNOSIS — R63.4 UNINTENTIONAL WEIGHT LOSS: ICD-10-CM

## 2024-02-06 DIAGNOSIS — Z79.899 HIGH RISK MEDICATION USE: ICD-10-CM

## 2024-02-06 DIAGNOSIS — C16.9 GASTRIC ADENOCARCINOMA: Primary | ICD-10-CM

## 2024-02-06 LAB
ALBUMIN SERPL-MCNC: 4 G/DL (ref 3.5–5.2)
ALBUMIN/GLOB SERPL: 1.5 G/DL
ALP SERPL-CCNC: 91 U/L (ref 39–117)
ALT SERPL W P-5'-P-CCNC: 20 U/L (ref 1–41)
ANION GAP SERPL CALCULATED.3IONS-SCNC: 9.5 MMOL/L (ref 5–15)
AST SERPL-CCNC: 22 U/L (ref 1–40)
BASOPHILS # BLD AUTO: 0.03 10*3/MM3 (ref 0–0.2)
BASOPHILS NFR BLD AUTO: 0.4 % (ref 0–1.5)
BILIRUB SERPL-MCNC: 0.5 MG/DL (ref 0–1.2)
BUN SERPL-MCNC: 11 MG/DL (ref 8–23)
BUN/CREAT SERPL: 13.1 (ref 7–25)
CALCIUM SPEC-SCNC: 9.6 MG/DL (ref 8.6–10.5)
CHLORIDE SERPL-SCNC: 105 MMOL/L (ref 98–107)
CO2 SERPL-SCNC: 26.5 MMOL/L (ref 22–29)
CREAT SERPL-MCNC: 0.84 MG/DL (ref 0.76–1.27)
DEPRECATED RDW RBC AUTO: 50.1 FL (ref 37–54)
EGFRCR SERPLBLD CKD-EPI 2021: 92.7 ML/MIN/1.73
EOSINOPHIL # BLD AUTO: 0.15 10*3/MM3 (ref 0–0.4)
EOSINOPHIL NFR BLD AUTO: 1.9 % (ref 0.3–6.2)
ERYTHROCYTE [DISTWIDTH] IN BLOOD BY AUTOMATED COUNT: 15 % (ref 12.3–15.4)
GLOBULIN UR ELPH-MCNC: 2.6 GM/DL
GLUCOSE SERPL-MCNC: 94 MG/DL (ref 65–99)
HCT VFR BLD AUTO: 40.1 % (ref 37.5–51)
HGB BLD-MCNC: 13.4 G/DL (ref 13–17.7)
IMM GRANULOCYTES # BLD AUTO: 0.01 10*3/MM3 (ref 0–0.05)
IMM GRANULOCYTES NFR BLD AUTO: 0.1 % (ref 0–0.5)
LYMPHOCYTES # BLD AUTO: 2.41 10*3/MM3 (ref 0.7–3.1)
LYMPHOCYTES NFR BLD AUTO: 31.1 % (ref 19.6–45.3)
MCH RBC QN AUTO: 30.5 PG (ref 26.6–33)
MCHC RBC AUTO-ENTMCNC: 33.4 G/DL (ref 31.5–35.7)
MCV RBC AUTO: 91.1 FL (ref 79–97)
MONOCYTES # BLD AUTO: 0.54 10*3/MM3 (ref 0.1–0.9)
MONOCYTES NFR BLD AUTO: 7 % (ref 5–12)
NEUTROPHILS NFR BLD AUTO: 4.61 10*3/MM3 (ref 1.7–7)
NEUTROPHILS NFR BLD AUTO: 59.5 % (ref 42.7–76)
NRBC BLD AUTO-RTO: 0 /100 WBC (ref 0–0.2)
PLATELET # BLD AUTO: 245 10*3/MM3 (ref 140–450)
PMV BLD AUTO: 9.7 FL (ref 6–12)
POTASSIUM SERPL-SCNC: 3.9 MMOL/L (ref 3.5–5.2)
PROT SERPL-MCNC: 6.6 G/DL (ref 6–8.5)
RBC # BLD AUTO: 4.4 10*6/MM3 (ref 4.14–5.8)
SODIUM SERPL-SCNC: 141 MMOL/L (ref 136–145)
WBC NRBC COR # BLD AUTO: 7.75 10*3/MM3 (ref 3.4–10.8)

## 2024-02-06 PROCEDURE — 80053 COMPREHEN METABOLIC PANEL: CPT | Performed by: INTERNAL MEDICINE

## 2024-02-06 PROCEDURE — 36591 DRAW BLOOD OFF VENOUS DEVICE: CPT

## 2024-02-06 PROCEDURE — 25010000002 HEPARIN LOCK FLUSH PER 10 UNITS: Performed by: INTERNAL MEDICINE

## 2024-02-06 PROCEDURE — 85025 COMPLETE CBC W/AUTO DIFF WBC: CPT | Performed by: INTERNAL MEDICINE

## 2024-02-06 RX ORDER — AMLODIPINE BESYLATE 5 MG/1
TABLET ORAL
COMMUNITY
Start: 2024-02-01

## 2024-02-06 RX ORDER — HEPARIN SODIUM (PORCINE) LOCK FLUSH IV SOLN 100 UNIT/ML 100 UNIT/ML
500 SOLUTION INTRAVENOUS AS NEEDED
Status: DISCONTINUED | OUTPATIENT
Start: 2024-02-06 | End: 2024-02-06 | Stop reason: HOSPADM

## 2024-02-06 RX ORDER — SODIUM CHLORIDE 0.9 % (FLUSH) 0.9 %
10 SYRINGE (ML) INJECTION AS NEEDED
Status: DISCONTINUED | OUTPATIENT
Start: 2024-02-06 | End: 2024-02-06 | Stop reason: HOSPADM

## 2024-02-06 RX ORDER — HEPARIN SODIUM (PORCINE) LOCK FLUSH IV SOLN 100 UNIT/ML 100 UNIT/ML
500 SOLUTION INTRAVENOUS AS NEEDED
OUTPATIENT
Start: 2024-02-06

## 2024-02-06 RX ORDER — SODIUM CHLORIDE 0.9 % (FLUSH) 0.9 %
10 SYRINGE (ML) INJECTION AS NEEDED
OUTPATIENT
Start: 2024-02-06

## 2024-02-06 RX ADMIN — Medication 10 ML: at 10:20

## 2024-02-06 RX ADMIN — Medication 500 UNITS: at 10:20

## 2024-03-13 ENCOUNTER — HOSPITAL ENCOUNTER (OUTPATIENT)
Dept: CT IMAGING | Facility: HOSPITAL | Age: 74
Discharge: HOME | End: 2024-03-13
Admitting: NURSE PRACTITIONER
Payer: MEDICARE

## 2024-03-13 ENCOUNTER — INFUSION (OUTPATIENT)
Dept: ONCOLOGY | Facility: HOSPITAL | Age: 74
End: 2024-03-13
Payer: MEDICARE

## 2024-03-13 DIAGNOSIS — C16.9 GASTRIC ADENOCARCINOMA: ICD-10-CM

## 2024-03-13 PROCEDURE — 82565 ASSAY OF CREATININE: CPT

## 2024-03-13 PROCEDURE — 25510000001 IOPAMIDOL 61 % SOLUTION: Performed by: NURSE PRACTITIONER

## 2024-03-13 PROCEDURE — 74177 CT ABD & PELVIS W/CONTRAST: CPT

## 2024-03-13 PROCEDURE — 0 DIATRIZOATE MEGLUMINE & SODIUM PER 1 ML: Performed by: NURSE PRACTITIONER

## 2024-03-13 PROCEDURE — 71260 CT THORAX DX C+: CPT

## 2024-03-13 RX ADMIN — IOPAMIDOL 90 ML: 612 INJECTION, SOLUTION INTRAVENOUS at 10:41

## 2024-03-13 RX ADMIN — DIATRIZOATE MEGLUMINE AND DIATRIZOATE SODIUM 30 ML: 660; 100 LIQUID ORAL; RECTAL at 09:08

## 2024-03-14 LAB — CREAT BLDA-MCNC: 0.8 MG/DL (ref 0.6–1.3)

## 2024-03-15 ENCOUNTER — TELEPHONE (OUTPATIENT)
Dept: ONCOLOGY | Facility: CLINIC | Age: 74
End: 2024-03-15
Payer: MEDICARE

## 2024-03-15 DIAGNOSIS — C16.9 GASTRIC ADENOCARCINOMA: Primary | ICD-10-CM

## 2024-03-15 DIAGNOSIS — C16.9 MALIGNANT NEOPLASM OF STOMACH, UNSPECIFIED LOCATION: ICD-10-CM

## 2024-03-15 NOTE — TELEPHONE ENCOUNTER
Provider: Dr Ludwig  Caller: Willie Ken  Relationship to Patient: Self  Call Back Phone Number: [phone]  Reason for Call: Pt left a voicemail...needs to review next appt. Thinks he has a port flush.. I think it's a lab

## 2024-03-19 NOTE — PROGRESS NOTES
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    Gastrointestinal:  Negative for abdominal pain (Improved, multiple small meals per day to prevent additional discomfort).   Genitourinary: Negative.    Musculoskeletal:  Positive for arthralgias (Unchanged from his baseline).   Skin: Negative.    Allergic/Immunologic: Negative.    Neurological:  Positive for numbness (Patient describes 30 years of peripheral neuropathy-severe-this has not worsened with chemotherapy thus far.).   Psychiatric/Behavioral: Negative.     Current    Objective     Vitals:    03/20/24 1031   BP: (!) 174/104   Pulse: 77   Resp: 16   Temp: 98 °F (36.7 °C)   TempSrc: Temporal   SpO2: 100%   Weight: 75.4 kg (166 lb 4.8 oz)   Height: 172.7 cm (67.99\")   PainSc: 0-No pain               3/20/2024    10:32 AM   Current Status   ECOG score 0       Physical Exam  Vitals and nursing note reviewed.   Constitutional:       Appearance: Normal appearance. He is well-developed.   HENT:      Head: Normocephalic and atraumatic.      Nose: Nose normal.      Mouth/Throat:      Comments: Residual candidiasis primarily tongue based  Eyes:      Pupils: Pupils are equal, round, and reactive to light.   Cardiovascular:      Rate and Rhythm: Normal rate and regular rhythm.      Heart sounds: Normal heart sounds.   Pulmonary:      Effort: Pulmonary effort is normal. No respiratory distress.      " Breath sounds: Normal breath sounds. No wheezing, rhonchi or rales.   Abdominal:      General: Bowel sounds are normal. There is no distension.      Palpations: Abdomen is soft.      Tenderness: There is no abdominal tenderness.   Musculoskeletal:         General: Normal range of motion.      Cervical back: Normal range of motion and neck supple.   Skin:     General: Skin is warm and dry.   Neurological:      Mental Status: He is alert and oriented to person, place, and time.   Psychiatric:         Behavior: Behavior normal.           RECENT LABS:  Hematology WBC   Date Value Ref Range Status   03/20/2024 5.44 3.40 - 10.80 10*3/mm3 Final   05/26/2021 7.73 4.5 - 11.0 10*3/uL Final     RBC   Date Value Ref Range Status   03/20/2024 4.36 4.14 - 5.80 10*6/mm3 Final   05/26/2021 4.94 4.5 - 5.9 10*6/uL Final     Hemoglobin   Date Value Ref Range Status   03/20/2024 13.8 13.0 - 17.7 g/dL Final   03/09/2023 14.4 13.7 - 17.5 Gram/dL Final   05/26/2021 15.9 13.5 - 17.5 g/dL Final     Hematocrit   Date Value Ref Range Status   03/20/2024 40.3 37.5 - 51.0 % Final   03/09/2023 41.2 40.1 - 51.0 % Final     Platelets   Date Value Ref Range Status   03/20/2024 174 140 - 450 10*3/mm3 Final   05/26/2021 208 140 - 440 10*3/uL Final          Assessment & Plan     *Gastric Adenocarcinoma  73-year-old male followed by primary care with hypertension, hyperlipidemia, type 2 diabetes, chronic venous stasis osteoarthritis and gout.  He had developed polyarticular joint pain in December and required hospitalization with diet alteration and IV diuretics.  He did improve fortunately but began to have abdominal discomfort thereafter leading to assessment of gallbladder, renal ultrasound with a potential cortical defect left kidney and a follow-up CT scan of abdomen pelvis that revealed hepatic steatosis and colonic diverticulosis.  The patient was seen by GI for screening colonoscopy and surveillance of adenomatous colonic polyps.  At this point  he is having upper abdominal symptoms thought to be gastritis though EGD was performed 4/27/2023 ultimately revealing an ulceration found on the posterior wall of the gastric body.  Biopsy was positive for poorly differentiated adenocarcinoma arising in a background of intestinal metaplasia and high-grade dysplasia, immunostain HER2 negative.  The patient's subsequent CT scan of chest and pelvis showed circumferential thickening of the lesser curvature of the stomach with a central area of ulceration and prominent gastrohepatic ligament lymphadenopathy concerning metastatic disease but no clear evidence of distant metastatic disease.  The patient is contacted by telephone 5/18/2023 and we plan to proceed with PET/CT next available, surgical assessment and follow-up in in CBC office subsequently.  The patient was more appropriately directed to general surgery initially and seen 5/26/2023 with consideration that he undergo peritoneal washing to evaluate for malignant cells.  This occurred through Dr. Mckenzie 5/30/2023-EGD and laparoscopy.  Study revealed through a retroflexed view the fundus and ulcerated tumor located along the lesser curvature immediately distal to the GE junction, 4 cm distal to the GE junction.  Washings were negative for definitive for malignancy.  It was concluded that he would need an esophagogastrectomy and the case was discussed with  who favored upfront chemotherapy given neoadjuvantly.  The patient was seen 6/5/2023 and it was determined that endoscopic ultrasound be obtained and if this revealed T1 tumor and he be a good candidate for initial surgery.  The patient is to be seen by Dr. Hodge concerning this.  The patient is seen on 6/13/2023 in office.  We have discussed, in detail with his son present, his current status including the need for EUS, subsequent port placement as we determine his tumor stage.  Available, currently, his PET/CT performed 5/3/2023 showing low-level  activity in the short segment of the lesser curvature at the site of his gastric malignancy, otherwise negative with very low-level metabolic activity in the abdomen and shotty nodes in the axilla and both groin regions.  The patient was now scheduled for EUS next week with results pending.  Pending the T stage he could well be a candidate for neoadjuvant chemotherapy but we find now that his peripheral neuropathy is both severe and longstanding and thus the use of certain chemotherapy regimens such as FLOT (frequently used) is of concern since peripheral neuropathy risk could be quite high.  Alternative FOLFOX could be utilized with oxaliplatin dose adjusted pending his degree of neuropathic symptoms.  He returned to laboratory undergoing exams including normal B12, ferritin, iron profile 10% saturation, CEA 4.56, hemoglobin A1c of 6.50.  Additionally underwent teaching for FOLFOX chemotherapy.  Dr. Varela has contacted me indicating that EUS is scheduled 6/21/2023 and he will schedule PowerPort 6/23/2023.  The patient proceeded to PET/CT performed 5/3/2023 showing low-level activity in the short segment of the lesser curvature at the site of his gastric malignancy, otherwise negative with very low-level metabolic activity in the abdomen and shotty nodes in the axilla and both groin regions.  The patient continued his staging undergoing EGD and EUS 6/21/2023 demonstrated gastric ulceration/mass in the mid to proximal portion lesser curvature of the stomach measuring 5-7 cm, EUS with gastric mass and invasion of muscularis propria with 1 area penetrating through the muscularis propria into the adventitia-likely T2 lesion, 3 small lymph nodes celiac axis/gastrohepatic region not overtly hyperechoic largest measuring 6 mm, normal pancreatic EUS and fatty liver with no metastatic lesions.  FNB x2 performed the largest lymph node negative cytologically.  He had seen  6/21/2023 with plans to approach a T2 lesion or  higher with chemotherapy neoadjuvant adjuvant settings-likely to require total gastrectomy, partial Jasiel Arnulfo esophagectomy and Steffany-en-Y reconstruction.  Seen back in office 6/27/2023 to proceed with chemotherapy-FOLFOX.  We discussed that he would be offered 4 cycles preoperative and 4 cycles postoperative pending tolerance.  His findings and course will be discussed with his surgeons as we proceed.  Patient reviewed back today following first cycle of FOLFOX.  He reports he is feeling well and has remained asymptomatic.  He has not noted any increased neuropathy from his baseline.  He denies any nausea, vomiting, increased arthralgias, cold sensation, or diarrhea.  He did have some mild constipation initially that resolved on its own.  He has remained active and continues to eat and drink well.  He does not feel that he needs any IV fluids today.  The patient is seen 7/11/2023 for his second cycle of FOLFOX chemotherapy.  7/25/2023 cycle 3 neoadjuvant FOLFOX (out of 4 preoperative cycles planned).  Tolerating well.  We went on to proceed with cycle 3 FOLFOX and plans for the patient to be seen 2 weeks later.  In the meantime he has been seen by Dr. Varela anticipating surgical resection to require total gastrectomy, partial [place] Arnulfo esophagectomy and Steffany-en-Y reconstruction.  Currently the patient is scheduled for a PET 8/14/2023 and review by Dr. Varela 8/17/2023.  The patient is next seen 8/9/2023.  He is ready to proceed with his fourth cycle of therapy and subsequent PET/CT.  His case has been discussed with  as well as to his plans described above.  The patient proceeded to PET/CT 8/14/2023 to document resolution of focal hypermetabolism along the gastric lesser curvature, subcentimeter upper abdominal retroperitoneal lymphadenopathy was too small to characterize as were unchanged 5 to 6 mm right lower lobe solid pulmonary nodules.  The patient was admitted 9/13-9/18 undergoing Noble total gastrectomy  with Steffany-en-Y, esophagojejunostomy and jejunal feeding tube placement.  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  Final stage: [id number].  The patient was seen back by Dr. Mckenzie 9/28/2023 with his incision healing and staples removed, J-tube had not been used and was removed patient was doing well enough to be referred back to proceed with his next portion of therapy-4 cycles of FOLFOX.  Patient seen 10/17/2023.  He is seen formally 10/16/2023.  Fortunately he is doing exceedingly well and, while he is working to manage his new diet, is confirmed with Dr. Mckenzie that we can proceed with her second phase of chemotherapy with 4 additional cycles of FOLFOX.  10/24/2023: Cycle 5 FOLFOX.  Was previously struggling with constipation, for the last week has had diarrhea up to 8 episodes daily.  Has not taken any antidiarrheals, reports he was unsure if he could take these following surgery.  Weight is down approximately 10 pounds since his last visit.  Admits he is not eating much, as he typically has diarrhea after eating.  Also admits he is not drinking a lot of fluid, about 20 ounces daily.  He is scheduled to see dee Friasitian following our visit today who plans to review dietary recommendations.  Recommended he start Imodium as needed for diarrhea.  If no improvement with Imodium plan to start Lomotil.  We will proceed with 500 cc normal saline prior to chemotherapy.   Magnesium checked today and normal at 1.9.  10/31/2023:  Diarrhea is significantly improved with Imodium use.  Typically taking 1 Imodium tablet every 3-4 days.  Feels his fluid intake has been doing good.  Proceed with 500 cc normal saline today while we await CMP results.  Creatinine normal, patient will not receive any further fluids.  11/7/2023: cycle 6 FOLFOX today.  Tolerating well.  11/15/2023: Seen for triage due to concerns for dehydration.  Creatinine 0.83, BUN 17.  Received 500 cc normal saline.  11/21/2023 C7 FOLFOX.     11/28/2023: 1 L IV normal saline today.  Patient feels improvement overall, especially related to fatigue after receiving fluids and would like to continue to do so today.    He is emotional today, feeling frustrated in regards to his eating restrictions.  This has been more difficult during the holidays.  We discussed getting set up with our supportive oncology team, and he is agreeable.  Referral will be placed today.  Patient is/12/5/2023 for fourth cycle of FOLFOX, improved performance status and mood.  Agrees to proceed with his final course of adjuvant therapy and subsequent repeat scans in 1 week.  Unfortunately the patient had a reaction during his administration of his last chemotherapy regimen leading to discontinuance of the treatment.  This led to gradual improvement of his symptoms with nausea completely resolving.  At this point we went on to have him complete the 5-FU portion of his treatment and he underwent repeat scans In and accelerated fashion with CT of chest, and, pelvis 12/8/2023 that is reviewed with him 12/11/2023 demonstrating small residual fluid within the abdomen and pelvis, small nodes into the distal esophagus slightly larger and thought to be reactive.  No additional abnormalities are present.  We have discussed maintaining his port with every 4 week flushes and have not seen back in 8 weeks and possibly rescan in approximately 3 months.  2/6/2024: Complains of loss of taste that occurred 3 weeks ago.  Has been  difficulty eating adequately because of this, however does force himself to eat.  On exam, he has thrush present so we will start him on nystatin.  Altered taste could also be related to chemotherapy, however did advise him it could improve after treating his thrush.  Otherwise he is feeling well without any new concerns.  Scans in 4 weeks with MD follow up to review scans in 5 weeks.  Follow-up CT scan 3/13/2024 with further reduced paraesophageal aortocaval lymph nodes, no suggestion of recurrent disease.  Plans made for follow-up scans in 6 months with port flush every 6 weeks    *Venous access  patient underwent Mediport placement 6/23/2023   Patient's port evaluated 7/10/2023 having been accessed for week after last visit.  Fortunately the area in question is not significantly inflamed nor fluctuant.  We have deaccessed, cleaned the site, reaccessed and plan to proceed with blood cultures pending.  Size reevaluated subsequent without additional inflammation    *Hypertension  Patient admits he has been noncompliant with his medications, does not take them regularly.    Blood pressure 10/26/2023 185/102.  He is fortunately asymptomatic.  Strongly encouraged him to resume blood pressure medications and close monitoring of blood pressure at home, reports he is agreeable with this.   10/31/2023: Blood pressure 176/102.  Has been compliant with his blood pressure medication over the last week.  Asymptomatic.  Checking his blood pressure at home and reports it typically runs 150s over 90s.  He plans to follow-up with his PCP in regards to blood pressure management.  Did advise if he becomes symptomatic he should present to the ER.  11/7/2023: BP today 167/91.  Recently started on clonidine patch.  Having some neck stiffness from the clonidine patch.  Follows again with his PCP Thursday for further management.  11/15/2023: Pressure remains elevated 160/88.  Denies any chest pain or shortness of breath.  500 mils of  normal saline given today for hydration while CMP pending.  11/21/2023: /98.  Asymptomatic.  Continues with clonidine patch.  11/28/2023: 157/91.,  Continues treatment evaluated 12/5/2023 2/6/2024: 174/103.  Asymptomatic.  Has not been compliant with his blood pressure medication, strongly encouraged him to take blood pressure medication regularly and monitor his BP at home.    *Hypokalemia  Potassium 2.9.  Patient again admits he has not been taking his oral potassium regularly.  Encouraged him to continue taking 20 mEq twice daily.  Also encouraged increasing potassium in his diet.  11/15/2023: Potassium is 3.4 today.  He has potassium chloride 20 mill equivalents he has been asked to take twice daily and encouraged to increase his potassium intake in his diet.  Does admit he has not been taking this consistently but does plan to do so.  We will continue to monitor.   11/21/2023: Potassium today still low at 3.3.  He again admits he is not taking his potassium replacement.  He will be provided with a handout on high potassium foods, encouraged him to increase his dietary potassium if he will not take oral potassium supplement.   11/28/2023: Potassium 3.7.  Repeat 12/5/2023 3.1, diet adjusted, recheck 12/7/2023 at 3.3  2/6/2024: Potassium 3.9.    *Weight loss  11/15/2023: Patient continues to struggle with his appetite and has been losing weight.    Enterade provided to patient to start in hopes of reducing GI symptoms related to chemotherapy.  11/21/2023: Weight improved 174 pounds.  Diarrhea has improved with Enterade.  Continue follow up with oncology dietician.   2/6/2024: Reports difficulty with eating adequately due to taste changes that started abruptly 3 weeks ago.  He has thrush present on exam which will be treated with nystatin.      *Oropharyngeal candidiasis-present today on exam.  Patient has been complaining of decreased taste for the last 3 weeks.  Start nystatin 4 times daily x 7  days.  Improved though persistent 3/20/2024, trial of fluconazole x 7 days    PLAN:   Discontinue nystatin, start fluconazole 100 mg p.o. daily x 7, E scribed to pharmacy  Continue follow-up with oncology dietitian.  Continue Imodium as needed for diarrhea  Continue current hypertensive regimen and follow-up with PCP.  Every 6 week port flush, scan order placed today-23 weeks  24 weeks follow-up MD, CBC, CMP  I spent 43 minutes caring for Mark on this date of service. This time includes time spent by me in the following activities: preparing for the visit, reviewing tests, obtaining and/or reviewing a separately obtained history, performing a medically appropriate examination and/or evaluation, counseling and educating the patient/family/caregiver, ordering medications, tests, or procedures, referring and communicating with other health care professionals, documenting information in the medical record, independently interpreting results and communicating that information with the patient/family/caregiver, and care coordination.     Mikel Ludwig MD  03/20/24

## 2024-03-20 ENCOUNTER — INFUSION (OUTPATIENT)
Dept: ONCOLOGY | Facility: HOSPITAL | Age: 74
End: 2024-03-20
Payer: MEDICARE

## 2024-03-20 ENCOUNTER — OFFICE VISIT (OUTPATIENT)
Dept: ONCOLOGY | Facility: CLINIC | Age: 74
End: 2024-03-20
Payer: MEDICARE

## 2024-03-20 VITALS
HEIGHT: 68 IN | WEIGHT: 166.3 LBS | SYSTOLIC BLOOD PRESSURE: 174 MMHG | OXYGEN SATURATION: 100 % | RESPIRATION RATE: 16 BRPM | HEART RATE: 77 BPM | BODY MASS INDEX: 25.2 KG/M2 | TEMPERATURE: 98 F | DIASTOLIC BLOOD PRESSURE: 104 MMHG

## 2024-03-20 DIAGNOSIS — Z45.2 FITTING AND ADJUSTMENT OF VASCULAR CATHETER: Primary | ICD-10-CM

## 2024-03-20 DIAGNOSIS — C16.9 MALIGNANT NEOPLASM OF STOMACH, UNSPECIFIED LOCATION: Primary | ICD-10-CM

## 2024-03-20 DIAGNOSIS — C16.9 MALIGNANT NEOPLASM OF STOMACH, UNSPECIFIED LOCATION: ICD-10-CM

## 2024-03-20 DIAGNOSIS — C16.9 GASTRIC ADENOCARCINOMA: ICD-10-CM

## 2024-03-20 LAB
ALBUMIN SERPL-MCNC: 3.9 G/DL (ref 3.5–5.2)
ALBUMIN/GLOB SERPL: 1.6 G/DL
ALP SERPL-CCNC: 97 U/L (ref 39–117)
ALT SERPL W P-5'-P-CCNC: 19 U/L (ref 1–41)
ANION GAP SERPL CALCULATED.3IONS-SCNC: 12 MMOL/L (ref 5–15)
AST SERPL-CCNC: 28 U/L (ref 1–40)
BASOPHILS # BLD AUTO: 0.02 10*3/MM3 (ref 0–0.2)
BASOPHILS NFR BLD AUTO: 0.4 % (ref 0–1.5)
BILIRUB SERPL-MCNC: 0.6 MG/DL (ref 0–1.2)
BUN SERPL-MCNC: 9 MG/DL (ref 8–23)
BUN/CREAT SERPL: 10.6 (ref 7–25)
CALCIUM SPEC-SCNC: 9.2 MG/DL (ref 8.6–10.5)
CHLORIDE SERPL-SCNC: 105 MMOL/L (ref 98–107)
CO2 SERPL-SCNC: 24 MMOL/L (ref 22–29)
CREAT SERPL-MCNC: 0.85 MG/DL (ref 0.76–1.27)
DEPRECATED RDW RBC AUTO: 43.9 FL (ref 37–54)
EGFRCR SERPLBLD CKD-EPI 2021: 92.3 ML/MIN/1.73
EOSINOPHIL # BLD AUTO: 0.07 10*3/MM3 (ref 0–0.4)
EOSINOPHIL NFR BLD AUTO: 1.3 % (ref 0.3–6.2)
ERYTHROCYTE [DISTWIDTH] IN BLOOD BY AUTOMATED COUNT: 13 % (ref 12.3–15.4)
GLOBULIN UR ELPH-MCNC: 2.4 GM/DL
GLUCOSE SERPL-MCNC: 249 MG/DL (ref 65–99)
HCT VFR BLD AUTO: 40.3 % (ref 37.5–51)
HGB BLD-MCNC: 13.8 G/DL (ref 13–17.7)
IMM GRANULOCYTES # BLD AUTO: 0.01 10*3/MM3 (ref 0–0.05)
IMM GRANULOCYTES NFR BLD AUTO: 0.2 % (ref 0–0.5)
LYMPHOCYTES # BLD AUTO: 1.79 10*3/MM3 (ref 0.7–3.1)
LYMPHOCYTES NFR BLD AUTO: 32.9 % (ref 19.6–45.3)
MCH RBC QN AUTO: 31.7 PG (ref 26.6–33)
MCHC RBC AUTO-ENTMCNC: 34.2 G/DL (ref 31.5–35.7)
MCV RBC AUTO: 92.4 FL (ref 79–97)
MONOCYTES # BLD AUTO: 0.33 10*3/MM3 (ref 0.1–0.9)
MONOCYTES NFR BLD AUTO: 6.1 % (ref 5–12)
NEUTROPHILS NFR BLD AUTO: 3.22 10*3/MM3 (ref 1.7–7)
NEUTROPHILS NFR BLD AUTO: 59.1 % (ref 42.7–76)
NRBC BLD AUTO-RTO: 0 /100 WBC (ref 0–0.2)
PLATELET # BLD AUTO: 174 10*3/MM3 (ref 140–450)
PMV BLD AUTO: 10.6 FL (ref 6–12)
POTASSIUM SERPL-SCNC: 3.6 MMOL/L (ref 3.5–5.2)
PROT SERPL-MCNC: 6.3 G/DL (ref 6–8.5)
RBC # BLD AUTO: 4.36 10*6/MM3 (ref 4.14–5.8)
SODIUM SERPL-SCNC: 141 MMOL/L (ref 136–145)
WBC NRBC COR # BLD AUTO: 5.44 10*3/MM3 (ref 3.4–10.8)

## 2024-03-20 PROCEDURE — 36591 DRAW BLOOD OFF VENOUS DEVICE: CPT

## 2024-03-20 PROCEDURE — 80053 COMPREHEN METABOLIC PANEL: CPT

## 2024-03-20 PROCEDURE — 85025 COMPLETE CBC W/AUTO DIFF WBC: CPT

## 2024-03-20 PROCEDURE — 25010000002 HEPARIN LOCK FLUSH PER 10 UNITS: Performed by: INTERNAL MEDICINE

## 2024-03-20 RX ORDER — SODIUM CHLORIDE 0.9 % (FLUSH) 0.9 %
10 SYRINGE (ML) INJECTION AS NEEDED
OUTPATIENT
Start: 2024-03-20

## 2024-03-20 RX ORDER — HEPARIN SODIUM (PORCINE) LOCK FLUSH IV SOLN 100 UNIT/ML 100 UNIT/ML
500 SOLUTION INTRAVENOUS AS NEEDED
Status: DISCONTINUED | OUTPATIENT
Start: 2024-03-20 | End: 2024-03-20 | Stop reason: HOSPADM

## 2024-03-20 RX ORDER — HEPARIN SODIUM (PORCINE) LOCK FLUSH IV SOLN 100 UNIT/ML 100 UNIT/ML
500 SOLUTION INTRAVENOUS AS NEEDED
OUTPATIENT
Start: 2024-03-20

## 2024-03-20 RX ORDER — SODIUM CHLORIDE 0.9 % (FLUSH) 0.9 %
10 SYRINGE (ML) INJECTION AS NEEDED
Status: DISCONTINUED | OUTPATIENT
Start: 2024-03-20 | End: 2024-03-20 | Stop reason: HOSPADM

## 2024-03-20 RX ORDER — FLUCONAZOLE 100 MG/1
100 TABLET ORAL DAILY
Qty: 7 TABLET | Refills: 2 | Status: SHIPPED | OUTPATIENT
Start: 2024-03-20

## 2024-03-20 RX ADMIN — Medication 500 UNITS: at 10:16

## 2024-03-20 RX ADMIN — Medication 10 ML: at 10:16

## 2024-05-01 ENCOUNTER — INFUSION (OUTPATIENT)
Dept: ONCOLOGY | Facility: HOSPITAL | Age: 74
End: 2024-05-01
Payer: MEDICARE

## 2024-05-01 DIAGNOSIS — Z45.2 FITTING AND ADJUSTMENT OF VASCULAR CATHETER: Primary | ICD-10-CM

## 2024-05-01 PROCEDURE — 96523 IRRIG DRUG DELIVERY DEVICE: CPT

## 2024-05-01 PROCEDURE — 25010000002 HEPARIN LOCK FLUSH PER 10 UNITS: Performed by: INTERNAL MEDICINE

## 2024-05-01 RX ORDER — HEPARIN SODIUM (PORCINE) LOCK FLUSH IV SOLN 100 UNIT/ML 100 UNIT/ML
500 SOLUTION INTRAVENOUS AS NEEDED
Status: DISCONTINUED | OUTPATIENT
Start: 2024-05-01 | End: 2024-05-01 | Stop reason: HOSPADM

## 2024-05-01 RX ORDER — SODIUM CHLORIDE 0.9 % (FLUSH) 0.9 %
10 SYRINGE (ML) INJECTION AS NEEDED
Status: DISCONTINUED | OUTPATIENT
Start: 2024-05-01 | End: 2024-05-01 | Stop reason: HOSPADM

## 2024-05-01 RX ORDER — HEPARIN SODIUM (PORCINE) LOCK FLUSH IV SOLN 100 UNIT/ML 100 UNIT/ML
500 SOLUTION INTRAVENOUS AS NEEDED
OUTPATIENT
Start: 2024-05-01

## 2024-05-01 RX ORDER — SODIUM CHLORIDE 0.9 % (FLUSH) 0.9 %
10 SYRINGE (ML) INJECTION AS NEEDED
OUTPATIENT
Start: 2024-05-01

## 2024-05-01 RX ADMIN — Medication 10 ML: at 13:12

## 2024-05-01 RX ADMIN — Medication 500 UNITS: at 13:12

## 2024-06-12 ENCOUNTER — INFUSION (OUTPATIENT)
Dept: ONCOLOGY | Facility: HOSPITAL | Age: 74
End: 2024-06-12
Payer: MEDICARE

## 2024-06-12 DIAGNOSIS — Z45.2 FITTING AND ADJUSTMENT OF VASCULAR CATHETER: Primary | ICD-10-CM

## 2024-06-12 PROCEDURE — 96523 IRRIG DRUG DELIVERY DEVICE: CPT

## 2024-06-12 PROCEDURE — 25010000002 HEPARIN LOCK FLUSH PER 10 UNITS: Performed by: INTERNAL MEDICINE

## 2024-06-12 RX ORDER — HEPARIN SODIUM (PORCINE) LOCK FLUSH IV SOLN 100 UNIT/ML 100 UNIT/ML
500 SOLUTION INTRAVENOUS AS NEEDED
Status: DISCONTINUED | OUTPATIENT
Start: 2024-06-12 | End: 2024-06-12 | Stop reason: HOSPADM

## 2024-06-12 RX ORDER — HEPARIN SODIUM (PORCINE) LOCK FLUSH IV SOLN 100 UNIT/ML 100 UNIT/ML
500 SOLUTION INTRAVENOUS AS NEEDED
OUTPATIENT
Start: 2024-06-12

## 2024-06-12 RX ORDER — SODIUM CHLORIDE 0.9 % (FLUSH) 0.9 %
10 SYRINGE (ML) INJECTION AS NEEDED
OUTPATIENT
Start: 2024-06-12

## 2024-06-12 RX ORDER — SODIUM CHLORIDE 0.9 % (FLUSH) 0.9 %
10 SYRINGE (ML) INJECTION AS NEEDED
Status: DISCONTINUED | OUTPATIENT
Start: 2024-06-12 | End: 2024-06-12 | Stop reason: HOSPADM

## 2024-06-12 RX ADMIN — Medication 10 ML: at 13:00

## 2024-06-12 RX ADMIN — Medication 500 UNITS: at 13:00

## 2024-07-24 ENCOUNTER — INFUSION (OUTPATIENT)
Dept: ONCOLOGY | Facility: HOSPITAL | Age: 74
End: 2024-07-24
Payer: MEDICARE

## 2024-07-24 DIAGNOSIS — Z45.2 FITTING AND ADJUSTMENT OF VASCULAR CATHETER: Primary | ICD-10-CM

## 2024-07-24 PROCEDURE — 25010000002 HEPARIN LOCK FLUSH PER 10 UNITS: Performed by: NURSE PRACTITIONER

## 2024-07-24 PROCEDURE — 96523 IRRIG DRUG DELIVERY DEVICE: CPT

## 2024-07-24 RX ORDER — HEPARIN SODIUM (PORCINE) LOCK FLUSH IV SOLN 100 UNIT/ML 100 UNIT/ML
500 SOLUTION INTRAVENOUS AS NEEDED
Status: DISCONTINUED | OUTPATIENT
Start: 2024-07-24 | End: 2024-07-24 | Stop reason: HOSPADM

## 2024-07-24 RX ORDER — SODIUM CHLORIDE 0.9 % (FLUSH) 0.9 %
10 SYRINGE (ML) INJECTION AS NEEDED
Status: DISCONTINUED | OUTPATIENT
Start: 2024-07-24 | End: 2024-07-24 | Stop reason: HOSPADM

## 2024-07-24 RX ADMIN — Medication 500 UNITS: at 13:05

## 2024-07-24 RX ADMIN — Medication 10 ML: at 13:05

## 2024-08-27 ENCOUNTER — HOSPITAL ENCOUNTER (OUTPATIENT)
Dept: CT IMAGING | Facility: HOSPITAL | Age: 74
Discharge: HOME | End: 2024-08-27
Admitting: INTERNAL MEDICINE
Payer: MEDICARE

## 2024-08-27 ENCOUNTER — INFUSION (OUTPATIENT)
Dept: ONCOLOGY | Facility: HOSPITAL | Age: 74
End: 2024-08-27
Payer: MEDICARE

## 2024-08-27 DIAGNOSIS — C16.9 MALIGNANT NEOPLASM OF STOMACH, UNSPECIFIED LOCATION: ICD-10-CM

## 2024-08-27 DIAGNOSIS — Z45.2 FITTING AND ADJUSTMENT OF VASCULAR CATHETER: Primary | ICD-10-CM

## 2024-08-27 LAB
ALBUMIN SERPL-MCNC: 4.1 G/DL (ref 3.5–5.2)
ALBUMIN/GLOB SERPL: 1.5 G/DL
ALP SERPL-CCNC: 109 U/L (ref 39–117)
ALT SERPL W P-5'-P-CCNC: 34 U/L (ref 1–41)
ANION GAP SERPL CALCULATED.3IONS-SCNC: 10 MMOL/L (ref 5–15)
AST SERPL-CCNC: 34 U/L (ref 1–40)
BASOPHILS # BLD AUTO: 0.02 10*3/MM3 (ref 0–0.2)
BASOPHILS NFR BLD AUTO: 0.3 % (ref 0–1.5)
BILIRUB SERPL-MCNC: 0.7 MG/DL (ref 0–1.2)
BUN SERPL-MCNC: 11 MG/DL (ref 8–23)
BUN/CREAT SERPL: 13.8 (ref 7–25)
CALCIUM SPEC-SCNC: 9.2 MG/DL (ref 8.6–10.5)
CHLORIDE SERPL-SCNC: 104 MMOL/L (ref 98–107)
CO2 SERPL-SCNC: 27 MMOL/L (ref 22–29)
CREAT SERPL-MCNC: 0.8 MG/DL (ref 0.76–1.27)
DEPRECATED RDW RBC AUTO: 45.5 FL (ref 37–54)
EGFRCR SERPLBLD CKD-EPI 2021: 93.4 ML/MIN/1.73
EOSINOPHIL # BLD AUTO: 0.06 10*3/MM3 (ref 0–0.4)
EOSINOPHIL NFR BLD AUTO: 0.9 % (ref 0.3–6.2)
ERYTHROCYTE [DISTWIDTH] IN BLOOD BY AUTOMATED COUNT: 13.8 % (ref 12.3–15.4)
GLOBULIN UR ELPH-MCNC: 2.7 GM/DL
GLUCOSE SERPL-MCNC: 92 MG/DL (ref 65–99)
HCT VFR BLD AUTO: 39.9 % (ref 37.5–51)
HGB BLD-MCNC: 13.4 G/DL (ref 13–17.7)
IMM GRANULOCYTES # BLD AUTO: 0.01 10*3/MM3 (ref 0–0.05)
IMM GRANULOCYTES NFR BLD AUTO: 0.2 % (ref 0–0.5)
LYMPHOCYTES # BLD AUTO: 2.19 10*3/MM3 (ref 0.7–3.1)
LYMPHOCYTES NFR BLD AUTO: 33.4 % (ref 19.6–45.3)
MCH RBC QN AUTO: 30.2 PG (ref 26.6–33)
MCHC RBC AUTO-ENTMCNC: 33.6 G/DL (ref 31.5–35.7)
MCV RBC AUTO: 90.1 FL (ref 79–97)
MONOCYTES # BLD AUTO: 0.46 10*3/MM3 (ref 0.1–0.9)
MONOCYTES NFR BLD AUTO: 7 % (ref 5–12)
NEUTROPHILS NFR BLD AUTO: 3.81 10*3/MM3 (ref 1.7–7)
NEUTROPHILS NFR BLD AUTO: 58.2 % (ref 42.7–76)
NRBC BLD AUTO-RTO: 0 /100 WBC (ref 0–0.2)
PLATELET # BLD AUTO: 160 10*3/MM3 (ref 140–450)
PMV BLD AUTO: 10.8 FL (ref 6–12)
POTASSIUM SERPL-SCNC: 3 MMOL/L (ref 3.5–5.2)
PROT SERPL-MCNC: 6.8 G/DL (ref 6–8.5)
RBC # BLD AUTO: 4.43 10*6/MM3 (ref 4.14–5.8)
SODIUM SERPL-SCNC: 141 MMOL/L (ref 136–145)
WBC NRBC COR # BLD AUTO: 6.55 10*3/MM3 (ref 3.4–10.8)

## 2024-08-27 PROCEDURE — 85025 COMPLETE CBC W/AUTO DIFF WBC: CPT | Performed by: INTERNAL MEDICINE

## 2024-08-27 PROCEDURE — 74177 CT ABD & PELVIS W/CONTRAST: CPT

## 2024-08-27 PROCEDURE — 25510000001 IOPAMIDOL 61 % SOLUTION: Performed by: INTERNAL MEDICINE

## 2024-08-27 PROCEDURE — 80053 COMPREHEN METABOLIC PANEL: CPT | Performed by: INTERNAL MEDICINE

## 2024-08-27 PROCEDURE — 71260 CT THORAX DX C+: CPT

## 2024-08-27 PROCEDURE — 0 DIATRIZOATE MEGLUMINE & SODIUM PER 1 ML: Performed by: INTERNAL MEDICINE

## 2024-08-27 RX ORDER — SODIUM CHLORIDE 0.9 % (FLUSH) 0.9 %
10 SYRINGE (ML) INJECTION AS NEEDED
Status: DISCONTINUED | OUTPATIENT
Start: 2024-08-27 | End: 2024-08-27 | Stop reason: HOSPADM

## 2024-08-27 RX ORDER — HEPARIN SODIUM (PORCINE) LOCK FLUSH IV SOLN 100 UNIT/ML 100 UNIT/ML
500 SOLUTION INTRAVENOUS AS NEEDED
Status: CANCELLED | OUTPATIENT
Start: 2024-08-27

## 2024-08-27 RX ORDER — IOPAMIDOL 612 MG/ML
100 INJECTION, SOLUTION INTRAVASCULAR
Status: COMPLETED | OUTPATIENT
Start: 2024-08-27 | End: 2024-08-27

## 2024-08-27 RX ORDER — SODIUM CHLORIDE 0.9 % (FLUSH) 0.9 %
10 SYRINGE (ML) INJECTION AS NEEDED
OUTPATIENT
Start: 2024-08-27

## 2024-08-27 RX ORDER — HEPARIN SODIUM (PORCINE) LOCK FLUSH IV SOLN 100 UNIT/ML 100 UNIT/ML
500 SOLUTION INTRAVENOUS AS NEEDED
OUTPATIENT
Start: 2024-08-27

## 2024-08-27 RX ADMIN — DIATRIZOATE MEGLUMINE AND DIATRIZOATE SODIUM 30 ML: 660; 100 LIQUID ORAL; RECTAL at 09:30

## 2024-08-27 RX ADMIN — IOPAMIDOL 85 ML: 612 INJECTION, SOLUTION INTRAVENOUS at 10:33

## 2024-08-27 RX ADMIN — Medication 10 ML: at 09:56

## 2024-09-02 NOTE — PROGRESS NOTES
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 (MYCOSTATIN) 100,000 unit/mL suspension Swish and swallow 5 mL 4 (Four) Times a Day. (Patient not taking: Reported on 9/3/2024) 473 mL 1    rOPINIRole (REQUIP) 2 MG tablet Take 1 tablet by mouth Every 12 (Twelve) Hours. (Patient not taking: Reported on 9/3/2024) 60 tablet 3    valsartan (DIOVAN) 160 MG tablet Take 1 tablet by mouth Daily. (Patient not taking: Reported on 9/3/2024) 90 tablet 3     No current facility-administered medications on file prior to visit.        ALLERGIES:  No Known Allergies     Social History     Socioeconomic History    Marital status:    Tobacco Use    Smoking status: Never     Passive exposure: Never    Smokeless tobacco: Never   Vaping Use    Vaping status: Never Used   Substance and Sexual Activity    Alcohol use: Yes     Comment: once a month  one  Samantha/with Mexican Dinner    Drug use: Yes     Types: Marijuana     Comment: thc gummies for sleep    Sexual activity: Not Currently     Partners: Female     Birth control/protection: Vasectomy        Family History   Problem Relation Age of Onset    Malig Hyperthermia Neg Hx         Review of Systems   Constitutional:  Negative for activity change (Unchanged from his baseline) and fatigue (Unchanged from his baseline).   HENT: Negative.     Eyes: Negative.    Respiratory: Negative.     Cardiovascular: Negative.    Gastrointestinal:  Negative for abdominal pain  "(Improved, multiple small meals per day to prevent additional discomfort).   Genitourinary: Negative.    Musculoskeletal:  Positive for arthralgias (Unchanged from his baseline).   Skin: Negative.    Allergic/Immunologic: Negative.    Neurological:  Positive for numbness (Patient describes 30 years of peripheral neuropathy-severe-this has not worsen with chemotherapy and is currently stable.).   Psychiatric/Behavioral: Negative.     Current    Objective     Vitals:    09/03/24 1434   BP: (!) 186/93   Pulse: 55   Resp: 14   Temp: 97.6 °F (36.4 °C)   SpO2: 99%   Weight: 71.4 kg (157 lb 8 oz)   Height: 172.7 cm (67.99\")   PainSc: 0-No pain                 9/3/2024     2:34 PM   Current Status   ECOG score 0       Physical Exam  Vitals and nursing note reviewed.   Constitutional:       Appearance: Normal appearance. He is well-developed.   HENT:      Head: Normocephalic and atraumatic.      Nose: Nose normal.      Mouth/Throat:      Comments: Residual candidiasis primarily tongue based  Eyes:      Pupils: Pupils are equal, round, and reactive to light.   Cardiovascular:      Rate and Rhythm: Normal rate and regular rhythm.      Heart sounds: Normal heart sounds.   Pulmonary:      Effort: Pulmonary effort is normal. No respiratory distress.      Breath sounds: Normal breath sounds. No wheezing, rhonchi or rales.   Abdominal:      General: Bowel sounds are normal. There is no distension.      Palpations: Abdomen is soft.      Tenderness: There is no abdominal tenderness.   Musculoskeletal:         General: Normal range of motion.      Cervical back: Normal range of motion and neck supple.   Skin:     General: Skin is warm and dry.   Neurological:      Mental Status: He is alert and oriented to person, place, and time.   Psychiatric:         Behavior: Behavior normal.           RECENT LABS:  Hematology WBC   Date Value Ref Range Status   08/27/2024 6.55 3.40 - 10.80 10*3/mm3 Final   05/26/2021 7.73 4.5 - 11.0 10*3/uL Final "     RBC   Date Value Ref Range Status   08/27/2024 4.43 4.14 - 5.80 10*6/mm3 Final   05/26/2021 4.94 4.5 - 5.9 10*6/uL Final     Hemoglobin   Date Value Ref Range Status   08/27/2024 13.4 13.0 - 17.7 g/dL Final   03/09/2023 14.4 13.7 - 17.5 Gram/dL Final   05/26/2021 15.9 13.5 - 17.5 g/dL Final     Hematocrit   Date Value Ref Range Status   08/27/2024 39.9 37.5 - 51.0 % Final   03/09/2023 41.2 40.1 - 51.0 % Final     Platelets   Date Value Ref Range Status   08/27/2024 160 140 - 450 10*3/mm3 Final   05/26/2021 208 140 - 440 10*3/uL Final          Assessment & Plan     *Gastric Adenocarcinoma  74-year-old male followed by primary care with hypertension, hyperlipidemia, type 2 diabetes, chronic venous stasis osteoarthritis and gout.  He had developed polyarticular joint pain in December and required hospitalization with diet alteration and IV diuretics.  He did improve fortunately but began to have abdominal discomfort thereafter leading to assessment of gallbladder, renal ultrasound with a potential cortical defect left kidney and a follow-up CT scan of abdomen pelvis that revealed hepatic steatosis and colonic diverticulosis.  The patient was seen by GI for screening colonoscopy and surveillance of adenomatous colonic polyps.  At this point he is having upper abdominal symptoms thought to be gastritis though EGD was performed 4/27/2023 ultimately revealing an ulceration found on the posterior wall of the gastric body.  Biopsy was positive for poorly differentiated adenocarcinoma arising in a background of intestinal metaplasia and high-grade dysplasia, immunostain HER2 negative.  The patient's subsequent CT scan of chest and pelvis showed circumferential thickening of the lesser curvature of the stomach with a central area of ulceration and prominent gastrohepatic ligament lymphadenopathy concerning metastatic disease but no clear evidence of distant metastatic disease.  The patient is contacted by telephone  5/18/2023 and we plan to proceed with PET/CT next available, surgical assessment and follow-up in in CBC office subsequently.  The patient was more appropriately directed to general surgery initially and seen 5/26/2023 with consideration that he undergo peritoneal washing to evaluate for malignant cells.  This occurred through Dr. Mckenzie 5/30/2023-EGD and laparoscopy.  Study revealed through a retroflexed view the fundus and ulcerated tumor located along the lesser curvature immediately distal to the GE junction, 4 cm distal to the GE junction.  Washings were negative for definitive for malignancy.  It was concluded that he would need an esophagogastrectomy and the case was discussed with  who favored upfront chemotherapy given neoadjuvantly.  The patient was seen 6/5/2023 and it was determined that endoscopic ultrasound be obtained and if this revealed T1 tumor and he be a good candidate for initial surgery.  The patient is to be seen by Dr. Hodge concerning this.  The patient is seen on 6/13/2023 in office.  We have discussed, in detail with his son present, his current status including the need for EUS, subsequent port placement as we determine his tumor stage.  Available, currently, his PET/CT performed 5/3/2023 showing low-level activity in the short segment of the lesser curvature at the site of his gastric malignancy, otherwise negative with very low-level metabolic activity in the abdomen and shotty nodes in the axilla and both groin regions.  The patient was now scheduled for EUS next week with results pending.  Pending the T stage he could well be a candidate for neoadjuvant chemotherapy but we find now that his peripheral neuropathy is both severe and longstanding and thus the use of certain chemotherapy regimens such as FLOT (frequently used) is of concern since peripheral neuropathy risk could be quite high.  Alternative FOLFOX could be utilized with oxaliplatin dose adjusted pending his degree  of neuropathic symptoms.  He returned to laboratory undergoing exams including normal B12, ferritin, iron profile 10% saturation, CEA 4.56, hemoglobin A1c of 6.50.  Additionally underwent teaching for FOLFOX chemotherapy.  Dr. Varela has contacted me indicating that EUS is scheduled 6/21/2023 and he will schedule PowerPort 6/23/2023.  The patient proceeded to PET/CT performed 5/3/2023 showing low-level activity in the short segment of the lesser curvature at the site of his gastric malignancy, otherwise negative with very low-level metabolic activity in the abdomen and shotty nodes in the axilla and both groin regions.  The patient continued his staging undergoing EGD and EUS 6/21/2023 demonstrated gastric ulceration/mass in the mid to proximal portion lesser curvature of the stomach measuring 5-7 cm, EUS with gastric mass and invasion of muscularis propria with 1 area penetrating through the muscularis propria into the adventitia-likely T2 lesion, 3 small lymph nodes celiac axis/gastrohepatic region not overtly hyperechoic largest measuring 6 mm, normal pancreatic EUS and fatty liver with no metastatic lesions.  FNB x2 performed the largest lymph node negative cytologically.  He had seen  6/21/2023 with plans to approach a T2 lesion or higher with chemotherapy neoadjuvant adjuvant settings-likely to require total gastrectomy, partial Jasiel Arnulfo esophagectomy and Steffany-en-Y reconstruction.  Seen back in office 6/27/2023 to proceed with chemotherapy-FOLFOX.  We discussed that he would be offered 4 cycles preoperative and 4 cycles postoperative pending tolerance.  His findings and course will be discussed with his surgeons as we proceed.  Patient reviewed back today following first cycle of FOLFOX.  He reports he is feeling well and has remained asymptomatic.  He has not noted any increased neuropathy from his baseline.  He denies any nausea, vomiting, increased arthralgias, cold sensation, or diarrhea.  He did have  some mild constipation initially that resolved on its own.  He has remained active and continues to eat and drink well.  He does not feel that he needs any IV fluids today.  The patient is seen 7/11/2023 for his second cycle of FOLFOX chemotherapy.  7/25/2023 cycle 3 neoadjuvant FOLFOX (out of 4 preoperative cycles planned).  Tolerating well.  We went on to proceed with cycle 3 FOLFOX and plans for the patient to be seen 2 weeks later.  In the meantime he has been seen by Dr. Varela anticipating surgical resection to require total gastrectomy, partial Wittman Arnulfo esophagectomy and Steffany-en-Y reconstruction.  Currently the patient is scheduled for a PET 8/14/2023 and review by Dr. Varela 8/17/2023.  The patient is next seen 8/9/2023.  He is ready to proceed with his fourth cycle of therapy and subsequent PET/CT.  His case has been discussed with  as well as to his plans described above.  The patient proceeded to PET/CT 8/14/2023 to document resolution of focal hypermetabolism along the gastric lesser curvature, subcentimeter upper abdominal retroperitoneal lymphadenopathy was too small to characterize as were unchanged 5 to 6 mm right lower lobe solid pulmonary nodules.  The patient was admitted 9/13-9/18 undergoing Noble total gastrectomy with Steffany-en-Y, esophagojejunostomy and jejunal feeding tube placement.  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  Final stage: [id number].  The patient was seen back by Dr. Mckenzie 9/28/2023  with his incision healing and staples removed, J-tube had not been used and was removed patient was doing well enough to be referred back to proceed with his next portion of therapy-4 cycles of FOLFOX.  Patient seen 10/17/2023.  He is seen formally 10/16/2023.  Fortunately he is doing exceedingly well and, while he is working to manage his new diet, is confirmed with Dr. Mckenzie that we can proceed with her second phase of chemotherapy with 4 additional cycles of FOLFOX.  10/24/2023: Cycle 5 FOLFOX.  Was previously struggling with constipation, for the last week has had diarrhea up to 8 episodes daily.  Has not taken any antidiarrheals, reports he was unsure if he could take these following surgery.  Weight is down approximately 10 pounds since his last visit.  Admits he is not eating much, as he typically has diarrhea after eating.  Also admits he is not drinking a lot of fluid, about 20 ounces daily.  He is scheduled to see [facility] dietitian following our visit today who plans to review dietary recommendations.  Recommended he start Imodium as needed for diarrhea.  If no improvement with Imodium plan to start Lomotil.  We will proceed with 500 cc normal saline prior to chemotherapy.   Magnesium checked today and normal at 1.9.  10/31/2023: Diarrhea is significantly improved with Imodium use.  Typically taking 1 Imodium tablet every 3-4 days.  Feels his fluid intake has been doing good.  Proceed with 500 cc normal saline today while we await CMP results.  Creatinine normal, patient will not receive any further fluids.  11/7/2023: cycle 6 FOLFOX today.  Tolerating well.  11/15/2023: Seen for triage due to concerns for dehydration.  Creatinine 0.83, BUN 17.  Received 500 cc normal saline.  11/21/2023 C7 FOLFOX.     11/28/2023: 1 L IV normal saline today.  Patient feels improvement overall, especially related to fatigue after receiving fluids and would like to continue to do so today.    He is emotional today, feeling  frustrated in regards to his eating restrictions.  This has been more difficult during the holidays.  We discussed getting set up with our supportive oncology team, and he is agreeable.  Referral will be placed today.  Patient is/12/5/2023 for fourth cycle of FOLFOX, improved performance status and mood.  Agrees to proceed with his final course of adjuvant therapy and subsequent repeat scans in 1 week.  Unfortunately the patient had a reaction during his administration of his last chemotherapy regimen leading to discontinuance of the treatment.  This led to gradual improvement of his symptoms with nausea completely resolving.  At this point we went on to have him complete the 5-FU portion of his treatment and he underwent repeat scans In and accelerated fashion with CT of chest, and, pelvis 12/8/2023 that is reviewed with him 12/11/2023 demonstrating small residual fluid within the abdomen and pelvis, small nodes into the distal esophagus slightly larger and thought to be reactive.  No additional abnormalities are present.  We have discussed maintaining his port with every 4 week flushes and have not seen back in 8 weeks and possibly rescan in approximately 3 months.  2/6/2024: Complains of loss of taste that occurred 3 weeks ago.  Has been difficulty eating adequately because of this, however does force himself to eat.  On exam, he has thrush present so we will start him on nystatin.  Altered taste could also be related to chemotherapy, however did advise him it could improve after treating his thrush.  Otherwise he is feeling well without any new concerns.  Scans in 4 weeks with MD follow up to review scans in 5 weeks.  Follow-up CT scan 3/13/2024 with further reduced paraesophageal aortocaval lymph nodes, no suggestion of recurrent disease.  Plans made for follow-up scans in 6 months with port flush every 6 weeks.  He is seen 9/3/2024 slowly having gained weight by scheduling of his meals recognizing cannot eat  like he did previously and with a reasonably good performance status.  We discussed the above scans indicating on a follow-up exam be necessary.  He also describes the fact that he will often use a massager on his chest to help his port flow more easily.  While this is not a commonly used practice we will research whether there is any utility the with the concern that a fibrin sheath or possible catheter fracture would be a risk.  Plan to reassess by repeat scans in 20 weeks    *Venous access  patient underwent Mediport placement 6/23/2023   Patient's port evaluated 7/10/2023 having been accessed for week after last visit.  Fortunately the area in question is not significantly inflamed nor fluctuant.  We have deaccessed, cleaned the site, reaccessed and plan to proceed with blood cultures pending.  Size reevaluated subsequent without additional inflammation  Given the the patient's Mediport still works properly.  He is advocating the use of LAMA massager-hand-held unit to facilitate flow.    *Hypertension  Patient admits he has been noncompliant with his medications, does not take them regularly.    Blood pressure 10/26/2023 185/102.  He is fortunately asymptomatic.  Strongly encouraged him to resume blood pressure medications and close monitoring of blood pressure at home, reports he is agreeable with this.   10/31/2023: Blood pressure 176/102.  Has been compliant with his blood pressure medication over the last week.  Asymptomatic.  Checking his blood pressure at home and reports it typically runs 150s over 90s.  He plans to follow-up with his PCP in regards to blood pressure management.  Did advise if he becomes symptomatic he should present to the ER.  11/7/2023: BP today 167/91.  Recently started on clonidine patch.  Having some neck stiffness from the clonidine patch.  Follows again with his PCP Thursday for further management.  11/15/2023: Pressure remains elevated 160/88.  Denies any chest pain or shortness  of breath.  500 mils of normal saline given today for hydration while CMP pending.  11/21/2023: /98.  Asymptomatic.  Continues with clonidine patch.  11/28/2023: 157/91.,  Continues treatment evaluated 12/5/2023 2/6/2024: 174/103.  Asymptomatic.  Has not been compliant with his blood pressure medication, strongly encouraged him to take blood pressure medication regularly and monitor his BP at home.    *Hypokalemia  Potassium 2.9.  Patient again admits he has not been taking his oral potassium regularly.  Encouraged him to continue taking 20 mEq twice daily.  Also encouraged increasing potassium in his diet.  11/15/2023: Potassium is 3.4 today.  He has potassium chloride 20 mill equivalents he has been asked to take twice daily and encouraged to increase his potassium intake in his diet.  Does admit he has not been taking this consistently but does plan to do so.  We will continue to monitor.   11/21/2023: Potassium today still low at 3.3.  He again admits he is not taking his potassium replacement.  He will be provided with a handout on high potassium foods, encouraged him to increase his dietary potassium if he will not take oral potassium supplement.   11/28/2023: Potassium 3.7.  Repeat 12/5/2023 3.1, diet adjusted, recheck 12/7/2023 at 3.3  2/6/2024: Potassium 3.9.  9/3/2024 potassium 3.0, diet adjusted    *Weight loss  11/15/2023: Patient continues to struggle with his appetite and has been losing weight.    Enterade provided to patient to start in hopes of reducing GI symptoms related to chemotherapy.  11/21/2023: Weight improved 174 pounds.  Diarrhea has improved with Enterade.  Continue follow up with oncology dietician.   2/6/2024: Reports difficulty with eating adequately due to taste changes that started abruptly 3 weeks ago.  He has thrush present on exam which will be treated with nystatin.  Weight 157 9/3/2024, repeat dietary discussion    *Oropharyngeal candidiasis-present today on exam.   Patient has been complaining of decreased taste for the last 3 weeks.  Start nystatin 4 times daily x 7 days.  Improved though persistent 3/20/2024, trial of fluconazole x 7 days  Resolved 9/24    PLAN:   Continue follow-up with oncology dietitian.  Continue Imodium as needed for diarrhea  Continue current hypertensive regimen and follow-up with PCP.  Every 6 week port flush, scan order placed today-23 weeks  24 weeks follow-up MD, CBC, CMP  I spent 40 minutes caring for Mark on this date of service. This time includes time spent by me in the following activities: preparing for the visit, reviewing tests, obtaining and/or reviewing a separately obtained history, performing a medically appropriate examination and/or evaluation, counseling and educating the patient/family/caregiver, ordering medications, tests, or procedures, referring and communicating with other health care professionals, documenting information in the medical record, independently interpreting results and communicating that information with the patient/family/caregiver, and care coordination.     Mikel Ludwig MD  09/03/24

## 2024-09-03 ENCOUNTER — OFFICE VISIT (OUTPATIENT)
Dept: ONCOLOGY | Facility: CLINIC | Age: 74
End: 2024-09-03
Payer: MEDICARE

## 2024-09-03 VITALS
TEMPERATURE: 97.6 F | HEART RATE: 55 BPM | DIASTOLIC BLOOD PRESSURE: 93 MMHG | SYSTOLIC BLOOD PRESSURE: 186 MMHG | RESPIRATION RATE: 14 BRPM | WEIGHT: 157.5 LBS | OXYGEN SATURATION: 99 % | BODY MASS INDEX: 23.87 KG/M2 | HEIGHT: 68 IN

## 2024-09-03 DIAGNOSIS — C16.9 GASTRIC ADENOCARCINOMA: ICD-10-CM

## 2024-09-03 DIAGNOSIS — C16.0 MALIGNANT NEOPLASM OF CARDIA OF STOMACH: Primary | ICD-10-CM

## 2024-09-03 DIAGNOSIS — C16.9 MALIGNANT NEOPLASM OF STOMACH, UNSPECIFIED LOCATION: ICD-10-CM

## 2024-09-03 PROCEDURE — 1126F AMNT PAIN NOTED NONE PRSNT: CPT | Performed by: INTERNAL MEDICINE

## 2024-09-03 PROCEDURE — 99214 OFFICE O/P EST MOD 30 MIN: CPT | Performed by: INTERNAL MEDICINE

## 2024-09-23 ENCOUNTER — OFFICE VISIT (OUTPATIENT)
Dept: SLEEP MEDICINE | Facility: HOSPITAL | Age: 74
End: 2024-09-23
Payer: MEDICARE

## 2024-09-23 VITALS
BODY MASS INDEX: 23.7 KG/M2 | OXYGEN SATURATION: 95 % | WEIGHT: 151 LBS | SYSTOLIC BLOOD PRESSURE: 146 MMHG | HEIGHT: 67 IN | HEART RATE: 96 BPM | DIASTOLIC BLOOD PRESSURE: 92 MMHG

## 2024-09-23 DIAGNOSIS — C16.0 MALIGNANT NEOPLASM OF CARDIA OF STOMACH: ICD-10-CM

## 2024-09-23 DIAGNOSIS — R68.2 DRY MOUTH: ICD-10-CM

## 2024-09-23 DIAGNOSIS — G47.9 SLEEP DISTURBANCES: Primary | ICD-10-CM

## 2024-09-23 DIAGNOSIS — G47.13 RECURRENT HYPERSOMNIA: ICD-10-CM

## 2024-09-23 PROCEDURE — G0463 HOSPITAL OUTPT CLINIC VISIT: HCPCS

## 2024-10-01 ENCOUNTER — HOSPITAL ENCOUNTER (OUTPATIENT)
Dept: SLEEP MEDICINE | Facility: HOSPITAL | Age: 74
End: 2024-10-01
Payer: MEDICARE

## 2024-10-01 DIAGNOSIS — G47.13 RECURRENT HYPERSOMNIA: ICD-10-CM

## 2024-10-01 DIAGNOSIS — G47.9 SLEEP DISTURBANCES: ICD-10-CM

## 2024-10-01 DIAGNOSIS — R68.2 DRY MOUTH: ICD-10-CM

## 2024-10-01 PROCEDURE — 95806 SLEEP STUDY UNATT&RESP EFFT: CPT

## 2024-10-09 ENCOUNTER — TELEPHONE (OUTPATIENT)
Dept: SLEEP MEDICINE | Facility: HOSPITAL | Age: 74
End: 2024-10-09
Payer: MEDICARE

## 2024-10-09 NOTE — TELEPHONE ENCOUNTER
Spoke with patient about sleep study results and treatment options , pt will call back once he thinks about things , offered follow up as well

## 2024-10-15 ENCOUNTER — INFUSION (OUTPATIENT)
Dept: ONCOLOGY | Facility: HOSPITAL | Age: 74
End: 2024-10-15
Payer: MEDICARE

## 2024-10-15 DIAGNOSIS — Z45.2 FITTING AND ADJUSTMENT OF VASCULAR CATHETER: Primary | ICD-10-CM

## 2024-10-15 PROCEDURE — 25010000002 HEPARIN LOCK FLUSH PER 10 UNITS: Performed by: INTERNAL MEDICINE

## 2024-10-15 PROCEDURE — 96523 IRRIG DRUG DELIVERY DEVICE: CPT

## 2024-10-15 RX ORDER — HEPARIN SODIUM (PORCINE) LOCK FLUSH IV SOLN 100 UNIT/ML 100 UNIT/ML
500 SOLUTION INTRAVENOUS AS NEEDED
OUTPATIENT
Start: 2024-10-15

## 2024-10-15 RX ORDER — HEPARIN SODIUM (PORCINE) LOCK FLUSH IV SOLN 100 UNIT/ML 100 UNIT/ML
500 SOLUTION INTRAVENOUS AS NEEDED
Status: DISCONTINUED | OUTPATIENT
Start: 2024-10-15 | End: 2024-10-15 | Stop reason: HOSPADM

## 2024-10-15 RX ORDER — SODIUM CHLORIDE 0.9 % (FLUSH) 0.9 %
10 SYRINGE (ML) INJECTION AS NEEDED
Status: DISCONTINUED | OUTPATIENT
Start: 2024-10-15 | End: 2024-10-15 | Stop reason: HOSPADM

## 2024-10-15 RX ORDER — SODIUM CHLORIDE 0.9 % (FLUSH) 0.9 %
10 SYRINGE (ML) INJECTION AS NEEDED
OUTPATIENT
Start: 2024-10-15

## 2024-10-15 RX ADMIN — Medication 500 UNITS: at 11:09

## 2024-10-15 RX ADMIN — Medication 10 ML: at 11:09

## 2024-11-26 ENCOUNTER — INFUSION (OUTPATIENT)
Dept: ONCOLOGY | Facility: HOSPITAL | Age: 74
End: 2024-11-26
Payer: MEDICARE

## 2024-11-26 DIAGNOSIS — Z45.2 FITTING AND ADJUSTMENT OF VASCULAR CATHETER: Primary | ICD-10-CM

## 2024-11-26 DIAGNOSIS — C16.0 MALIGNANT NEOPLASM OF CARDIA OF STOMACH: ICD-10-CM

## 2024-11-26 LAB — VIT B12 BLD-MCNC: 257 PG/ML (ref 211–946)

## 2024-11-26 PROCEDURE — 82607 VITAMIN B-12: CPT | Performed by: NURSE PRACTITIONER

## 2024-11-26 PROCEDURE — 36591 DRAW BLOOD OFF VENOUS DEVICE: CPT

## 2024-11-26 PROCEDURE — 25010000002 HEPARIN LOCK FLUSH PER 10 UNITS: Performed by: INTERNAL MEDICINE

## 2024-11-26 RX ORDER — SODIUM CHLORIDE 0.9 % (FLUSH) 0.9 %
10 SYRINGE (ML) INJECTION AS NEEDED
OUTPATIENT
Start: 2024-11-26

## 2024-11-26 RX ORDER — HEPARIN SODIUM (PORCINE) LOCK FLUSH IV SOLN 100 UNIT/ML 100 UNIT/ML
500 SOLUTION INTRAVENOUS AS NEEDED
Status: DISCONTINUED | OUTPATIENT
Start: 2024-11-26 | End: 2024-11-26 | Stop reason: HOSPADM

## 2024-11-26 RX ORDER — HEPARIN SODIUM (PORCINE) LOCK FLUSH IV SOLN 100 UNIT/ML 100 UNIT/ML
500 SOLUTION INTRAVENOUS AS NEEDED
OUTPATIENT
Start: 2024-11-26

## 2024-11-26 RX ORDER — CYANOCOBALAMIN 1000 UG/ML
1000 INJECTION, SOLUTION INTRAMUSCULAR; SUBCUTANEOUS ONCE
Status: COMPLETED | OUTPATIENT
Start: 2024-12-03 | End: 2024-12-03

## 2024-11-26 RX ORDER — SODIUM CHLORIDE 0.9 % (FLUSH) 0.9 %
10 SYRINGE (ML) INJECTION AS NEEDED
Status: DISCONTINUED | OUTPATIENT
Start: 2024-11-26 | End: 2024-11-26 | Stop reason: HOSPADM

## 2024-11-26 RX ADMIN — Medication 10 ML: at 10:54

## 2024-11-26 RX ADMIN — Medication 500 UNITS: at 10:53

## 2024-11-26 NOTE — NURSING NOTE
Pt requested to check Vitamin B12 levels today with port flush. Received verbal order from BHARAT Jones.      Per Ngozi B12 level is normal, but very low end of normal. If pt would like to start Vitamin B12 injections it would be reasonable. Called to notify pt, he would like to start B12 injections, but does not know if he wants to receive them in the office or self administer at home until he receives his first injection. Pt scheduled to start B12 injections on 12/3/2024. Per Ngozi pt may start B12 injections daily x 1 week, weekly x 1 month, then once monthly after that. If pt is unable to do daily x 1 week pt can opt to start weekly x 1 month then once monthly. Pt will let us know his preference and can schedule rest of injections when he is seen on 12/3/24. Scheduling notified and [facility] RN notified to add tx plan.

## 2024-12-03 ENCOUNTER — INFUSION (OUTPATIENT)
Dept: ONCOLOGY | Facility: HOSPITAL | Age: 74
End: 2024-12-03
Payer: MEDICARE

## 2024-12-03 DIAGNOSIS — C16.9 GASTRIC ADENOCARCINOMA: Primary | ICD-10-CM

## 2024-12-03 PROCEDURE — 25010000002 CYANOCOBALAMIN PER 1000 MCG: Performed by: INTERNAL MEDICINE

## 2024-12-03 PROCEDURE — 96372 THER/PROPH/DIAG INJ SC/IM: CPT

## 2024-12-03 RX ORDER — SYRINGE W-NEEDLE,DISPOSAB,3 ML 25GX5/8"
SYRINGE, EMPTY DISPOSABLE MISCELLANEOUS
Qty: 10 EACH | Refills: 0 | Status: SHIPPED | OUTPATIENT
Start: 2024-12-03

## 2024-12-03 RX ORDER — CYANOCOBALAMIN 1000 UG/ML
INJECTION, SOLUTION INTRAMUSCULAR; SUBCUTANEOUS
Qty: 10 ML | Refills: 0 | Status: SHIPPED | OUTPATIENT
Start: 2024-12-03

## 2024-12-03 RX ADMIN — CYANOCOBALAMIN 1000 MCG: 1000 INJECTION, SOLUTION INTRAMUSCULAR at 11:23

## 2024-12-03 NOTE — TELEPHONE ENCOUNTER
Pt has decided to give himself B12 injections. Pt educated by injection nurse and B12 injections sent to this pharmacy.

## 2024-12-03 NOTE — NURSING NOTE
"Arrived for initial injection of B12. States that he has watched different Youtube instructions, This nurse explained and demonstrated with proper identification of rx., exp. date, use of alcohol swabs to wipe off port of vial. Demonstrated how to properly draw up the solution with technique to include clearing the needle of medication by drawing back a small amt; apply a 1\" needle that will be supplied per his pharmacy and then to gently fill that needle with the medication until he sees a bubble at end of needle. This nurse demonstrated the correct  preparation of  site, inject and administer the medication.     Using saline post administration he demonstrated back the correct technique all except administration. D/W Delmy Merlos and informed her that pt. Is agreeable to administer at home. Delmy states that she will send orders to his pharmacy; he is to inject daily x 7 days;  then weekly for 1 month and then monthly. He verbalizes understanding and questions answered to his satisfaction. He also states that he has the number to call in if he has questions. Discharged stable.  "

## 2024-12-30 RX ORDER — DULOXETIN HYDROCHLORIDE 20 MG/1
20 CAPSULE, DELAYED RELEASE ORAL DAILY
Qty: 60 CAPSULE | Refills: 2 | Status: SHIPPED | OUTPATIENT
Start: 2024-12-30

## 2024-12-30 NOTE — TELEPHONE ENCOUNTER
"    Caller: Mark Ken \"Ray\"    Relationship: Self    Best call back number: [phone]    Requested Prescriptions:   Requested Prescriptions     Pending Prescriptions Disp Refills    DULoxetine (CYMBALTA) 20 MG capsule 60 capsule 1     Sig: Take 1 capsule by mouth Daily.        Pharmacy where request should be sent: [facility] [address] [place] [address] - [phone]  - [phone]     Last office visit with prescribing clinician: 9/3/2024   Last telemedicine visit with prescribing clinician: Visit date not found   Next office visit with prescribing clinician: 2/25/2025     Additional details provided by patient:     Does the patient have less than a 3 day supply:  [] Yes  [x] No    Would you like a call back once the refill request has been completed: [] Yes [x] No    If the office needs to give you a call back, can they leave a voicemail: [x] Yes [] No    Sumanth Mata Rep   12/30/24 14:29 EST       "

## 2025-02-13 ENCOUNTER — HOSPITAL ENCOUNTER (OUTPATIENT)
Dept: CT IMAGING | Facility: HOSPITAL | Age: 75
Discharge: HOME | End: 2025-02-13
Admitting: INTERNAL MEDICINE
Payer: MEDICARE

## 2025-02-13 ENCOUNTER — INFUSION (OUTPATIENT)
Dept: ONCOLOGY | Facility: HOSPITAL | Age: 75
End: 2025-02-13
Payer: MEDICARE

## 2025-02-13 DIAGNOSIS — C16.0 MALIGNANT NEOPLASM OF CARDIA OF STOMACH: ICD-10-CM

## 2025-02-13 DIAGNOSIS — C16.9 GASTRIC ADENOCARCINOMA: ICD-10-CM

## 2025-02-13 DIAGNOSIS — C16.9 MALIGNANT NEOPLASM OF STOMACH, UNSPECIFIED LOCATION: ICD-10-CM

## 2025-02-13 LAB
ALBUMIN SERPL-MCNC: 4 G/DL (ref 3.5–5.2)
ALBUMIN/GLOB SERPL: 1.6 G/DL
ALP SERPL-CCNC: 103 U/L (ref 39–117)
ALT SERPL W P-5'-P-CCNC: 39 U/L (ref 1–41)
ANION GAP SERPL CALCULATED.3IONS-SCNC: 9.7 MMOL/L (ref 5–15)
AST SERPL-CCNC: 32 U/L (ref 1–40)
BASOPHILS # BLD AUTO: 0.05 10*3/MM3 (ref 0–0.2)
BASOPHILS NFR BLD AUTO: 0.8 % (ref 0–1.5)
BILIRUB SERPL-MCNC: 0.6 MG/DL (ref 0–1.2)
BUN SERPL-MCNC: 10 MG/DL (ref 8–23)
BUN/CREAT SERPL: 12.3 (ref 7–25)
CALCIUM SPEC-SCNC: 9 MG/DL (ref 8.6–10.5)
CHLORIDE SERPL-SCNC: 105 MMOL/L (ref 98–107)
CO2 SERPL-SCNC: 27.3 MMOL/L (ref 22–29)
CREAT SERPL-MCNC: 0.81 MG/DL (ref 0.76–1.27)
DEPRECATED RDW RBC AUTO: 43.7 FL (ref 37–54)
EGFRCR SERPLBLD CKD-EPI 2021: 93.1 ML/MIN/1.73
EOSINOPHIL # BLD AUTO: 0.21 10*3/MM3 (ref 0–0.4)
EOSINOPHIL NFR BLD AUTO: 3.3 % (ref 0.3–6.2)
ERYTHROCYTE [DISTWIDTH] IN BLOOD BY AUTOMATED COUNT: 13.6 % (ref 12.3–15.4)
GLOBULIN UR ELPH-MCNC: 2.5 GM/DL
GLUCOSE SERPL-MCNC: 87 MG/DL (ref 65–99)
HCT VFR BLD AUTO: 41.1 % (ref 37.5–51)
HGB BLD-MCNC: 14.1 G/DL (ref 13–17.7)
IMM GRANULOCYTES # BLD AUTO: 0.01 10*3/MM3 (ref 0–0.05)
IMM GRANULOCYTES NFR BLD AUTO: 0.2 % (ref 0–0.5)
LYMPHOCYTES # BLD AUTO: 2.11 10*3/MM3 (ref 0.7–3.1)
LYMPHOCYTES NFR BLD AUTO: 33 % (ref 19.6–45.3)
MCH RBC QN AUTO: 30.1 PG (ref 26.6–33)
MCHC RBC AUTO-ENTMCNC: 34.3 G/DL (ref 31.5–35.7)
MCV RBC AUTO: 87.8 FL (ref 79–97)
MONOCYTES # BLD AUTO: 0.53 10*3/MM3 (ref 0.1–0.9)
MONOCYTES NFR BLD AUTO: 8.3 % (ref 5–12)
NEUTROPHILS NFR BLD AUTO: 3.48 10*3/MM3 (ref 1.7–7)
NEUTROPHILS NFR BLD AUTO: 54.4 % (ref 42.7–76)
NRBC BLD AUTO-RTO: 0 /100 WBC (ref 0–0.2)
PLATELET # BLD AUTO: 165 10*3/MM3 (ref 140–450)
PMV BLD AUTO: 10.6 FL (ref 6–12)
POTASSIUM SERPL-SCNC: 3.3 MMOL/L (ref 3.5–5.2)
PROT SERPL-MCNC: 6.5 G/DL (ref 6–8.5)
RBC # BLD AUTO: 4.68 10*6/MM3 (ref 4.14–5.8)
SODIUM SERPL-SCNC: 142 MMOL/L (ref 136–145)
WBC NRBC COR # BLD AUTO: 6.39 10*3/MM3 (ref 3.4–10.8)

## 2025-02-13 PROCEDURE — 74177 CT ABD & PELVIS W/CONTRAST: CPT

## 2025-02-13 PROCEDURE — 71260 CT THORAX DX C+: CPT

## 2025-02-13 PROCEDURE — 80053 COMPREHEN METABOLIC PANEL: CPT | Performed by: INTERNAL MEDICINE

## 2025-02-13 PROCEDURE — 25510000001 IOPAMIDOL 61 % SOLUTION: Performed by: INTERNAL MEDICINE

## 2025-02-13 PROCEDURE — 85025 COMPLETE CBC W/AUTO DIFF WBC: CPT | Performed by: INTERNAL MEDICINE

## 2025-02-13 PROCEDURE — 25510000002 DIATRIZOATE MEGLUMINE & SODIUM PER 1 ML: Performed by: INTERNAL MEDICINE

## 2025-02-13 RX ORDER — DIATRIZOATE MEGLUMINE AND DIATRIZOATE SODIUM 660; 100 MG/ML; MG/ML
30 SOLUTION ORAL; RECTAL
Status: COMPLETED | OUTPATIENT
Start: 2025-02-13 | End: 2025-02-13

## 2025-02-13 RX ORDER — IOPAMIDOL 612 MG/ML
100 INJECTION, SOLUTION INTRAVASCULAR
Status: COMPLETED | OUTPATIENT
Start: 2025-02-13 | End: 2025-02-13

## 2025-02-13 RX ADMIN — DIATRIZOATE MEGLUMINE AND DIATRIZOATE SODIUM 30 ML: 660; 100 LIQUID ORAL; RECTAL at 08:35

## 2025-02-13 RX ADMIN — IOPAMIDOL 85 ML: 612 INJECTION, SOLUTION INTRAVENOUS at 09:41

## 2025-02-19 ENCOUNTER — TELEPHONE (OUTPATIENT)
Dept: ONCOLOGY | Facility: CLINIC | Age: 75
End: 2025-02-19
Payer: MEDICARE

## 2025-02-19 NOTE — TELEPHONE ENCOUNTER
"  Caller: Mark Ken \"Ray\"    Relationship: Self    Best call back number: [phone]      Who are you requesting to speak with (clinical staff, provider,  specific staff member): SCHEDULING      What was the call regarding: PT ASKING THE VITALS APPT BE REMOVED AS HE DOESN'T FEEL IT IS NECESSARY.    "

## 2025-02-24 NOTE — PROGRESS NOTES
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  Overall, he is tolerated treatment quite well.  He states he did have 1 day that he forgot about the cold sensitivity, and ate a popsicle.  This resulted in some numbness/tingling of his mouth, which resolved once his mouth warmed back up.  He does have some chronic numbness in his left hand in his middle and ring finger, related to carpal tunnel.  He also has some mild chronic diarrhea which has not worsened.  He denies skin rash, and denies issues with nausea or vomiting.    We went on to proceed with cycle 3 FOLFOX and plans for the patient to be seen 2 weeks later.  In the meantime he has been seen by Dr. Varela anticipating surgical resection to require total gastrectomy, partial Jasiel Arnulfo esophagectomy and Steffany-en-Y reconstruction.  Currently the patient is scheduled for a PET 8/14/2023 and review by Dr. Varela 8/17/2023.    The patient is next seen 8/9/2023.  He is ready to proceed with his fourth cycle of therapy and subsequent PET/CT.  His case has been discussed with  as well as to his plans described above.    The patient proceeded to PET/CT 8/14/2023 to document resolution of focal hypermetabolism along the gastric lesser curvature, subcentimeter upper abdominal retroperitoneal lymphadenopathy was too small to characterize as were unchanged 5 to 6 mm right lower lobe solid pulmonary nodules.    The patient was admitted 9/13-9/18 undergoing Noble total gastrectomy with Steffany-en-Y, esophagojejunostomy and jejunal feeding tube placement.  Pathology revealed the stomach with a total gastrectomy and omentectomy-invasive moderately to poorly differentiated adenocarcinoma with treatment effect measuring 1 cm arising the background of  intestinal metaplasia, associated low and high-grade dysplasia, margins free of dysplasia or malignancy, intestinal metaplasia present at proximal and distal margins excision, 2 of 16 lymph nodes positive for metastatic adenocarcinoma the largest focus measuring 4 mm, benign lobulated adipose tissue consistent with omentum negative for tumor.  Final stage: [id number].    The patient was seen back by Dr. Mckenzie 9/28/2023 with his incision healing and staples removed, J-tube had not been used and was removed patient was doing well enough to be referred back to proceed with his next portion of therapy.    He is seen formally 10/16/2023.  Fortunately he is doing exceedingly well and, while he is working to manage his new diet, is confirmed with Dr. Mckenzie that we can proceed with her second phase of chemotherapy with 4 additional cycles of FOLFOX.    Patient returns today for evaluation prior to initiating his second phase of chemotherapy with cycle 5 FOLFOX.  Continues with alternating constipation and diarrhea.  Previously had 4 days of no bowel movement, but for the last week has now been having diarrhea, up to 8 episodes daily.  He has not taken any medications for his diarrhea.  Reports he is not eating much due to having diarrhea following eating.  Also reports he is not drinking a lot of water, maybe 20 ounces daily.  Continues with neuropathy in the bilateral feet, this remains stable.  Also admits he has been noncompliant with his blood pressure medications, reports he does not take these regularly.  He is again hypertensive in office today, fortunately he is asymptomatic.  Strongly encouraged him to resume his blood pressure medications.  Denies fever or chills.  Denies nausea or vomiting.  Denies new or worsening pain.    Patient is evaluated 10/31/2023 for toxicity check and possible IV fluids.  Diarrhea has greatly improved with the use of Imodium.  Currently taking 1 Imodium tablet every 3-4 days with good  control of his diarrhea.  Appetite has been good.  He does admit he needs to try and eat smaller portions, as he is experiencing bloating by overeating.  His blood pressure is again elevated today.  He does report being compliant with his blood pressure medicine since his last visit.  He has also been checking his blood pressure daily at home, reports it runs 150s over 90s at home.  Reports that his blood pressure typically runs higher at the doctor's office.  Denies any headache, vision changes, or chest pain.  He does plan to follow-up with his PCP concerning persistently elevated blood pressures despite being compliant with his blood pressure medicine over the last week.  Did advise him if he starts to have headache, chest pain, or vision changes or his blood pressure consistently runs higher at home that he may need to present to the ER for prompt evaluation of his elevated blood pressure.  Continues with neuropathy in the bilateral feet, stable.  Denies fever or chills.  Denies nausea or vomiting.  Denies new or worsening pain.    Seen 11/7/2023 prior to next FOLFOX.  He is seen back in the infusion area.  He was started on a clonidine patch last week by his cardiologist.  Reports his blood pressures have been somewhat improved, though he is starting to have neck stiffness, which is a side effect of the clonidine patch.  He follows with cardiology Thursday and plans to discuss with them.  Continues to eat and drink well.  Bowels remain well controlled with Imodium every 3 days.  Neuropathy remains stable.  He has been off of his Cymbalta, does feel that his depression is worsening.  He did resume his Cymbalta.  He reports good family support with his son.  Also part of several Facebook groups.  Did discuss our supportive oncology team, however he does not feel he needs this at this time.  Denies fever or chills.  Denies nausea or vomiting.  Denies new or worsening pain.    Seen 11/15/2023 for follow-up and  laboratory review.  Patient has been significantly more fatigued since surgery as he continues on treatment and has had a decreased appetite.  He is not eating and drinking as well as he previously was.  His family has been concerned he may be more dehydrated and may need some fluids.  He continues to have diarrhea that has been well managed with Imodium approximately every 3 days.  He denies any nausea but did have an episode of dry heaving after trying to drink a liquid IV.  He denies any fevers or chills or other infectious symptoms.  No other concerns noted at this time.    Next evaluated 11/21/2023 for cycle 7 FOLFOX.  He is feeling much improved after receiving IV fluids last week.  He continues Imodium every 3-4 days to control his diarrhea.  He has also been drinking an trade daily, and feels this has significantly improved his diarrhea.  Reports improvement in his appetite, weight is up by a few pounds.  Peripheral neuropathy remains stable.  Denies fever or chills.  Denies nausea or vomiting.  Denies new or worsening pain.  No new concerns today.    Mark is seen today, 11/28/2023 for possible IV fluids.  He has now completed 3 additional cycles of FOLFOX.  He continues on Imodium every 3 days.  He has also been taking an trade and feels this has significantly helped with his bowels.  His neuropathy remains stable.  He is emotional today as he discusses difficulty during the holidays related to his new eating restrictions.  He did enjoy a lot of time with his family over the last week, but feels it may be helpful to have someone to talk to in regards to his frustrations.    The patient is next reviewed 12/5/2023 for his fourth cycle of FOLFOX as he completes his adjuvant therapy.  He was referred to oncology social work for additional counseling.  We discussed completing his treatment and have her undergo repeat scans posttherapy as baseline.  The patient understands this plan and hopes to proceed as  planned.    Unfortunately the patient had a reaction during his administration of his last chemotherapy regimen leading to discontinuance of the treatment.  This led to gradual improvement of his symptoms with nausea completely resolving.  At this point we went on to have him complete the 5-FU portion of his treatment and he underwent repeat scans In and accelerated fashion with CT of chest, and, pelvis 12/8/2023 that is reviewed with him 12/11/2023 demonstrating small residual fluid within the abdomen and pelvis, small nodes into the distal esophagus slightly larger and thought to be reactive.  No additional abnormalities are present.  We have discussed maintaining his port with every 4 week flushes and have not seen back in 8 weeks and possibly rescan in approximately 3 months.    Next seen 2/6/2024 for port flush and follow up.  Over the last 3 weeks he has experienced loss of taste.  He denies any COVID exposures or other respiratory symptoms.  Because of this, he has not been eating or drinking well despite feeling hungry.  His neuropathic symptoms remain stable.  His blood pressure is again elevated today, he admits to not taking his blood pressure medication this morning but agrees to take it as soon as he returns home.    The patient is assessed 3/20/2024.  Follow-up scans 3/13/2024 reviewed with him demonstrating no significant abnormality in the chest, distal paraesophageal lymph node interval decrease 5 x 12 mm previously 10 x 17 mm, abdominal and pelvic CT with no new abnormality, small mesenteric and peritoneal was again seen with index 12 mm x 6 mm aortocaval lymph node that a previously measured by 14 x 17 mm.  His performance status has gradually improved though his appetite remains somewhat reduced because of reduced taste.    The patient had follow-up studies done 8/27/2024 demonstrating no new findings in the chest except for an unchanged sub-6 mm solid pulmonary nodule in the right lower lobe  that is new, evidence of postgastrectomy esophagojejunostomy, no abnormalities in the liver, patient status post TURP, evidence of sigmoid diverticulosis,, no bony lesions.    He is seen 9/3/2024 slowly having gained weight by scheduling of his meals recognizing cannot eat like he did previously and with a reasonably good performance status.  We discussed the above scans indicating on a follow-up exam be necessary.  He also describes the fact that he will often use a massager on his chest to help his port flow more easily.  While this is not a commonly used practice we will research whether there is any utility the with the concern that a fibrin sheath or possible catheter fracture would be a risk.    Patient is next seen 2/25/2025 with CT of chest, abdomen, pelvis 2/13/2025 showing  a few sub-6 mm pulmonary nodules, right external iliac node both of which indeterminate at this point.  He indicates that he has been able to maintain his weight but is concerned that he is not often hungry and has extremely early satiety.  We have discussed an assessment by GI likely with repeat endoscopy.    Past Medical History:   Diagnosis Date    Arthritis     BPH (benign prostatic hyperplasia)     Depression     Diabetes mellitus     Diarrhea     GERD (gastroesophageal reflux disease)     Gout     Hyperlipidemia     Hypertension     Neuropathy     bilat feet    Pulmonary arterial hypertension     Restless legs     Skin cancer, basal cell     Stomach cancer 2023    CHEMOTHERAPY      Hematologic/oncologic history:      The patient is a 73-year-old male followed by primary care with a history of hypertension, hyperlipidemia, type 2 diabetes, chronic venous stasis, osteoarthritis and gout.  He has been admitted 12/5- 7/2022 with joint pain that was polyarticular with associated edema.  This became quite marked and increasing 20 pounds over 7 days.  His initial studies included hemoglobin 11.4 hematocrit 34.7, sed rate of 39, negative  anti-double-stranded DNA, Br[place]ila antigen, RNP, SSA and SSB, normal liver function tests, normal echocardiogram, normal ultrasound of kidneys.  The patient was placed on a 2 g sodium diet, starting of IV diuretics with adjustment of his amlodipine and ropinirole and he did lose approximately 15 pounds.  Studies in around this time included 8/29/2022 with limited abdominal ultrasound showed a small amount of echogenic sludge in the gallbladder, echogenicity liver indicative hepatic steatosis without mass and HIDA scan which was essentially normal.  Renal ultrasound suggested a potential focal cortical defect in the left kidney and follow-up CT abdomen pelvis 1/10/2023 demonstrated no renal calculi, hydronephrosis or suspicious renal mass, diffuse hepatic steatosis and colonic diverticulosis.    The patient later in March required right carpal tunnel release and had been seen by GI (Dr. Reilly) 3/9/2023 undergoing colonoscopy for surveillance with a history of colonic polyps (adenomatous).  After the procedure he did mention upper GI symptoms with nausea left upper abdominal pain and reflux and had previously had upper abdominal symptoms which resolved thought to be gastritis.  His recent radiologic studies did not explain his symptoms and plans were made for EGD through Dr. Reilly.    EGD was scheduled on 4/27/2023 demonstrating normal mucosa in the upper third of the esophagus the middle third and the lower third, Z-line regular at 40 cm, scattered mild mucosal changes characterized by granularity at the GE junction with biopsy, diffuse mucosal changes with atrophy in the gastric body with biopsies taken and localized severe mucosal change with ulceration found on the posterior wall of the gastric body.  The cardia and gastric fundus were normal retroflexion and no mucosal was found in the entire duodenum with additional biopsies taken.    Pathology results included random duodenal biopsies negative, random  gastric biopsy with intestinal metaplasia, negative H. pylori and random lower esophageal biopsy with reactive changes only but gastric ulcer biopsy was consistent with moderately to poorly differentiated adenocarcinoma arising in a background of intestinal metaplasia with high-grade dysplasia, gastric ulcer with necroinflammatory exudate present and subsequent immunostain for HER2 was negative.    The patient's subsequent imaging included CT chest abdomen pelvis showing circumferential thickening of the lesser curvature of the stomach with a central area of ulceration, prominent gastric hepatic ligament and lymph nodes concerning for metastatic disease but no evidence of distant metastatic disease.    The patient is now referred to oncology.  It is not clear that he has been seen by surgery as of yet.    The patient was to be seen in [facility] office today but was unable to make the appointment as a result of accidents on the express[place] path for many miles and leading to him having to return to home.  He is contacted by telephone (agrees with the call) recognizing that he truly needs an assessment by PET/CT and possibly a surgical assessment now.  Fortunately his symptoms are relatively well controlled at present.      The patient was more appropriately directed to general surgery initially and seen 5/26/2023 with consideration that he undergo peritoneal washing to evaluate for malignant cells.  This occurred through Dr. Mckenzie 5/30/2023-EGD and laparoscopy.  Study revealed through a retroflexed view the fundus and ulcerated tumor located along the lesser curvature immediately distal to the GE junction, 4 cm distal to the GE junction.  Washings were negative for definitive for malignancy.    It was concluded that he would need an esophagogastrectomy and the case was discussed with  who favored upfront chemotherapy given neoadjuvantly.  The patient was seen 6/5/2023 and it was determined that  endoscopic ultrasound be obtained and if this revealed T1 tumor and he be a good candidate for initial surgery.  The patient is to be seen by Dr. Hodge concerning this.    The patient is seen on 6/13/2023 in office.  We have discussed, in detail with his son present, his current status including the need for EUS, subsequent port placement as we determine his tumor stage.  Available, currently, his PET/CT performed 5/3/2023 showing low-level activity in the short segment of the lesser curvature at the site of his gastric malignancy, otherwise negative with very low-level metabolic activity in the abdomen and shotty nodes in the axilla and both groin regions.    The patient continued his staging undergoing EGD and EUS 6/21/2023 demonstrated gastric ulceration/mass in the mid to proximal portion lesser curvature of the stomach measuring 5-7 cm, EUS with gastric mass and invasion of muscularis propria with 1 area penetrating through the muscularis propria into the adventitia-likely T2 lesion, 3 small lymph nodes celiac axis/gastrohepatic region not overtly hyperechoic largest measuring 6 mm, normal pancreatic EUS and fatty liver with no metastatic lesions.  FNB x2 performed the largest lymph node negative cytologically.  He had seen  6/21/2023 with plans to approach a T2 lesion or higher with chemotherapy neoadjuvant adjuvant settings-likely to require total gastrectomy, partial [place] Arnulfo esophagectomy and Steffany-en-Y reconstruction.  The patient underwent Mediport placement 6/23/2023 and is seen back in office 6/27/2023 to proceed with chemotherapy-FOLFOX.    Patient returned to the the office  on 7/5/2023 for toxicity check and possible IV fluids following completion of his first cycle of FOLFOX on 6/27/2023.  Patient reports he is doing well and he denies any nausea, vomiting, or worsening neuropathy.  Patient did have some mild constipation initially following treatment that resolved on its own.  He continues  to eat and drink well.  He has been able to still play golf since completing his first treatment.  He does not feel that he needs any IV fluids today.  No other concerns noted at this time.    He is next seen 7/11/2023 for his second cycle of FOLFOX.  Unfortunately his access needle was not removed after his last visit and thus his port has been accessed for a week.  He is not having pain or significant tenderness in the area nor fever nor chills.  His port is now been deaccessed, clean, reaccessed and we have discussed how to proceed.    We went on to proceed with cycle 3 FOLFOX and plans for the patient to be seen 2 weeks later.  In the meantime he has been seen by Dr. Varela anticipating surgical resection to require total gastrectomy, partial [place] Arnulfo esophagectomy and Steffany-en-Y reconstruction.  Currently the patient is scheduled for a PET 8/14/2023 and review by Dr. Varela 8/17/2023.    The patient is next seen 8/9/2023.  He is ready to proceed with his fourth cycle of therapy and subsequent PET/CT.  His case has been discussed with  as well as to his plans described above.    The patient proceeded to PET/CT 8/14/2023 to document resolution of focal hypermetabolism along the gastric lesser curvature, subcentimeter upper abdominal retroperitoneal lymphadenopathy was too small to characterize as were unchanged 5 to 6 mm right lower lobe solid pulmonary nodules.    The patient was admitted 9/13-9/18 undergoing Noble total gastrectomy with Steffany-en-Y, esophagojejunostomy and jejunal feeding tube placement.  Pathology revealed the stomach with a total gastrectomy and omentectomy-invasive moderately to poorly differentiated adenocarcinoma with treatment effect measuring 1 cm arising the background of intestinal metaplasia, associated low and high-grade dysplasia, margins free of dysplasia or malignancy, intestinal metaplasia present at proximal and distal margins excision, 2 of 16 lymph nodes positive for metastatic  adenocarcinoma the largest focus measuring 4 mm, benign lobulated adipose tissue consistent with omentum negative for tumor.  Final stage: [id number].    The patient was seen back by Dr. Mckenzie 9/28/2023 with his incision healing and staples removed, J-tube had not been used and was removed patient was doing well enough to be referred back to proceed with his next portion of therapy.    He is seen formally 10/16/2023.  Fortunately he is doing exceedingly well and, while he is working to manage his new diet, is confirmed with Dr. Mckenzie that we can proceed with her second phase of chemotherapy with 4 additional cycles of FOLFOX.    Patient completed 4 additional cycles of FOLFOX-12/5/2023, follow-up scans to be obtained at baseline.    Unfortunately the patient had a reaction during his administration of his last chemotherapy regimen leading to discontinuance of the treatment.  This led to gradual improvement of his symptoms with nausea completely resolving.  At this point we went on to have him complete the 5-FU portion of his treatment and he underwent repeat scans In and accelerated fashion with CT of chest, and, pelvis 12/8/2023 that is reviewed with him 12/11/2023 demonstrating small residual fluid within the abdomen and pelvis, small nodes into the distal esophagus slightly larger and thought to be reactive.  No additional abnormalities are present.  We have discussed maintaining his port with every 4 week flushes and have not seen back in 8 weeks and possibly rescan in approximately 3 months.    The patient is evaluated March 2024 with stable findings, follow-up scans scheduled in 6 months, port flush every 6 weeks.    He is seen 9/3/2024 slowly having gained weight by scheduling of his meals recognizing cannot eat like he did previously and with a reasonably good performance status.  We discussed the above scans indicating on a follow-up exam be necessary.  He also describes the fact that he will often use a  massager on his chest to help his port flow more easily.  While this is not a commonly used practice we will research whether there is any utility the with the concern that a fibrin sheath or possible catheter fracture would be a risk.    Patient is next seen 2/25/2025 with CT of chest, abdomen, pelvis 2/13/2025 showing  a few sub-6 mm pulmonary nodules, right external iliac node both of which indeterminate at this point.  He indicates that he has been able to maintain his weight but is concerned that he is not often hungry and has extremely early satiety.  We have discussed an assessment by GI likely with repeat endoscopy.      Past Surgical History:   Procedure Laterality Date    CARPAL TUNNEL RELEASE Left 01/19/2023    Procedure: LEFT CARPAL TUNNEL RELEASE AND SYNOVIAL BIOPSY;  Surgeon: Mikel Dupont MD;  Location: [facility];  Service: Hand;  Laterality: Left;    CARPAL TUNNEL RELEASE Right 03/02/2023    Procedure: RIGHT CARPAL TUNNEL RELEASE;  Surgeon: Mikel Dupont MD;  Location: [facility];  Service: Hand;  Laterality: Right;    CATARACT EXTRACTION WITH INTRAOCULAR LENS IMPLANT Bilateral     COLONOSCOPY N/A 03/09/2023    DIAGNOSTIC LAPAROSCOPY N/A 05/30/2023    Procedure: DIAGNOSTIC LAPAROSCOPY with peritoneal washing;  Surgeon: Tito Mckenzie MD;  Location: [facility] MAIN OR;  Service: General;  Laterality: N/A;    ENDOSCOPY N/A 04/27/2023    Dr. Reilly    ENDOSCOPY N/A 05/30/2023    Procedure: ESOPHAGOGASTRODUODENOSCOPY;  Surgeon: Tito Mckenzie MD;  Location: [facility] MAIN OR;  Service: General;  Laterality: N/A;    ESOPHAGOGASTRECTOMY N/A 9/13/2023    Procedure: Esophagogastroduodenoscopy;  Surgeon: Berry Varela MD;  Location: [facility] MAIN OR;  Service: Thoracic;  Laterality: N/A;    GASTRECTOMY N/A 9/13/2023    Procedure: GASTRECTOMY WITH FEEDING JEJUNOSTOMY PLACEMENT;  Surgeon: Tito Mckenzie MD;  Location: [facility] MAIN OR;  Service: General;  Laterality: N/A;    KNEE  "ARTHROSCOPY Bilateral     TOTAL KNEE ARTHROPLASTY Right 02/21/2018    UPPER ENDOSCOPIC ULTRASOUND W/ FNA N/A 06/21/2023    Procedure: ENDOSCOPIC ULTRASOUND WITH STAGING AND FINE NEEDLE ASPIRATION;  Surgeon: Kavon Hodge MD;  Location: [facility] ENDOSCOPY;  Service: Gastroenterology;  Laterality: N/A;  Post:    VENOUS ACCESS DEVICE (PORT) INSERTION N/A 06/23/2023    Procedure: INSERTION VENOUS ACCESS DEVICE;  Surgeon: Berry Varela MD;  Location: [facility] MAIN OR;  Service: Thoracic;  Laterality: N/A;        Current Outpatient Medications on File Prior to Visit   Medication Sig Dispense Refill    DULoxetine (CYMBALTA) 20 MG capsule Take 1 capsule by mouth Daily. 60 capsule 2    lisinopril (PRINIVIL,ZESTRIL) 40 MG tablet       amLODIPine (NORVASC) 5 MG tablet  (Patient not taking: Reported on 2/25/2025)      cloNIDine (CATAPRES-TTS) 0.2 MG/24HR patch Place 1 patch on the skin as directed by provider 1 (One) Time Per Week. (Patient not taking: Reported on 2/25/2025)      cyanocobalamin 1000 MCG/ML injection Inject 1000mcg IM daily x 1 week, weekly x 1 month, then once monthly (Patient not taking: Reported on 2/25/2025) 10 mL 0    fluconazole (Diflucan) 100 MG tablet Take 1 tablet by mouth Daily. X 7 days (Patient not taking: Reported on 2/25/2025) 7 tablet 2    metoprolol succinate XL (TOPROL-XL) 50 MG 24 hr tablet Take 1 tablet by mouth Daily. (Patient not taking: Reported on 2/25/2025) 30 tablet 3    nystatin (MYCOSTATIN) 100,000 unit/mL suspension Swish and swallow 5 mL 4 (Four) Times a Day. (Patient not taking: Reported on 2/25/2025) 473 mL 1    potassium chloride (K-DUR,KLOR-CON) 20 MEQ CR tablet Take 1 tablet by mouth 2 (Two) Times a Day. (Patient not taking: Reported on 2/25/2025) 60 tablet 5    rOPINIRole (REQUIP) 2 MG tablet Take 1 tablet by mouth Every 12 (Twelve) Hours. (Patient not taking: Reported on 2/25/2025) 60 tablet 3    Syringe 25G X 1\" 3 ML misc Use one syringe per B12 injection (Patient not taking: " "Reported on 2/25/2025) 10 each 0    valsartan (DIOVAN) 160 MG tablet Take 1 tablet by mouth Daily. (Patient not taking: Reported on 2/25/2025) 90 tablet 3     No current facility-administered medications on file prior to visit.        ALLERGIES:  No Known Allergies     Social History     Socioeconomic History    Marital status:    Tobacco Use    Smoking status: Never     Passive exposure: Never    Smokeless tobacco: Never   Vaping Use    Vaping status: Never Used   Substance and Sexual Activity    Alcohol use: Yes     Comment: once a month  one  Samantha/with Mexican Dinner    Drug use: Yes     Types: Marijuana     Comment: thc gummies for sleep    Sexual activity: Not Currently     Partners: Female     Birth control/protection: Vasectomy        Family History   Problem Relation Age of Onset    Malig Hyperthermia Neg Hx         Review of Systems   Constitutional:  Positive for unexpected weight change (Weight is actually increased from previous). Negative for activity change (Unchanged from his baseline) and fatigue (Unchanged from his baseline).   HENT: Negative.     Eyes: Negative.    Respiratory: Negative.     Cardiovascular: Negative.    Gastrointestinal:  Negative for abdominal pain (Improved, multiple small meals per day to prevent additional discomfort).   Genitourinary: Negative.    Musculoskeletal:  Positive for arthralgias (Unchanged from his baseline).   Skin: Negative.    Allergic/Immunologic: Negative.    Neurological:  Positive for numbness (Patient describes 30 years of peripheral neuropathy-severe-this has not worsen with chemotherapy and is currently stable.).   Psychiatric/Behavioral: Negative.     Current    Objective     Vitals:    02/25/25 1424   BP: (!) 189/111   Pulse: 70   Resp: 16   Temp: 98.2 °F (36.8 °C)   TempSrc: Oral   SpO2: 98%   Weight: 71.9 kg (158 lb 9.6 oz)   Height: 170 cm (66.93\")   PainSc: 0-No pain                   9/3/2024     2:34 PM   Current Status   ECOG score 0 "       Physical Exam  Vitals and nursing note reviewed.   Constitutional:       Appearance: Normal appearance. He is well-developed.   HENT:      Head: Normocephalic and atraumatic.      Nose: Nose normal.      Mouth/Throat:      Comments: Residual candidiasis primarily tongue based  Eyes:      Pupils: Pupils are equal, round, and reactive to light.   Cardiovascular:      Rate and Rhythm: Normal rate and regular rhythm.      Heart sounds: Normal heart sounds.   Pulmonary:      Effort: Pulmonary effort is normal. No respiratory distress.      Breath sounds: Normal breath sounds. No wheezing, rhonchi or rales.   Abdominal:      General: Bowel sounds are normal. There is no distension.      Palpations: Abdomen is soft.      Tenderness: There is no abdominal tenderness.   Musculoskeletal:         General: Normal range of motion.      Cervical back: Normal range of motion and neck supple.   Skin:     General: Skin is warm and dry.   Neurological:      Mental Status: He is alert and oriented to person, place, and time.   Psychiatric:         Behavior: Behavior normal.           RECENT LABS:  Hematology WBC   Date Value Ref Range Status   02/13/2025 6.39 3.40 - 10.80 10*3/mm3 Final   05/26/2021 7.73 4.5 - 11.0 10*3/uL Final     RBC   Date Value Ref Range Status   02/13/2025 4.68 4.14 - 5.80 10*6/mm3 Final   05/26/2021 4.94 4.5 - 5.9 10*6/uL Final     Hemoglobin   Date Value Ref Range Status   02/13/2025 14.1 13.0 - 17.7 g/dL Final   03/09/2023 14.4 13.7 - 17.5 Gram/dL Final   05/26/2021 15.9 13.5 - 17.5 g/dL Final     Hematocrit   Date Value Ref Range Status   02/13/2025 41.1 37.5 - 51.0 % Final   03/09/2023 41.2 40.1 - 51.0 % Final     Platelets   Date Value Ref Range Status   02/13/2025 165 140 - 450 10*3/mm3 Final   05/26/2021 208 140 - 440 10*3/uL Final          Assessment & Plan     *Gastric Adenocarcinoma  74-year-old male followed by primary care with hypertension, hyperlipidemia, type 2 diabetes, chronic venous  stasis osteoarthritis and gout.  He had developed polyarticular joint pain in December and required hospitalization with diet alteration and IV diuretics.  He did improve fortunately but began to have abdominal discomfort thereafter leading to assessment of gallbladder, renal ultrasound with a potential cortical defect left kidney and a follow-up CT scan of abdomen pelvis that revealed hepatic steatosis and colonic diverticulosis.  The patient was seen by GI for screening colonoscopy and surveillance of adenomatous colonic polyps.  At this point he is having upper abdominal symptoms thought to be gastritis though EGD was performed 4/27/2023 ultimately revealing an ulceration found on the posterior wall of the gastric body.  Biopsy was positive for poorly differentiated adenocarcinoma arising in a background of intestinal metaplasia and high-grade dysplasia, immunostain HER2 negative.  The patient's subsequent CT scan of chest and pelvis showed circumferential thickening of the lesser curvature of the stomach with a central area of ulceration and prominent gastrohepatic ligament lymphadenopathy concerning metastatic disease but no clear evidence of distant metastatic disease.  The patient is contacted by telephone 5/18/2023 and we plan to proceed with PET/CT next available, surgical assessment and follow-up in in CBC office subsequently.  The patient was more appropriately directed to general surgery initially and seen 5/26/2023 with consideration that he undergo peritoneal washing to evaluate for malignant cells.  This occurred through Dr. Mckenzie 5/30/2023-EGD and laparoscopy.  Study revealed through a retroflexed view the fundus and ulcerated tumor located along the lesser curvature immediately distal to the GE junction, 4 cm distal to the GE junction.  Washings were negative for definitive for malignancy.  It was concluded that he would need an esophagogastrectomy and the case was discussed with  who  favored upfront chemotherapy given neoadjuvantly.  The patient was seen 6/5/2023 and it was determined that endoscopic ultrasound be obtained and if this revealed T1 tumor and he be a good candidate for initial surgery.  The patient is to be seen by Dr. Hodge concerning this.  The patient is seen on 6/13/2023 in office.  We have discussed, in detail with his son present, his current status including the need for EUS, subsequent port placement as we determine his tumor stage.  Available, currently, his PET/CT performed 5/3/2023 showing low-level activity in the short segment of the lesser curvature at the site of his gastric malignancy, otherwise negative with very low-level metabolic activity in the abdomen and shotty nodes in the axilla and both groin regions.  The patient was now scheduled for EUS next week with results pending.  Pending the T stage he could well be a candidate for neoadjuvant chemotherapy but we find now that his peripheral neuropathy is both severe and longstanding and thus the use of certain chemotherapy regimens such as FLOT (frequently used) is of concern since peripheral neuropathy risk could be quite high.  Alternative FOLFOX could be utilized with oxaliplatin dose adjusted pending his degree of neuropathic symptoms.  He returned to laboratory undergoing exams including normal B12, ferritin, iron profile 10% saturation, CEA 4.56, hemoglobin A1c of 6.50.  Additionally underwent teaching for FOLFOX chemotherapy.  Dr. Varela has contacted me indicating that EUS is scheduled 6/21/2023 and he will schedule PowerPort 6/23/2023.  The patient proceeded to PET/CT performed 5/3/2023 showing low-level activity in the short segment of the lesser curvature at the site of his gastric malignancy, otherwise negative with very low-level metabolic activity in the abdomen and shotty nodes in the axilla and both groin regions.  The patient continued his staging undergoing EGD and EUS 6/21/2023 demonstrated  gastric ulceration/mass in the mid to proximal portion lesser curvature of the stomach measuring 5-7 cm, EUS with gastric mass and invasion of muscularis propria with 1 area penetrating through the muscularis propria into the adventitia-likely T2 lesion, 3 small lymph nodes celiac axis/gastrohepatic region not overtly hyperechoic largest measuring 6 mm, normal pancreatic EUS and fatty liver with no metastatic lesions.  FNB x2 performed the largest lymph node negative cytologically.  He had seen  6/21/2023 with plans to approach a T2 lesion or higher with chemotherapy neoadjuvant adjuvant settings-likely to require total gastrectomy, partial Jasiel Arnulfo esophagectomy and Steffany-en-Y reconstruction.  Seen back in office 6/27/2023 to proceed with chemotherapy-FOLFOX.  We discussed that he would be offered 4 cycles preoperative and 4 cycles postoperative pending tolerance.  His findings and course will be discussed with his surgeons as we proceed.  Patient reviewed back today following first cycle of FOLFOX.  He reports he is feeling well and has remained asymptomatic.  He has not noted any increased neuropathy from his baseline.  He denies any nausea, vomiting, increased arthralgias, cold sensation, or diarrhea.  He did have some mild constipation initially that resolved on its own.  He has remained active and continues to eat and drink well.  He does not feel that he needs any IV fluids today.  The patient is seen 7/11/2023 for his second cycle of FOLFOX chemotherapy.  7/25/2023 cycle 3 neoadjuvant FOLFOX (out of 4 preoperative cycles planned).  Tolerating well.  We went on to proceed with cycle 3 FOLFOX and plans for the patient to be seen 2 weeks later.  In the meantime he has been seen by Dr. Varela anticipating surgical resection to require total gastrectomy, partial [place] Arnulfo esophagectomy and Steffany-en-Y reconstruction.  Currently the patient is scheduled for a PET 8/14/2023 and review by Dr. Varela 8/17/2023.  The  patient is next seen 8/9/2023.  He is ready to proceed with his fourth cycle of therapy and subsequent PET/CT.  His case has been discussed with  as well as to his plans described above.  The patient proceeded to PET/CT 8/14/2023 to document resolution of focal hypermetabolism along the gastric lesser curvature, subcentimeter upper abdominal retroperitoneal lymphadenopathy was too small to characterize as were unchanged 5 to 6 mm right lower lobe solid pulmonary nodules.  The patient was admitted 9/13-9/18 undergoing Noble total gastrectomy with Steffany-en-Y, esophagojejunostomy and jejunal feeding tube placement.  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  Final stage: [id number].  The patient was seen back by Dr. Mckenzie 9/28/2023 with his incision healing and staples removed, J-tube had not been used and was removed patient was doing well enough to be referred back to proceed with his next portion of therapy-4 cycles of FOLFOX.  Patient seen 10/17/2023.  He is seen formally 10/16/2023.  Fortunately he is doing exceedingly well and, while he is working to manage his new diet, is confirmed with Dr. Mckenzie that we can proceed with her second phase of chemotherapy with 4 additional cycles of FOLFOX.  10/24/2023: Cycle 5 FOLFOX.  Was previously struggling with constipation, for the last week has had diarrhea up to 8 episodes daily.  Has not taken any antidiarrheals, reports he was unsure if he could take these following  surgery.  Weight is down approximately 10 pounds since his last visit.  Admits he is not eating much, as he typically has diarrhea after eating.  Also admits he is not drinking a lot of fluid, about 20 ounces daily.  He is scheduled to see [place] dietitian following our visit today who plans to review dietary recommendations.  Recommended he start Imodium as needed for diarrhea.  If no improvement with Imodium plan to start Lomotil.  We will proceed with 500 cc normal saline prior to chemotherapy.   Magnesium checked today and normal at 1.9.  10/31/2023: Diarrhea is significantly improved with Imodium use.  Typically taking 1 Imodium tablet every 3-4 days.  Feels his fluid intake has been doing good.  Proceed with 500 cc normal saline today while we await CMP results.  Creatinine normal, patient will not receive any further fluids.  11/7/2023: cycle 6 FOLFOX today.  Tolerating well.  11/15/2023: Seen for triage due to concerns for dehydration.  Creatinine 0.83, BUN 17.  Received 500 cc normal saline.  11/21/2023 C7 FOLFOX.     11/28/2023: 1 L IV normal saline today.  Patient feels improvement overall, especially related to fatigue after receiving fluids and would like to continue to do so today.    He is emotional today, feeling frustrated in regards to his eating restrictions.  This has been more difficult during the holidays.  We discussed getting set up with our supportive oncology team, and he is agreeable.  Referral will be placed today.  Patient is/12/5/2023 for fourth cycle of FOLFOX, improved performance status and mood.  Agrees to proceed with his final course of adjuvant therapy and subsequent repeat scans in 1 week.  Unfortunately the patient had a reaction during his administration of his last chemotherapy regimen leading to discontinuance of the treatment.  This led to gradual improvement of his symptoms with nausea completely resolving.  At this point we went on to have him complete the 5-FU portion of  his treatment and he underwent repeat scans In and accelerated fashion with CT of chest, and, pelvis 12/8/2023 that is reviewed with him 12/11/2023 demonstrating small residual fluid within the abdomen and pelvis, small nodes into the distal esophagus slightly larger and thought to be reactive.  No additional abnormalities are present.  We have discussed maintaining his port with every 4 week flushes and have not seen back in 8 weeks and possibly rescan in approximately 3 months.  2/6/2024: Complains of loss of taste that occurred 3 weeks ago.  Has been difficulty eating adequately because of this, however does force himself to eat.  On exam, he has thrush present so we will start him on nystatin.  Altered taste could also be related to chemotherapy, however did advise him it could improve after treating his thrush.  Otherwise he is feeling well without any new concerns.  Scans in 4 weeks with MD follow up to review scans in 5 weeks.  Follow-up CT scan 3/13/2024 with further reduced paraesophageal aortocaval lymph nodes, no suggestion of recurrent disease.  Plans made for follow-up scans in 6 months with port flush every 6 weeks.  He is seen 9/3/2024 slowly having gained weight by scheduling of his meals recognizing cannot eat like he did previously and with a reasonably good performance status.  We discussed the above scans indicating on a follow-up exam be necessary.  He also describes the fact that he will often use a massager on his chest to help his port flow more easily.  While this is not a commonly used practice we will research whether there is any utility the with the concern that a fibrin sheath or possible catheter fracture would be a risk.  Plan to reassess by repeat scans in 20 weeks  Patient is next seen 2/25/2025 with CT of chest, abdomen, pelvis 2/13/2025 showing  a few sub-6 mm pulmonary nodules, right external iliac node both of which indeterminate at this point.  He indicates that he has been  able to maintain his weight but is concerned that he is not often hungry and has extremely early satiety.  We have discussed an assessment by GI likely with repeat endoscopy.      *Venous access  patient underwent Mediport placement 6/23/2023   Patient's port evaluated 7/10/2023 having been accessed for week after last visit.  Fortunately the area in question is not significantly inflamed nor fluctuant.  We have deaccessed, cleaned the site, reaccessed and plan to proceed with blood cultures pending.  Size reevaluated subsequent without additional inflammation  Given the the patient's Mediport still works properly.  He is advocating the use of LAMA massager-hand-held unit to facilitate flow.    *Hypertension  Patient admits he has been noncompliant with his medications, does not take them regularly.    Blood pressure 10/26/2023 185/102.  He is fortunately asymptomatic.  Strongly encouraged him to resume blood pressure medications and close monitoring of blood pressure at home, reports he is agreeable with this.   10/31/2023: Blood pressure 176/102.  Has been compliant with his blood pressure medication over the last week.  Asymptomatic.  Checking his blood pressure at home and reports it typically runs 150s over 90s.  He plans to follow-up with his PCP in regards to blood pressure management.  Did advise if he becomes symptomatic he should present to the ER.  11/7/2023: BP today 167/91.  Recently started on clonidine patch.  Having some neck stiffness from the clonidine patch.  Follows again with his PCP Thursday for further management.  11/15/2023: Pressure remains elevated 160/88.  Denies any chest pain or shortness of breath.  500 mils of normal saline given today for hydration while CMP pending.  11/21/2023: /98.  Asymptomatic.  Continues with clonidine patch.  11/28/2023: 157/91.,  Continues treatment evaluated 12/5/2023 2/6/2024: 174/103.  Asymptomatic.  Has not been compliant with his blood pressure  medication, strongly encouraged him to take blood pressure medication regularly and monitor his BP at home.    *Hypokalemia  Potassium 2.9.  Patient again admits he has not been taking his oral potassium regularly.  Encouraged him to continue taking 20 mEq twice daily.  Also encouraged increasing potassium in his diet.  11/15/2023: Potassium is 3.4 today.  He has potassium chloride 20 mill equivalents he has been asked to take twice daily and encouraged to increase his potassium intake in his diet.  Does admit he has not been taking this consistently but does plan to do so.  We will continue to monitor.   11/21/2023: Potassium today still low at 3.3.  He again admits he is not taking his potassium replacement.  He will be provided with a handout on high potassium foods, encouraged him to increase his dietary potassium if he will not take oral potassium supplement.   11/28/2023: Potassium 3.7.  Repeat 12/5/2023 3.1, diet adjusted, recheck 12/7/2023 at 3.3  2/6/2024: Potassium 3.9.  9/3/2024 potassium 3.3, diet adjusted    *Weight loss  11/15/2023: Patient continues to struggle with his appetite and has been losing weight.    Enterade provided to patient to start in hopes of reducing GI symptoms related to chemotherapy.  11/21/2023: Weight improved 174 pounds.  Diarrhea has improved with Enterade.  Continue follow up with oncology dietician.   2/6/2024: Reports difficulty with eating adequately due to taste changes that started abruptly 3 weeks ago.  He has thrush present on exam which will be treated with nystatin.  Weight 157 9/3/2024, repeat dietary discussion  Weight 158 2/25/2025    *Oropharyngeal candidiasis-present today on exam.  Patient has been complaining of decreased taste for the last 3 weeks.  Start nystatin 4 times daily x 7 days.  Improved though persistent 3/20/2024, trial of fluconazole x 7 days  Resolved 9/24    PLAN:   Continue follow-up with oncology dietitian.  Continue Imodium as needed for  diarrhea.  Requested follow-up with Dr. Reilly of GI  Continue current hypertensive regimen and follow-up with PCP.  Every 6 week port flush, scan order placed today-23 weeks  24 weeks follow-up MD, CBC, CMP  Mikel Ludwig MD  02/25/25

## 2025-02-25 ENCOUNTER — OFFICE VISIT (OUTPATIENT)
Dept: ONCOLOGY | Facility: CLINIC | Age: 75
End: 2025-02-25
Payer: MEDICARE

## 2025-02-25 VITALS
RESPIRATION RATE: 16 BRPM | HEART RATE: 70 BPM | TEMPERATURE: 98.2 F | HEIGHT: 67 IN | DIASTOLIC BLOOD PRESSURE: 111 MMHG | SYSTOLIC BLOOD PRESSURE: 189 MMHG | OXYGEN SATURATION: 98 % | BODY MASS INDEX: 24.89 KG/M2 | WEIGHT: 158.6 LBS

## 2025-02-25 DIAGNOSIS — C16.9 GASTRIC ADENOCARCINOMA: ICD-10-CM

## 2025-02-25 DIAGNOSIS — C16.0 MALIGNANT NEOPLASM OF CARDIA OF STOMACH: Primary | ICD-10-CM

## 2025-02-25 PROCEDURE — 1126F AMNT PAIN NOTED NONE PRSNT: CPT | Performed by: INTERNAL MEDICINE

## 2025-02-25 PROCEDURE — 99214 OFFICE O/P EST MOD 30 MIN: CPT | Performed by: INTERNAL MEDICINE

## 2025-04-08 ENCOUNTER — INFUSION (OUTPATIENT)
Dept: ONCOLOGY | Facility: HOSPITAL | Age: 75
End: 2025-04-08
Payer: MEDICARE

## 2025-04-08 DIAGNOSIS — Z45.2 FITTING AND ADJUSTMENT OF VASCULAR CATHETER: Primary | ICD-10-CM

## 2025-04-08 PROCEDURE — 96523 IRRIG DRUG DELIVERY DEVICE: CPT

## 2025-04-08 PROCEDURE — 25010000002 HEPARIN LOCK FLUSH PER 10 UNITS: Performed by: INTERNAL MEDICINE

## 2025-04-08 RX ORDER — SODIUM CHLORIDE 0.9 % (FLUSH) 0.9 %
10 SYRINGE (ML) INJECTION AS NEEDED
Status: DISCONTINUED | OUTPATIENT
Start: 2025-04-08 | End: 2025-04-08 | Stop reason: HOSPADM

## 2025-04-08 RX ORDER — HEPARIN SODIUM (PORCINE) LOCK FLUSH IV SOLN 100 UNIT/ML 100 UNIT/ML
500 SOLUTION INTRAVENOUS AS NEEDED
Status: DISCONTINUED | OUTPATIENT
Start: 2025-04-08 | End: 2025-04-08 | Stop reason: HOSPADM

## 2025-04-08 RX ORDER — HEPARIN SODIUM (PORCINE) LOCK FLUSH IV SOLN 100 UNIT/ML 100 UNIT/ML
500 SOLUTION INTRAVENOUS AS NEEDED
OUTPATIENT
Start: 2025-04-08

## 2025-04-08 RX ORDER — SODIUM CHLORIDE 0.9 % (FLUSH) 0.9 %
10 SYRINGE (ML) INJECTION AS NEEDED
OUTPATIENT
Start: 2025-04-08

## 2025-04-08 RX ADMIN — Medication 10 ML: at 10:29

## 2025-04-08 RX ADMIN — Medication 500 UNITS: at 10:29

## 2025-05-20 ENCOUNTER — INFUSION (OUTPATIENT)
Dept: ONCOLOGY | Facility: HOSPITAL | Age: 75
End: 2025-05-20
Payer: MEDICARE

## 2025-05-20 DIAGNOSIS — Z45.2 FITTING AND ADJUSTMENT OF VASCULAR CATHETER: Primary | ICD-10-CM

## 2025-05-20 PROCEDURE — 96523 IRRIG DRUG DELIVERY DEVICE: CPT

## 2025-05-20 PROCEDURE — 25010000002 HEPARIN LOCK FLUSH PER 10 UNITS: Performed by: INTERNAL MEDICINE

## 2025-05-20 RX ORDER — HEPARIN SODIUM (PORCINE) LOCK FLUSH IV SOLN 100 UNIT/ML 100 UNIT/ML
500 SOLUTION INTRAVENOUS AS NEEDED
OUTPATIENT
Start: 2025-05-20

## 2025-05-20 RX ORDER — HEPARIN SODIUM (PORCINE) LOCK FLUSH IV SOLN 100 UNIT/ML 100 UNIT/ML
500 SOLUTION INTRAVENOUS AS NEEDED
Status: DISCONTINUED | OUTPATIENT
Start: 2025-05-20 | End: 2025-05-20 | Stop reason: HOSPADM

## 2025-05-20 RX ORDER — SODIUM CHLORIDE 0.9 % (FLUSH) 0.9 %
10 SYRINGE (ML) INJECTION AS NEEDED
OUTPATIENT
Start: 2025-05-20

## 2025-05-20 RX ORDER — SODIUM CHLORIDE 0.9 % (FLUSH) 0.9 %
10 SYRINGE (ML) INJECTION AS NEEDED
Status: DISCONTINUED | OUTPATIENT
Start: 2025-05-20 | End: 2025-05-20 | Stop reason: HOSPADM

## 2025-05-20 RX ADMIN — Medication 10 ML: at 10:05

## 2025-05-20 RX ADMIN — Medication 500 UNITS: at 10:05

## 2025-06-19 RX ORDER — DULOXETIN HYDROCHLORIDE 20 MG/1
20 CAPSULE, DELAYED RELEASE ORAL DAILY
Qty: 60 CAPSULE | Refills: 0 | Status: SHIPPED | OUTPATIENT
Start: 2025-06-19

## 2025-07-01 ENCOUNTER — INFUSION (OUTPATIENT)
Dept: ONCOLOGY | Facility: HOSPITAL | Age: 75
End: 2025-07-01
Payer: MEDICARE

## 2025-07-01 DIAGNOSIS — Z45.2 FITTING AND ADJUSTMENT OF VASCULAR CATHETER: Primary | ICD-10-CM

## 2025-07-01 PROCEDURE — 96523 IRRIG DRUG DELIVERY DEVICE: CPT

## 2025-07-01 PROCEDURE — 25010000002 HEPARIN LOCK FLUSH PER 10 UNITS: Performed by: INTERNAL MEDICINE

## 2025-07-01 RX ORDER — SODIUM CHLORIDE 0.9 % (FLUSH) 0.9 %
10 SYRINGE (ML) INJECTION AS NEEDED
OUTPATIENT
Start: 2025-07-01

## 2025-07-01 RX ORDER — HEPARIN SODIUM (PORCINE) LOCK FLUSH IV SOLN 100 UNIT/ML 100 UNIT/ML
500 SOLUTION INTRAVENOUS AS NEEDED
Status: DISCONTINUED | OUTPATIENT
Start: 2025-07-01 | End: 2025-07-01 | Stop reason: HOSPADM

## 2025-07-01 RX ORDER — SODIUM CHLORIDE 0.9 % (FLUSH) 0.9 %
10 SYRINGE (ML) INJECTION AS NEEDED
Status: DISCONTINUED | OUTPATIENT
Start: 2025-07-01 | End: 2025-07-01 | Stop reason: HOSPADM

## 2025-07-01 RX ORDER — HEPARIN SODIUM (PORCINE) LOCK FLUSH IV SOLN 100 UNIT/ML 100 UNIT/ML
500 SOLUTION INTRAVENOUS AS NEEDED
OUTPATIENT
Start: 2025-07-01

## 2025-07-01 RX ADMIN — Medication 10 ML: at 10:24

## 2025-07-01 RX ADMIN — Medication 500 UNITS: at 10:24

## 2025-08-05 ENCOUNTER — INFUSION (OUTPATIENT)
Dept: ONCOLOGY | Facility: HOSPITAL | Age: 75
End: 2025-08-05
Payer: MEDICARE

## 2025-08-05 ENCOUNTER — HOSPITAL ENCOUNTER (OUTPATIENT)
Dept: CT IMAGING | Facility: HOSPITAL | Age: 75
Discharge: HOME | End: 2025-08-05
Admitting: INTERNAL MEDICINE
Payer: MEDICARE

## 2025-08-05 DIAGNOSIS — C16.0 MALIGNANT NEOPLASM OF CARDIA OF STOMACH: ICD-10-CM

## 2025-08-05 DIAGNOSIS — C16.9 GASTRIC ADENOCARCINOMA: ICD-10-CM

## 2025-08-05 LAB
ALBUMIN SERPL-MCNC: 4 G/DL (ref 3.5–5.2)
ALBUMIN/GLOB SERPL: 1.8 G/DL
ALP SERPL-CCNC: 91 U/L (ref 39–117)
ALT SERPL W P-5'-P-CCNC: 25 U/L (ref 1–41)
ANION GAP SERPL CALCULATED.3IONS-SCNC: 9.1 MMOL/L (ref 5–15)
AST SERPL-CCNC: 25 U/L (ref 1–40)
BASOPHILS # BLD AUTO: 0.03 10*3/MM3 (ref 0–0.2)
BASOPHILS NFR BLD AUTO: 0.5 % (ref 0–1.5)
BILIRUB SERPL-MCNC: 0.6 MG/DL (ref 0–1.2)
BUN SERPL-MCNC: 15.3 MG/DL (ref 8–23)
BUN/CREAT SERPL: 19.1 (ref 7–25)
CALCIUM SPEC-SCNC: 8.9 MG/DL (ref 8.6–10.5)
CHLORIDE SERPL-SCNC: 110 MMOL/L (ref 98–107)
CO2 SERPL-SCNC: 26.9 MMOL/L (ref 22–29)
CREAT SERPL-MCNC: 0.8 MG/DL (ref 0.76–1.27)
DEPRECATED RDW RBC AUTO: 44.8 FL (ref 37–54)
EGFRCR SERPLBLD CKD-EPI 2021: 93.4 ML/MIN/1.73
EOSINOPHIL # BLD AUTO: 0.25 10*3/MM3 (ref 0–0.4)
EOSINOPHIL NFR BLD AUTO: 3.9 % (ref 0.3–6.2)
ERYTHROCYTE [DISTWIDTH] IN BLOOD BY AUTOMATED COUNT: 13.2 % (ref 12.3–15.4)
GLOBULIN UR ELPH-MCNC: 2.2 GM/DL
GLUCOSE SERPL-MCNC: 95 MG/DL (ref 65–99)
HCT VFR BLD AUTO: 37.5 % (ref 37.5–51)
HGB BLD-MCNC: 12.8 G/DL (ref 13–17.7)
IMM GRANULOCYTES # BLD AUTO: 0.02 10*3/MM3 (ref 0–0.05)
IMM GRANULOCYTES NFR BLD AUTO: 0.3 % (ref 0–0.5)
LYMPHOCYTES # BLD AUTO: 2.2 10*3/MM3 (ref 0.7–3.1)
LYMPHOCYTES NFR BLD AUTO: 34.8 % (ref 19.6–45.3)
MCH RBC QN AUTO: 31.4 PG (ref 26.6–33)
MCHC RBC AUTO-ENTMCNC: 34.1 G/DL (ref 31.5–35.7)
MCV RBC AUTO: 91.9 FL (ref 79–97)
MONOCYTES # BLD AUTO: 0.46 10*3/MM3 (ref 0.1–0.9)
MONOCYTES NFR BLD AUTO: 7.3 % (ref 5–12)
NEUTROPHILS NFR BLD AUTO: 3.37 10*3/MM3 (ref 1.7–7)
NEUTROPHILS NFR BLD AUTO: 53.2 % (ref 42.7–76)
NRBC BLD AUTO-RTO: 0 /100 WBC (ref 0–0.2)
PLATELET # BLD AUTO: 180 10*3/MM3 (ref 140–450)
PMV BLD AUTO: 10.2 FL (ref 6–12)
POTASSIUM SERPL-SCNC: 3.7 MMOL/L (ref 3.5–5.2)
PROT SERPL-MCNC: 6.2 G/DL (ref 6–8.5)
RBC # BLD AUTO: 4.08 10*6/MM3 (ref 4.14–5.8)
SODIUM SERPL-SCNC: 146 MMOL/L (ref 136–145)
WBC NRBC COR # BLD AUTO: 6.33 10*3/MM3 (ref 3.4–10.8)

## 2025-08-05 PROCEDURE — 25510000001 IOPAMIDOL 61 % SOLUTION: Performed by: INTERNAL MEDICINE

## 2025-08-05 PROCEDURE — 80053 COMPREHEN METABOLIC PANEL: CPT | Performed by: INTERNAL MEDICINE

## 2025-08-05 PROCEDURE — 25510000002 DIATRIZOATE MEGLUMINE & SODIUM PER 1 ML: Performed by: INTERNAL MEDICINE

## 2025-08-05 PROCEDURE — 85025 COMPLETE CBC W/AUTO DIFF WBC: CPT | Performed by: INTERNAL MEDICINE

## 2025-08-05 PROCEDURE — 74177 CT ABD & PELVIS W/CONTRAST: CPT

## 2025-08-05 PROCEDURE — 71260 CT THORAX DX C+: CPT

## 2025-08-05 RX ORDER — IOPAMIDOL 612 MG/ML
100 INJECTION, SOLUTION INTRAVASCULAR
Status: COMPLETED | OUTPATIENT
Start: 2025-08-05 | End: 2025-08-05

## 2025-08-05 RX ORDER — DIATRIZOATE MEGLUMINE AND DIATRIZOATE SODIUM 660; 100 MG/ML; MG/ML
30 SOLUTION ORAL; RECTAL
Status: COMPLETED | OUTPATIENT
Start: 2025-08-05 | End: 2025-08-05

## 2025-08-05 RX ADMIN — DIATRIZOATE MEGLUMINE AND DIATRIZOATE SODIUM 30 ML: 660; 100 LIQUID ORAL; RECTAL at 08:43

## 2025-08-05 RX ADMIN — IOPAMIDOL 95 ML: 612 INJECTION, SOLUTION INTRAVENOUS at 09:28

## 2025-08-12 ENCOUNTER — OFFICE VISIT (OUTPATIENT)
Dept: ONCOLOGY | Facility: CLINIC | Age: 75
End: 2025-08-12
Payer: MEDICARE

## 2025-08-12 VITALS
BODY MASS INDEX: 24.92 KG/M2 | HEART RATE: 69 BPM | DIASTOLIC BLOOD PRESSURE: 89 MMHG | TEMPERATURE: 97.4 F | SYSTOLIC BLOOD PRESSURE: 140 MMHG | OXYGEN SATURATION: 97 % | WEIGHT: 158.8 LBS | HEIGHT: 67 IN

## 2025-08-12 DIAGNOSIS — C16.0 MALIGNANT NEOPLASM OF CARDIA OF STOMACH: ICD-10-CM

## 2025-08-12 PROCEDURE — 99214 OFFICE O/P EST MOD 30 MIN: CPT | Performed by: INTERNAL MEDICINE

## 2025-08-12 PROCEDURE — 1126F AMNT PAIN NOTED NONE PRSNT: CPT | Performed by: INTERNAL MEDICINE

## 2025-08-18 RX ORDER — DULOXETIN HYDROCHLORIDE 20 MG/1
20 CAPSULE, DELAYED RELEASE ORAL DAILY
Qty: 60 CAPSULE | Refills: 0 | Status: SHIPPED | OUTPATIENT
Start: 2025-08-18

## (undated) DEVICE — HORIZON TI ML 6 CLIPS/CART
Type: IMPLANTABLE DEVICE | Site: ABDOMEN | Status: NON-FUNCTIONAL
Brand: WECK

## (undated) DEVICE — TOWEL,OR,DSP,ST,BLUE,STD,4/PK,20PK/CS: Brand: MEDLINE

## (undated) DEVICE — GLV SURG BIOGEL LTX PF 6

## (undated) DEVICE — GOWN ,SIRUS,NONREINFORCED SMALL: Brand: MEDLINE

## (undated) DEVICE — WOUND RETRACTOR AND PROTECTOR: Brand: ALEXIS WOUND PROTECTOR-RETRACTOR

## (undated) DEVICE — CONTAINER,SPECIMEN,OR STERILE,4OZ: Brand: MEDLINE

## (undated) DEVICE — GLV SURG BIOGEL LTX PF 8

## (undated) DEVICE — HAND PACK: Brand: MEDLINE INDUSTRIES, INC.

## (undated) DEVICE — PATIENT RETURN ELECTRODE, SINGLE-USE, CONTACT QUALITY MONITORING, ADULT, WITH 9FT CORD, FOR PATIENTS WEIGING OVER 33LBS. (15KG): Brand: MEGADYNE

## (undated) DEVICE — SUT PDS 4-0 RB-1 Z304H

## (undated) DEVICE — JACKSON-PRATT 100CC BULB RESERVOIR: Brand: CARDINAL HEALTH

## (undated) DEVICE — SPLNT PLSTR CAST XFAST 3IN

## (undated) DEVICE — SUT ETHLN 3/0 PS1 18IN 1663H

## (undated) DEVICE — ENDOPATH PNEUMONEEDLE INSUFFLATION NEEDLES WITH LUER LOCK CONNECTORS 120MM: Brand: ENDOPATH

## (undated) DEVICE — SYR LUERLOK 5CC

## (undated) DEVICE — STPLR SKIN VISISTAT WD 35CT

## (undated) DEVICE — PENCL ES MEGADINE EZ/CLEAN BUTN W/HOLSTR 10FT

## (undated) DEVICE — PENROSE DRAIN 18" X 5/8": Brand: CARDINAL HEALTH

## (undated) DEVICE — ANTIBACTERIAL UNDYED BRAIDED (POLYGLACTIN 910), SYNTHETIC ABSORBABLE SUTURE: Brand: COATED VICRYL

## (undated) DEVICE — ELECTRD BLD EZ CLN MOD XLNG 2.75IN

## (undated) DEVICE — PREMIUM DRY TRAY LF: Brand: MEDLINE INDUSTRIES, INC.

## (undated) DEVICE — PAD UNDERCAST WYTEX 2IN 4YD LF STRL

## (undated) DEVICE — STCKNT RL COTN BIAS/CT 4IN 50YD NS

## (undated) DEVICE — DISPOSABLE TOURNIQUET CUFF SINGLE BLADDER, SINGLE PORT AND QUICK CONNECT CONNECTOR: Brand: COLOR CUFF

## (undated) DEVICE — DISPOSABLE BIPOLAR FORCEPS 4" (10.2CM) JEWELERS, STRAIGHT 0.4MM TIP AND 12 FT. (3.6M) CABLE: Brand: KIRWAN

## (undated) DEVICE — 1010 S-DRAPE TOWEL DRAPE 10/BX: Brand: STERI-DRAPE™

## (undated) DEVICE — SKIN PREP TRAY W/CHG: Brand: MEDLINE INDUSTRIES, INC.

## (undated) DEVICE — SUT PDS 0 CT1 36IN Z346H

## (undated) DEVICE — SOL ANTISEP SCRB HIBICLENS CHG4PCT 4OZ

## (undated) DEVICE — GLV SURG BIOGEL LTX PF 7

## (undated) DEVICE — TUBING, SUCTION, 1/4" X 20', STRAIGHT: Brand: MEDLINE INDUSTRIES, INC.

## (undated) DEVICE — SUT SILK 3/0 TIES 18IN A184H

## (undated) DEVICE — PK PROC MAJ 40

## (undated) DEVICE — SUT VIC 0 TN 27IN DYED JTN0G

## (undated) DEVICE — 1527-0 TRANSPORE TAPE         1/2INX10YD 24/BX 10BX/CS: Brand: 3M™ TRANSPORE™

## (undated) DEVICE — ADAPT CLN BIOGUARD AIR/H2O DISP

## (undated) DEVICE — Device

## (undated) DEVICE — MEDI-VAC YANKAUER SUCTION HANDLE W/BULBOUS TIP: Brand: CARDINAL HEALTH

## (undated) DEVICE — TRAP FLD MINIVAC MEGADYNE 100ML

## (undated) DEVICE — COVER,MAYO STAND,STERILE: Brand: MEDLINE

## (undated) DEVICE — SPNG GZ WOVN 4X4IN 12PLY 10/BX STRL

## (undated) DEVICE — CANN O2 ETCO2 FITS ALL CONN CO2 SMPL A/ 7IN DISP LF

## (undated) DEVICE — SUT SILK 2/0 SH CR5 18IN C0125

## (undated) DEVICE — LIGACLIP MCA MULTIPLE CLIP APPLIERS, 20 MEDIUM CLIPS
Type: IMPLANTABLE DEVICE | Site: ABDOMEN | Status: NON-FUNCTIONAL
Brand: LIGACLIP

## (undated) DEVICE — IRRIGATOR BULB ASEPTO 60CC STRL

## (undated) DEVICE — TBG PENCL TELESCP MEGADYNE SMOKE EVAC 10FT

## (undated) DEVICE — ECHELON FLEX 60 ARTICULATING ENDOSCOPIC LINEAR CUTTER (NO CARTRIDGE): Brand: ECHELON FLEX ENDOPATH

## (undated) DEVICE — ADHS SKIN SURG TISS VISC PREMIERPRO EXOFIN HI/VISC FAST/DRY

## (undated) DEVICE — ENDOPATH XCEL UNIVERSAL TROCAR STABLILITY SLEEVES: Brand: ENDOPATH XCEL

## (undated) DEVICE — SPONGE,DISSECTOR,K,XRAY,9/16"X1/4",STRL: Brand: MEDLINE

## (undated) DEVICE — SUT SILK 3/0 SUTUPAK TIES 24IN SA74H

## (undated) DEVICE — SUT SILK 2/0 TIES 18IN A185H

## (undated) DEVICE — OCCLUSIVE GAUZE STRIP OVERWRAP,3% BISMUTH TRIBROMOPHENATE IN PETROLATUM BLEND: Brand: XEROFORM

## (undated) DEVICE — GAUZE,SPONGE,4"X4",16PLY,STRL,LF,10/TRAY: Brand: MEDLINE

## (undated) DEVICE — LN SMPL CO2 SHTRM SD STREAM W/M LUER

## (undated) DEVICE — SPNG LAP 18X18IN LF STRL PK/5

## (undated) DEVICE — SUT SILK 2/0 SH CR8 18IN CR8 C012D

## (undated) DEVICE — SUT SILK 3/0 SH CR5 18IN C0135

## (undated) DEVICE — MARYLAND JAW LAPAROSCOPIC SEALER/DIVIDER COATED: Brand: LIGASURE

## (undated) DEVICE — DISPOSABLE GRASPER CARTRIDGE: Brand: DIRECT DRIVE REPOSABLE GRASPERS

## (undated) DEVICE — PENROSE DRAIN, 36 X 3 4: Brand: CARDINAL HEALTH

## (undated) DEVICE — ENDOPATH XCEL BLADELESS TROCARS WITH STABILITY SLEEVES: Brand: ENDOPATH XCEL

## (undated) DEVICE — ABSORBABLE HEMOSTAT (OXIDIZED REGENERATED CELLULOSE, U.S.P.)
Type: IMPLANTABLE DEVICE | Site: ABDOMEN | Status: NON-FUNCTIONAL
Brand: SURGICEL

## (undated) DEVICE — SUT PROLN 4/0 PS2 18IN BLU

## (undated) DEVICE — NDL HYPO PRECISIONGLIDE REG 25G 1 1/2

## (undated) DEVICE — SUT SILK 3/0 RB1 CR8 18IN C053D

## (undated) DEVICE — SUT MNCRYL PLS ANTIB UD 4/0 PS2 18IN

## (undated) DEVICE — GLV SURG PREMIERPRO ORTHO LTX PF SZ7.5 BRN

## (undated) DEVICE — LOU THORACIC: Brand: MEDLINE INDUSTRIES, INC.

## (undated) DEVICE — ELECTRD BLD EZ CLN MOD 6.5IN

## (undated) DEVICE — APPL CHLORAPREP HI/LITE 26ML ORNG

## (undated) DEVICE — LAPAROSCOPIC SMOKE ELIMINATION DEVICE: Brand: PNEUVIEW XE

## (undated) DEVICE — SUT VIC 5/0 PS2 18IN J495H

## (undated) DEVICE — SOL NACL 0.9PCT 1000ML

## (undated) DEVICE — UNIVERSAL PACK: Brand: CARDINAL HEALTH

## (undated) DEVICE — THE STERILE LIGHT HANDLE COVER IS USED WITH STERIS SURGICAL LIGHTING AND VISUALIZATION SYSTEMS.

## (undated) DEVICE — DEV COND GAS LAP INSUFLOW W/LUER CONN

## (undated) DEVICE — SENSR O2 OXIMAX FNGR A/ 18IN NONSTR

## (undated) DEVICE — BITEBLOCK OMNI BLOC

## (undated) DEVICE — TOTAL TRAY, 16FR 10ML SIL FOLEY, URN: Brand: MEDLINE

## (undated) DEVICE — SUT SILK 0 PSL 18IN 580H

## (undated) DEVICE — ENDOCUT SCISSOR TIP, DISPOSABLE: Brand: RENEW

## (undated) DEVICE — LOU LAP CHOLE: Brand: MEDLINE INDUSTRIES, INC.

## (undated) DEVICE — SUT SILK 2/0 SH 30IN K833H

## (undated) DEVICE — ADAPTER,CATHETER/SYRINGE/LUER,STERILE: Brand: MEDLINE

## (undated) DEVICE — KT ORCA ORCAPOD DISP STRL

## (undated) DEVICE — CATHETER,URETHRAL,REDRUBBER,STRL,14FR: Brand: MEDLINE INDUSTRIES, INC.

## (undated) DEVICE — SUT SILK 0 TIES 18IN SA66G

## (undated) DEVICE — TUBING, SUCTION, 1/4" X 10', STRAIGHT: Brand: MEDLINE

## (undated) DEVICE — COUNT NDL MAG FM/BLCK 40COUNT

## (undated) DEVICE — DISPOSABLE MONOPOLAR ENDOSCOPIC CORD 10 FT. (3M): Brand: KIRWAN

## (undated) DEVICE — 3M™ IOBAN™ 2 ANTIMICROBIAL INCISE DRAPE 6651EZ: Brand: IOBAN™ 2